# Patient Record
Sex: MALE | Race: WHITE | NOT HISPANIC OR LATINO | Employment: OTHER | URBAN - NONMETROPOLITAN AREA
[De-identification: names, ages, dates, MRNs, and addresses within clinical notes are randomized per-mention and may not be internally consistent; named-entity substitution may affect disease eponyms.]

---

## 2019-09-03 ENCOUNTER — OFFICE VISIT CONVERTED (OUTPATIENT)
Dept: FAMILY MEDICINE CLINIC | Age: 69
End: 2019-09-03
Attending: FAMILY MEDICINE

## 2019-09-04 ENCOUNTER — HOSPITAL ENCOUNTER (OUTPATIENT)
Dept: OTHER | Facility: HOSPITAL | Age: 69
Discharge: HOME | End: 2019-09-04
Attending: FAMILY MEDICINE

## 2019-09-04 LAB
ALBUMIN SERPL-MCNC: 4 G/DL (ref 3.5–5)
ALBUMIN/GLOB SERPL: 1.2 {RATIO} (ref 1.4–2.6)
ALP SERPL-CCNC: 85 U/L (ref 56–155)
ALT SERPL-CCNC: 11 U/L (ref 10–40)
ANION GAP SERPL CALC-SCNC: 20 MMOL/L (ref 8–19)
AST SERPL-CCNC: 14 U/L (ref 15–50)
BASOPHILS # BLD MANUAL: 0.09 10*3/UL (ref 0–0.2)
BASOPHILS NFR BLD MANUAL: 1 % (ref 0–3)
BILIRUB SERPL-MCNC: 0.35 MG/DL (ref 0.2–1.3)
BUN SERPL-MCNC: 20 MG/DL (ref 5–25)
BUN/CREAT SERPL: 17 {RATIO} (ref 6–20)
CALCIUM SERPL-MCNC: 9.1 MG/DL (ref 8.7–10.4)
CHLORIDE SERPL-SCNC: 98 MMOL/L (ref 99–111)
CHOLEST SERPL-MCNC: 196 MG/DL (ref 107–200)
CHOLEST/HDLC SERPL: 5.8 {RATIO} (ref 3–6)
CONV CO2: 27 MMOL/L (ref 22–32)
CONV CREATININE URINE, RANDOM: 119.3 MG/DL (ref 10–300)
CONV MICROALBUM.,U,RANDOM: <12 MG/L (ref 0–20)
CONV TOTAL PROTEIN: 7.4 G/DL (ref 6.3–8.2)
CREAT UR-MCNC: 1.21 MG/DL (ref 0.7–1.2)
DEPRECATED RDW RBC AUTO: 43.2 FL
EOSINOPHIL # BLD MANUAL: 0.31 10*3/UL (ref 0–0.7)
EOSINOPHIL NFR BLD MANUAL: 3.3 % (ref 0–7)
ERYTHROCYTE [DISTWIDTH] IN BLOOD BY AUTOMATED COUNT: 13.1 % (ref 11.5–14.5)
EST. AVERAGE GLUCOSE BLD GHB EST-MCNC: 186 MG/DL
GFR SERPLBLD BASED ON 1.73 SQ M-ARVRAT: >60 ML/MIN/{1.73_M2}
GLOBULIN UR ELPH-MCNC: 3.4 G/DL (ref 2–3.5)
GLUCOSE SERPL-MCNC: 163 MG/DL (ref 70–99)
GRANS (ABSOLUTE): 4.39 10*3/UL (ref 2–8)
GRANS: 47.1 % (ref 30–85)
HBA1C MFR BLD: 15.1 G/DL (ref 14–18)
HBA1C MFR BLD: 8.1 % (ref 3.5–5.7)
HCT VFR BLD AUTO: 45.4 % (ref 42–52)
HDLC SERPL-MCNC: 34 MG/DL (ref 40–60)
IMM GRANULOCYTES # BLD: 0.03 10*3/UL (ref 0–0.54)
IMM GRANULOCYTES NFR BLD: 0.3 % (ref 0–0.43)
INR PPP: 0.99 (ref 2–3)
LDLC SERPL CALC-MCNC: 115 MG/DL (ref 70–100)
LYMPHOCYTES # BLD MANUAL: 3.8 10*3/UL (ref 1–5)
LYMPHOCYTES NFR BLD MANUAL: 7.6 % (ref 3–10)
MCH RBC QN AUTO: 30 PG (ref 27–31)
MCHC RBC AUTO-ENTMCNC: 33.3 G/DL (ref 33–37)
MCV RBC AUTO: 90.3 FL (ref 80–96)
MICROALBUMIN/CREAT UR: 10.1 MG/G{CRE} (ref 0–25)
MONOCYTES # BLD AUTO: 0.71 10*3/UL (ref 0.2–1.2)
OSMOLALITY SERPL CALC.SUM OF ELEC: 298 MOSM/KG (ref 273–304)
PLATELET # BLD AUTO: 253 10*3/UL (ref 130–400)
PMV BLD AUTO: 11.5 FL (ref 7.4–10.4)
POTASSIUM SERPL-SCNC: 3.6 MMOL/L (ref 3.5–5.3)
PROTHROMBIN TIME: 10.3 S (ref 9.4–12)
RBC # BLD AUTO: 5.03 10*6/UL (ref 4.7–6.1)
SODIUM SERPL-SCNC: 141 MMOL/L (ref 135–147)
TRIGL SERPL-MCNC: 234 MG/DL (ref 40–150)
TSH SERPL-ACNC: 3.55 M[IU]/L (ref 0.27–4.2)
VARIANT LYMPHS NFR BLD MANUAL: 40.7 % (ref 20–45)
VLDLC SERPL-MCNC: 47 MG/DL (ref 5–37)
WBC # BLD AUTO: 9.33 10*3/UL (ref 4.8–10.8)

## 2019-09-05 LAB
ASO AB SERPL-ACNC: 1427 [IU]/ML (ref 0–200)
CONV ANTI NUCLEAR ANTIBODY WITH REFLEX: POSITIVE
CONV RHEUMATOID FACTOR IGM: 10.5 [IU]/ML (ref 0–14)
CRP SERPL-MCNC: 9.6 MG/L (ref 0–5)
ERYTHROCYTE [SEDIMENTATION RATE] IN BLOOD: 26 MM/H (ref 0–20)
PHOSPHATE SERPL-MCNC: 3.3 MG/DL (ref 2.4–4.5)
URATE SERPL-MCNC: 5.5 MG/DL (ref 3.5–8.5)

## 2019-09-06 LAB
CENTROMERE B AB SER-ACNC: <0.2 AI (ref 0–0.9)
CHROMATIN AB: 0.2 AI (ref 0–0.9)
CONV ANTI DOUBLE STRAND DNA  (REFLEX): <1 IU/ML (ref 0–9)
CONV SS-A ANTIBODY (REFLEX): <0.2 AI (ref 0–0.9)
CONV SS-B ANTIBODY (REFLEX): <0.2 AI (ref 0–0.9)
ENA JO1 AB SER IA-ACNC: <0.2 AI (ref 0–0.9)
ENA RNP AB SER-ACNC: 1 AI (ref 0–0.9)
ENA SCL70 AB SER QL IA: <0.2 AI (ref 0–0.9)
ENA SM AB SER-ACNC: <0.2 AI (ref 0–0.9)
Lab: ABNORMAL

## 2019-09-17 ENCOUNTER — OFFICE VISIT CONVERTED (OUTPATIENT)
Dept: FAMILY MEDICINE CLINIC | Age: 69
End: 2019-09-17
Attending: FAMILY MEDICINE

## 2019-10-14 ENCOUNTER — HOSPITAL ENCOUNTER (OUTPATIENT)
Dept: WOUND CARE | Facility: HOSPITAL | Age: 69
Setting detail: RECURRING SERIES
Discharge: STILL ADMITTED OR ADMINISTRATIVE | End: 2019-10-31
Attending: INTERNAL MEDICINE

## 2019-10-15 ENCOUNTER — OFFICE VISIT CONVERTED (OUTPATIENT)
Dept: FAMILY MEDICINE CLINIC | Age: 69
End: 2019-10-15
Attending: FAMILY MEDICINE

## 2019-10-21 ENCOUNTER — HOSPITAL ENCOUNTER (OUTPATIENT)
Dept: OTHER | Facility: HOSPITAL | Age: 69
Discharge: HOME | End: 2019-10-21
Attending: SURGERY

## 2019-10-21 LAB
CREAT BLD-MCNC: 1.2 MG/DL (ref 0.6–1.4)
GFR SERPLBLD BASED ON 1.73 SQ M-ARVRAT: >60 ML/MIN/{1.73_M2}

## 2019-10-28 ENCOUNTER — HOSPITAL ENCOUNTER (OUTPATIENT)
Dept: CARDIOLOGY | Facility: HOSPITAL | Age: 69
Discharge: HOME | End: 2019-10-28
Attending: SURGERY

## 2019-11-15 ENCOUNTER — HOSPITAL ENCOUNTER (OUTPATIENT)
Dept: OTHER | Facility: HOSPITAL | Age: 69
Discharge: HOME | End: 2019-11-15
Attending: PODIATRIST

## 2019-11-15 LAB
ALBUMIN SERPL-MCNC: 3.5 G/DL (ref 3.5–5)
ALBUMIN/GLOB SERPL: 1 {RATIO} (ref 1.4–2.6)
ALP SERPL-CCNC: 81 U/L (ref 56–155)
ALT SERPL-CCNC: 10 U/L (ref 10–40)
ANION GAP SERPL CALC-SCNC: 16 MMOL/L (ref 8–19)
AST SERPL-CCNC: 14 U/L (ref 15–50)
BASOPHILS # BLD MANUAL: 0.1 10*3/UL (ref 0–0.2)
BASOPHILS NFR BLD MANUAL: 1 % (ref 0–3)
BILIRUB SERPL-MCNC: 0.38 MG/DL (ref 0.2–1.3)
BUN SERPL-MCNC: 20 MG/DL (ref 5–25)
BUN/CREAT SERPL: 19 {RATIO} (ref 6–20)
CALCIUM SERPL-MCNC: 10 MG/DL (ref 8.7–10.4)
CHLORIDE SERPL-SCNC: 101 MMOL/L (ref 99–111)
CONV CO2: 27 MMOL/L (ref 22–32)
CONV TOTAL PROTEIN: 7 G/DL (ref 6.3–8.2)
CREAT UR-MCNC: 1.03 MG/DL (ref 0.7–1.2)
DEPRECATED RDW RBC AUTO: 44 FL
EOSINOPHIL # BLD MANUAL: 0.3 10*3/UL (ref 0–0.7)
EOSINOPHIL NFR BLD MANUAL: 2.9 % (ref 0–7)
ERYTHROCYTE [DISTWIDTH] IN BLOOD BY AUTOMATED COUNT: 13.4 % (ref 11.5–14.5)
GFR SERPLBLD BASED ON 1.73 SQ M-ARVRAT: >60 ML/MIN/{1.73_M2}
GLOBULIN UR ELPH-MCNC: 3.5 G/DL (ref 2–3.5)
GLUCOSE SERPL-MCNC: 140 MG/DL (ref 70–99)
GRANS (ABSOLUTE): 5.23 10*3/UL (ref 2–8)
GRANS: 51.1 % (ref 30–85)
HBA1C MFR BLD: 15.4 G/DL (ref 14–18)
HCT VFR BLD AUTO: 46.1 % (ref 42–52)
IMM GRANULOCYTES # BLD: 0.03 10*3/UL (ref 0–0.54)
IMM GRANULOCYTES NFR BLD: 0.3 % (ref 0–0.43)
LYMPHOCYTES # BLD MANUAL: 3.93 10*3/UL (ref 1–5)
LYMPHOCYTES NFR BLD MANUAL: 6.4 % (ref 3–10)
MCH RBC QN AUTO: 29.7 PG (ref 27–31)
MCHC RBC AUTO-ENTMCNC: 33.4 G/DL (ref 33–37)
MCV RBC AUTO: 89 FL (ref 80–96)
MONOCYTES # BLD AUTO: 0.66 10*3/UL (ref 0.2–1.2)
OSMOLALITY SERPL CALC.SUM OF ELEC: 295 MOSM/KG (ref 273–304)
PLATELET # BLD AUTO: 271 10*3/UL (ref 130–400)
PMV BLD AUTO: 11.1 FL (ref 7.4–10.4)
POTASSIUM SERPL-SCNC: 3.8 MMOL/L (ref 3.5–5.3)
RBC # BLD AUTO: 5.18 10*6/UL (ref 4.7–6.1)
SODIUM SERPL-SCNC: 140 MMOL/L (ref 135–147)
VARIANT LYMPHS NFR BLD MANUAL: 38.3 % (ref 20–45)
WBC # BLD AUTO: 10.25 10*3/UL (ref 4.8–10.8)

## 2019-11-19 ENCOUNTER — HOSPITAL ENCOUNTER (OUTPATIENT)
Dept: PREADMISSION TESTING | Facility: HOSPITAL | Age: 69
Discharge: HOME | End: 2019-11-19
Attending: PODIATRIST

## 2019-11-21 ENCOUNTER — OFFICE VISIT CONVERTED (OUTPATIENT)
Dept: FAMILY MEDICINE CLINIC | Age: 69
End: 2019-11-21
Attending: FAMILY MEDICINE

## 2019-11-26 ENCOUNTER — HOSPITAL ENCOUNTER (OUTPATIENT)
Dept: PERIOP | Facility: HOSPITAL | Age: 69
Setting detail: HOSPITAL OUTPATIENT SURGERY
Discharge: HOME | End: 2019-11-26
Attending: PODIATRIST

## 2019-11-26 LAB
GLUCOSE BLD-MCNC: 106 MG/DL (ref 70–99)
GLUCOSE BLD-MCNC: 97 MG/DL (ref 70–99)

## 2019-11-28 LAB
BACTERIA SPEC AEROBE CULT: NORMAL
BACTERIA SPEC ANAEROBE CULT: NORMAL

## 2019-12-09 ENCOUNTER — HOSPITAL ENCOUNTER (OUTPATIENT)
Dept: OTHER | Facility: HOSPITAL | Age: 69
Discharge: HOME | End: 2019-12-09
Attending: INTERNAL MEDICINE

## 2019-12-09 LAB
BASOPHILS # BLD MANUAL: 0.08 10*3/UL (ref 0–0.2)
BASOPHILS NFR BLD MANUAL: 0.8 % (ref 0–3)
DEPRECATED RDW RBC AUTO: 46.8 FL
EOSINOPHIL # BLD MANUAL: 0.36 10*3/UL (ref 0–0.7)
EOSINOPHIL NFR BLD MANUAL: 3.8 % (ref 0–7)
ERYTHROCYTE [DISTWIDTH] IN BLOOD BY AUTOMATED COUNT: 14 % (ref 11.5–14.5)
ERYTHROCYTE [SEDIMENTATION RATE] IN BLOOD: 14 MM/H (ref 0–20)
GRANS (ABSOLUTE): 4.58 10*3/UL (ref 2–8)
GRANS: 47.9 % (ref 30–85)
HBA1C MFR BLD: 14.4 G/DL (ref 14–18)
HCT VFR BLD AUTO: 44.1 % (ref 42–52)
IMM GRANULOCYTES # BLD: 0.05 10*3/UL (ref 0–0.54)
IMM GRANULOCYTES NFR BLD: 0.5 % (ref 0–0.43)
LYMPHOCYTES # BLD MANUAL: 3.85 10*3/UL (ref 1–5)
LYMPHOCYTES NFR BLD MANUAL: 6.7 % (ref 3–10)
MCH RBC QN AUTO: 29.6 PG (ref 27–31)
MCHC RBC AUTO-ENTMCNC: 32.7 G/DL (ref 33–37)
MCV RBC AUTO: 90.7 FL (ref 80–96)
MONOCYTES # BLD AUTO: 0.64 10*3/UL (ref 0.2–1.2)
PLATELET # BLD AUTO: 253 10*3/UL (ref 130–400)
PMV BLD AUTO: 10.8 FL (ref 7.4–10.4)
RBC # BLD AUTO: 4.86 10*6/UL (ref 4.7–6.1)
TSH SERPL-ACNC: 3.06 M[IU]/L (ref 0.27–4.2)
URATE SERPL-MCNC: 4.9 MG/DL (ref 3.5–8.5)
VARIANT LYMPHS NFR BLD MANUAL: 40.3 % (ref 20–45)
WBC # BLD AUTO: 9.56 10*3/UL (ref 4.8–10.8)

## 2019-12-10 LAB — CRP SERPL-MCNC: 8.5 MG/L (ref 0–5)

## 2019-12-11 ENCOUNTER — OFFICE VISIT CONVERTED (OUTPATIENT)
Dept: FAMILY MEDICINE CLINIC | Age: 69
End: 2019-12-11
Attending: FAMILY MEDICINE

## 2019-12-11 LAB
ALBUMIN SERPL-MCNC: 3.6 G/DL (ref 3.5–5)
ALBUMIN/GLOB SERPL: 1.1 {RATIO} (ref 1.4–2.6)
ALP SERPL-CCNC: 82 U/L (ref 56–155)
ALT SERPL-CCNC: 11 U/L (ref 10–40)
ANION GAP SERPL CALC-SCNC: 17 MMOL/L (ref 8–19)
AST SERPL-CCNC: 14 U/L (ref 15–50)
BILIRUB SERPL-MCNC: 0.18 MG/DL (ref 0.2–1.3)
BUN SERPL-MCNC: 19 MG/DL (ref 5–25)
BUN/CREAT SERPL: 17 {RATIO} (ref 6–20)
CALCIUM SERPL-MCNC: 9.7 MG/DL (ref 8.7–10.4)
CHLORIDE SERPL-SCNC: 99 MMOL/L (ref 99–111)
CHOLEST SERPL-MCNC: 214 MG/DL (ref 107–200)
CHOLEST/HDLC SERPL: 5 {RATIO} (ref 3–6)
CONV CO2: 28 MMOL/L (ref 22–32)
CONV CREATININE URINE, RANDOM: 120.7 MG/DL (ref 10–300)
CONV MICROALBUM.,U,RANDOM: 15.2 MG/L (ref 0–20)
CONV TOTAL PROTEIN: 6.8 G/DL (ref 6.3–8.2)
CREAT UR-MCNC: 1.1 MG/DL (ref 0.7–1.2)
CREAT UR-MCNC: 1.2 MG/DL (ref 0.7–1.2)
EST. AVERAGE GLUCOSE BLD GHB EST-MCNC: 157 MG/DL
GFR SERPLBLD BASED ON 1.73 SQ M-ARVRAT: >60 ML/MIN/{1.73_M2}
GLOBULIN UR ELPH-MCNC: 3.2 G/DL (ref 2–3.5)
GLUCOSE SERPL-MCNC: 119 MG/DL (ref 70–99)
HBA1C MFR BLD: 7.1 % (ref 3.5–5.7)
HDLC SERPL-MCNC: 43 MG/DL (ref 40–60)
LDLC SERPL CALC-MCNC: 138 MG/DL (ref 70–100)
MICROALBUMIN/CREAT UR: 12.6 MG/G{CRE} (ref 0–25)
OSMOLALITY SERPL CALC.SUM OF ELEC: 293 MOSM/KG (ref 273–304)
POTASSIUM SERPL-SCNC: 3.7 MMOL/L (ref 3.5–5.3)
SODIUM SERPL-SCNC: 140 MMOL/L (ref 135–147)
TRIGL SERPL-MCNC: 167 MG/DL (ref 40–150)
VLDLC SERPL-MCNC: 33 MG/DL (ref 5–37)

## 2019-12-12 LAB
C3 SERPL-MCNC: 126 MG/DL (ref 88–201)
C4 SERPL-MCNC: 32 MG/DL (ref 10–40)
CONV ANA IGG BY ELISA: NORMAL
RHEUMATOID FACT SERPL-ACNC: 10 IU/ML (ref 0–14)

## 2020-01-24 ENCOUNTER — OFFICE VISIT CONVERTED (OUTPATIENT)
Dept: FAMILY MEDICINE CLINIC | Age: 70
End: 2020-01-24
Attending: FAMILY MEDICINE

## 2020-01-27 ENCOUNTER — HOSPITAL ENCOUNTER (OUTPATIENT)
Dept: OTHER | Facility: HOSPITAL | Age: 70
Discharge: HOME | End: 2020-01-27
Attending: FAMILY MEDICINE

## 2020-02-10 ENCOUNTER — HOSPITAL ENCOUNTER (OUTPATIENT)
Dept: OTHER | Facility: HOSPITAL | Age: 70
Discharge: HOME | End: 2020-02-10
Attending: FAMILY MEDICINE

## 2020-02-10 LAB
ALBUMIN SERPL-MCNC: 3.7 G/DL (ref 3.5–5)
ALBUMIN/GLOB SERPL: 1.2 {RATIO} (ref 1.4–2.6)
ALP SERPL-CCNC: 74 U/L (ref 56–155)
ALT SERPL-CCNC: 14 U/L (ref 10–40)
ANION GAP SERPL CALC-SCNC: 21 MMOL/L (ref 8–19)
AST SERPL-CCNC: 16 U/L (ref 15–50)
BASOPHILS # BLD MANUAL: 0.07 10*3/UL (ref 0–0.2)
BASOPHILS NFR BLD MANUAL: 0.8 % (ref 0–3)
BILIRUB SERPL-MCNC: 0.24 MG/DL (ref 0.2–1.3)
BUN SERPL-MCNC: 16 MG/DL (ref 5–25)
BUN/CREAT SERPL: 14 {RATIO} (ref 6–20)
CALCIUM SERPL-MCNC: 9.4 MG/DL (ref 8.7–10.4)
CHLORIDE SERPL-SCNC: 98 MMOL/L (ref 99–111)
CHOLEST SERPL-MCNC: 199 MG/DL (ref 107–200)
CHOLEST/HDLC SERPL: 5.9 {RATIO} (ref 3–6)
CONV CO2: 27 MMOL/L (ref 22–32)
CONV CREATININE URINE, RANDOM: 148.6 MG/DL (ref 10–300)
CONV MICROALBUM.,U,RANDOM: 21.2 MG/L (ref 0–20)
CONV TOTAL PROTEIN: 6.8 G/DL (ref 6.3–8.2)
CREAT UR-MCNC: 1.17 MG/DL (ref 0.7–1.2)
DEPRECATED RDW RBC AUTO: 44.3 FL
EOSINOPHIL # BLD MANUAL: 0.32 10*3/UL (ref 0–0.7)
EOSINOPHIL NFR BLD MANUAL: 3.8 % (ref 0–7)
ERYTHROCYTE [DISTWIDTH] IN BLOOD BY AUTOMATED COUNT: 13.1 % (ref 11.5–14.5)
EST. AVERAGE GLUCOSE BLD GHB EST-MCNC: 166 MG/DL
GFR SERPLBLD BASED ON 1.73 SQ M-ARVRAT: >60 ML/MIN/{1.73_M2}
GLOBULIN UR ELPH-MCNC: 3.1 G/DL (ref 2–3.5)
GLUCOSE SERPL-MCNC: 152 MG/DL (ref 70–99)
GRANS (ABSOLUTE): 4.03 10*3/UL (ref 2–8)
GRANS: 47.7 % (ref 30–85)
HBA1C MFR BLD: 15 G/DL (ref 14–18)
HBA1C MFR BLD: 7.4 % (ref 3.5–5.7)
HCT VFR BLD AUTO: 45 % (ref 42–52)
HDLC SERPL-MCNC: 34 MG/DL (ref 40–60)
IMM GRANULOCYTES # BLD: 0.01 10*3/UL (ref 0–0.54)
IMM GRANULOCYTES NFR BLD: 0.1 % (ref 0–0.43)
LDLC SERPL CALC-MCNC: 118 MG/DL (ref 70–100)
LYMPHOCYTES # BLD MANUAL: 3.5 10*3/UL (ref 1–5)
LYMPHOCYTES NFR BLD MANUAL: 6.2 % (ref 3–10)
MCH RBC QN AUTO: 29.9 PG (ref 27–31)
MCHC RBC AUTO-ENTMCNC: 33.3 G/DL (ref 33–37)
MCV RBC AUTO: 89.6 FL (ref 80–96)
MICROALBUMIN/CREAT UR: 14.3 MG/G{CRE} (ref 0–25)
MONOCYTES # BLD AUTO: 0.52 10*3/UL (ref 0.2–1.2)
OSMOLALITY SERPL CALC.SUM OF ELEC: 298 MOSM/KG (ref 273–304)
PLATELET # BLD AUTO: 221 10*3/UL (ref 130–400)
PMV BLD AUTO: 11.1 FL (ref 7.4–10.4)
POTASSIUM SERPL-SCNC: 3.8 MMOL/L (ref 3.5–5.3)
RBC # BLD AUTO: 5.02 10*6/UL (ref 4.7–6.1)
SODIUM SERPL-SCNC: 142 MMOL/L (ref 135–147)
TRIGL SERPL-MCNC: 233 MG/DL (ref 40–150)
TSH SERPL-ACNC: 2.77 M[IU]/L (ref 0.27–4.2)
VARIANT LYMPHS NFR BLD MANUAL: 41.4 % (ref 20–45)
VLDLC SERPL-MCNC: 47 MG/DL (ref 5–37)
WBC # BLD AUTO: 8.45 10*3/UL (ref 4.8–10.8)

## 2020-02-13 ENCOUNTER — HOSPITAL ENCOUNTER (OUTPATIENT)
Dept: DIABETES SERVICES | Facility: HOSPITAL | Age: 70
Setting detail: RECURRING SERIES
Discharge: HOME | End: 2020-05-13
Attending: FAMILY MEDICINE

## 2020-02-17 ENCOUNTER — OFFICE VISIT CONVERTED (OUTPATIENT)
Dept: FAMILY MEDICINE CLINIC | Age: 70
End: 2020-02-17
Attending: FAMILY MEDICINE

## 2020-05-18 ENCOUNTER — OFFICE VISIT CONVERTED (OUTPATIENT)
Dept: FAMILY MEDICINE CLINIC | Age: 70
End: 2020-05-18
Attending: FAMILY MEDICINE

## 2020-06-02 ENCOUNTER — HOSPITAL ENCOUNTER (OUTPATIENT)
Dept: OTHER | Facility: HOSPITAL | Age: 70
Discharge: HOME | End: 2020-06-02
Attending: FAMILY MEDICINE

## 2020-06-02 LAB
BASOPHILS # BLD MANUAL: 0.08 10*3/UL (ref 0–0.2)
BASOPHILS NFR BLD MANUAL: 0.9 % (ref 0–3)
DEPRECATED RDW RBC AUTO: 44.8 FL
EOSINOPHIL # BLD MANUAL: 0.61 10*3/UL (ref 0–0.7)
EOSINOPHIL NFR BLD MANUAL: 6.6 % (ref 0–7)
ERYTHROCYTE [DISTWIDTH] IN BLOOD BY AUTOMATED COUNT: 13.4 % (ref 11.5–14.5)
GRANS (ABSOLUTE): 4.72 10*3/UL (ref 2–8)
GRANS: 50.6 % (ref 30–85)
HBA1C MFR BLD: 14.2 G/DL (ref 14–18)
HCT VFR BLD AUTO: 43.3 % (ref 42–52)
IMM GRANULOCYTES # BLD: 0.03 10*3/UL (ref 0–0.54)
IMM GRANULOCYTES NFR BLD: 0.3 % (ref 0–0.43)
LYMPHOCYTES # BLD MANUAL: 3.2 10*3/UL (ref 1–5)
LYMPHOCYTES NFR BLD MANUAL: 7.2 % (ref 3–10)
MCH RBC QN AUTO: 29.7 PG (ref 27–31)
MCHC RBC AUTO-ENTMCNC: 32.8 G/DL (ref 33–37)
MCV RBC AUTO: 90.6 FL (ref 80–96)
MONOCYTES # BLD AUTO: 0.67 10*3/UL (ref 0.2–1.2)
PLATELET # BLD AUTO: 235 10*3/UL (ref 130–400)
PMV BLD AUTO: 10.9 FL (ref 7.4–10.4)
PSA SERPL-MCNC: 3.93 NG/ML (ref 0–4)
RBC # BLD AUTO: 4.78 10*6/UL (ref 4.7–6.1)
TSH SERPL-ACNC: 1.84 M[IU]/L (ref 0.27–4.2)
VARIANT LYMPHS NFR BLD MANUAL: 34.4 % (ref 20–45)
WBC # BLD AUTO: 9.31 10*3/UL (ref 4.8–10.8)

## 2020-06-03 LAB
ALBUMIN SERPL-MCNC: 3.6 G/DL (ref 3.5–5)
ALBUMIN/GLOB SERPL: 1.3 {RATIO} (ref 1.4–2.6)
ALP SERPL-CCNC: 82 U/L (ref 56–155)
ALT SERPL-CCNC: 13 U/L (ref 10–40)
ANION GAP SERPL CALC-SCNC: 21 MMOL/L (ref 8–19)
AST SERPL-CCNC: 15 U/L (ref 15–50)
BILIRUB SERPL-MCNC: 0.33 MG/DL (ref 0.2–1.3)
BUN SERPL-MCNC: 15 MG/DL (ref 5–25)
BUN/CREAT SERPL: 15 {RATIO} (ref 6–20)
CALCIUM SERPL-MCNC: 9.4 MG/DL (ref 8.7–10.4)
CHLORIDE SERPL-SCNC: 100 MMOL/L (ref 99–111)
CHOLEST SERPL-MCNC: 115 MG/DL (ref 107–200)
CHOLEST/HDLC SERPL: 2.9 {RATIO} (ref 3–6)
CONV CO2: 23 MMOL/L (ref 22–32)
CONV CREATININE URINE, RANDOM: 114.6 MG/DL (ref 10–300)
CONV MICROALBUM.,U,RANDOM: 21 MG/L (ref 0–20)
CONV TOTAL PROTEIN: 6.4 G/DL (ref 6.3–8.2)
CREAT UR-MCNC: 1.02 MG/DL (ref 0.7–1.2)
EST. AVERAGE GLUCOSE BLD GHB EST-MCNC: 169 MG/DL
GFR SERPLBLD BASED ON 1.73 SQ M-ARVRAT: >60 ML/MIN/{1.73_M2}
GLOBULIN UR ELPH-MCNC: 2.8 G/DL (ref 2–3.5)
GLUCOSE SERPL-MCNC: 128 MG/DL (ref 70–99)
HBA1C MFR BLD: 7.5 % (ref 3.5–5.7)
HDLC SERPL-MCNC: 39 MG/DL (ref 40–60)
LDLC SERPL CALC-MCNC: 55 MG/DL (ref 70–100)
MICROALBUMIN/CREAT UR: 18.3 MG/G{CRE} (ref 0–25)
OSMOLALITY SERPL CALC.SUM OF ELEC: 292 MOSM/KG (ref 273–304)
POTASSIUM SERPL-SCNC: 3.6 MMOL/L (ref 3.5–5.3)
SODIUM SERPL-SCNC: 140 MMOL/L (ref 135–147)
TRIGL SERPL-MCNC: 103 MG/DL (ref 40–150)
VLDLC SERPL-MCNC: 21 MG/DL (ref 5–37)

## 2020-07-31 ENCOUNTER — HOSPITAL ENCOUNTER (OUTPATIENT)
Dept: PHYSICAL THERAPY | Facility: CLINIC | Age: 70
Setting detail: RECURRING SERIES
Discharge: HOME | End: 2020-09-03

## 2020-09-02 ENCOUNTER — OFFICE VISIT CONVERTED (OUTPATIENT)
Dept: FAMILY MEDICINE CLINIC | Age: 70
End: 2020-09-02
Attending: FAMILY MEDICINE

## 2020-09-02 ENCOUNTER — HOSPITAL ENCOUNTER (OUTPATIENT)
Dept: OTHER | Facility: HOSPITAL | Age: 70
Discharge: HOME | End: 2020-09-02
Attending: FAMILY MEDICINE

## 2020-09-02 LAB
BASOPHILS # BLD MANUAL: 0.12 10*3/UL (ref 0–0.2)
BASOPHILS NFR BLD MANUAL: 1 % (ref 0–3)
DEPRECATED RDW RBC AUTO: 43.3 FL
EOSINOPHIL # BLD MANUAL: 0.62 10*3/UL (ref 0–0.7)
EOSINOPHIL NFR BLD MANUAL: 5.4 % (ref 0–7)
ERYTHROCYTE [DISTWIDTH] IN BLOOD BY AUTOMATED COUNT: 13.3 % (ref 11.5–14.5)
GRANS (ABSOLUTE): 5.9 10*3/UL (ref 2–8)
GRANS: 51.4 % (ref 30–85)
HBA1C MFR BLD: 13.1 G/DL (ref 14–18)
HCT VFR BLD AUTO: 41.5 % (ref 42–52)
IMM GRANULOCYTES # BLD: 0.03 10*3/UL (ref 0–0.54)
IMM GRANULOCYTES NFR BLD: 0.3 % (ref 0–0.43)
LYMPHOCYTES # BLD MANUAL: 3.98 10*3/UL (ref 1–5)
LYMPHOCYTES NFR BLD MANUAL: 7.3 % (ref 3–10)
MCH RBC QN AUTO: 27.6 PG (ref 27–31)
MCHC RBC AUTO-ENTMCNC: 31.6 G/DL (ref 33–37)
MCV RBC AUTO: 87.6 FL (ref 80–96)
MONOCYTES # BLD AUTO: 0.84 10*3/UL (ref 0.2–1.2)
PLATELET # BLD AUTO: 315 10*3/UL (ref 130–400)
PMV BLD AUTO: 10.8 FL (ref 7.4–10.4)
RBC # BLD AUTO: 4.74 10*6/UL (ref 4.7–6.1)
VARIANT LYMPHS NFR BLD MANUAL: 34.6 % (ref 20–45)
WBC # BLD AUTO: 11.49 10*3/UL (ref 4.8–10.8)

## 2020-09-03 LAB
ALBUMIN SERPL-MCNC: 3.7 G/DL (ref 3.5–5)
ALBUMIN/GLOB SERPL: 1.2 {RATIO} (ref 1.4–2.6)
ALP SERPL-CCNC: 115 U/L (ref 56–155)
ALT SERPL-CCNC: 15 U/L (ref 10–40)
ANION GAP SERPL CALC-SCNC: 18 MMOL/L (ref 8–19)
AST SERPL-CCNC: 17 U/L (ref 15–50)
BILIRUB SERPL-MCNC: 0.24 MG/DL (ref 0.2–1.3)
BUN SERPL-MCNC: 16 MG/DL (ref 5–25)
BUN/CREAT SERPL: 13 {RATIO} (ref 6–20)
CALCIUM SERPL-MCNC: 9.7 MG/DL (ref 8.7–10.4)
CHLORIDE SERPL-SCNC: 101 MMOL/L (ref 99–111)
CONV CO2: 27 MMOL/L (ref 22–32)
CONV TOTAL PROTEIN: 6.9 G/DL (ref 6.3–8.2)
CREAT UR-MCNC: 1.19 MG/DL (ref 0.7–1.2)
EST. AVERAGE GLUCOSE BLD GHB EST-MCNC: 151 MG/DL
GFR SERPLBLD BASED ON 1.73 SQ M-ARVRAT: >60 ML/MIN/{1.73_M2}
GLOBULIN UR ELPH-MCNC: 3.2 G/DL (ref 2–3.5)
GLUCOSE SERPL-MCNC: 136 MG/DL (ref 70–99)
HBA1C MFR BLD: 6.9 % (ref 3.5–5.7)
OSMOLALITY SERPL CALC.SUM OF ELEC: 297 MOSM/KG (ref 273–304)
POTASSIUM SERPL-SCNC: 4 MMOL/L (ref 3.5–5.3)
SODIUM SERPL-SCNC: 142 MMOL/L (ref 135–147)
TSH SERPL-ACNC: 1.92 M[IU]/L (ref 0.27–4.2)

## 2020-09-30 ENCOUNTER — HOSPITAL ENCOUNTER (OUTPATIENT)
Dept: OTHER | Facility: HOSPITAL | Age: 70
Discharge: HOME | End: 2020-09-30
Attending: FAMILY MEDICINE

## 2020-09-30 ENCOUNTER — CONVERSION ENCOUNTER (OUTPATIENT)
Dept: CARDIOLOGY | Facility: CLINIC | Age: 70
End: 2020-09-30
Attending: INTERNAL MEDICINE

## 2020-10-01 ENCOUNTER — OFFICE VISIT CONVERTED (OUTPATIENT)
Dept: PODIATRY | Facility: CLINIC | Age: 70
End: 2020-10-01
Attending: PODIATRIST

## 2020-10-14 ENCOUNTER — OFFICE VISIT CONVERTED (OUTPATIENT)
Dept: FAMILY MEDICINE CLINIC | Age: 70
End: 2020-10-14
Attending: FAMILY MEDICINE

## 2020-10-16 ENCOUNTER — HOSPITAL ENCOUNTER (OUTPATIENT)
Dept: SLEEP MEDICINE | Facility: HOSPITAL | Age: 70
Discharge: HOME | End: 2020-10-16
Attending: INTERNAL MEDICINE

## 2020-11-05 ENCOUNTER — OFFICE VISIT CONVERTED (OUTPATIENT)
Dept: PODIATRY | Facility: CLINIC | Age: 70
End: 2020-11-05
Attending: PODIATRIST

## 2020-11-05 ENCOUNTER — CONVERSION ENCOUNTER (OUTPATIENT)
Dept: PODIATRY | Facility: CLINIC | Age: 70
End: 2020-11-05

## 2020-12-23 ENCOUNTER — OFFICE VISIT CONVERTED (OUTPATIENT)
Dept: FAMILY MEDICINE CLINIC | Age: 70
End: 2020-12-23
Attending: FAMILY MEDICINE

## 2020-12-23 ENCOUNTER — HOSPITAL ENCOUNTER (OUTPATIENT)
Dept: OTHER | Facility: HOSPITAL | Age: 70
Discharge: HOME | End: 2020-12-23
Attending: FAMILY MEDICINE

## 2020-12-23 LAB
EST. AVERAGE GLUCOSE BLD GHB EST-MCNC: 169 MG/DL
HBA1C MFR BLD: 7.5 % (ref 3.5–5.7)

## 2020-12-25 LAB
ALP SERPL-CCNC: 110 U/L (ref 56–155)
ASO AB SERPL-ACNC: 235 [IU]/ML (ref 0–200)
CALCIUM SERPL-MCNC: 9 MG/DL (ref 8.7–10.4)
CONV RHEUMATOID FACTOR IGM: <10 [IU]/ML (ref 0–14)
CRP SERPL-MCNC: 5.8 MG/L (ref 0–5)
DSDNA AB SER-ACNC: NEGATIVE [IU]/ML
ENA AB SER IA-ACNC: NEGATIVE {RATIO}
ERYTHROCYTE [SEDIMENTATION RATE] IN BLOOD: 13 MM/H (ref 0–20)
PHOSPHATE SERPL-MCNC: 3.4 MG/DL (ref 2.4–4.5)
URATE SERPL-MCNC: 4.4 MG/DL (ref 3.5–8.5)

## 2021-01-07 ENCOUNTER — PROCEDURE VISIT CONVERTED (OUTPATIENT)
Dept: PODIATRY | Facility: CLINIC | Age: 71
End: 2021-01-07
Attending: PODIATRIST

## 2021-02-02 ENCOUNTER — OFFICE VISIT CONVERTED (OUTPATIENT)
Dept: FAMILY MEDICINE CLINIC | Age: 71
End: 2021-02-02
Attending: FAMILY MEDICINE

## 2021-02-24 ENCOUNTER — OFFICE VISIT CONVERTED (OUTPATIENT)
Dept: FAMILY MEDICINE CLINIC | Age: 71
End: 2021-02-24
Attending: FAMILY MEDICINE

## 2021-02-24 ENCOUNTER — HOSPITAL ENCOUNTER (OUTPATIENT)
Dept: OTHER | Facility: HOSPITAL | Age: 71
Discharge: HOME | End: 2021-02-24
Attending: FAMILY MEDICINE

## 2021-02-24 LAB
ALBUMIN SERPL-MCNC: 3.5 G/DL (ref 3.5–5)
ALBUMIN/GLOB SERPL: 1.2 {RATIO} (ref 1.4–2.6)
ALP SERPL-CCNC: 105 U/L (ref 56–155)
ALT SERPL-CCNC: 15 U/L (ref 10–40)
ANION GAP SERPL CALC-SCNC: 14 MMOL/L (ref 8–19)
AST SERPL-CCNC: 16 U/L (ref 15–50)
BASOPHILS # BLD AUTO: 0.1 10*3/UL (ref 0–0.2)
BASOPHILS NFR BLD AUTO: 0.9 % (ref 0–3)
BILIRUB SERPL-MCNC: 0.19 MG/DL (ref 0.2–1.3)
BUN SERPL-MCNC: 20 MG/DL (ref 5–25)
BUN/CREAT SERPL: 18 {RATIO} (ref 6–20)
CALCIUM SERPL-MCNC: 9 MG/DL (ref 8.7–10.4)
CHLORIDE SERPL-SCNC: 103 MMOL/L (ref 99–111)
CONV ABS IMM GRAN: 0.04 10*3/UL (ref 0–0.2)
CONV CO2: 27 MMOL/L (ref 22–32)
CONV IMMATURE GRAN: 0.3 % (ref 0–1.8)
CONV TOTAL PROTEIN: 6.4 G/DL (ref 6.3–8.2)
CREAT UR-MCNC: 1.12 MG/DL (ref 0.7–1.2)
DEPRECATED RDW RBC AUTO: 46.7 FL (ref 35.1–43.9)
EOSINOPHIL # BLD AUTO: 0.45 10*3/UL (ref 0–0.7)
EOSINOPHIL # BLD AUTO: 3.9 % (ref 0–7)
ERYTHROCYTE [DISTWIDTH] IN BLOOD BY AUTOMATED COUNT: 15.1 % (ref 11.6–14.4)
GFR SERPLBLD BASED ON 1.73 SQ M-ARVRAT: >60 ML/MIN/{1.73_M2}
GLOBULIN UR ELPH-MCNC: 2.9 G/DL (ref 2–3.5)
GLUCOSE SERPL-MCNC: 191 MG/DL (ref 70–99)
HCT VFR BLD AUTO: 42.6 % (ref 42–52)
HGB BLD-MCNC: 13.7 G/DL (ref 14–18)
LYMPHOCYTES # BLD AUTO: 3.71 10*3/UL (ref 1–5)
LYMPHOCYTES NFR BLD AUTO: 32.2 % (ref 20–45)
MCH RBC QN AUTO: 27.5 PG (ref 27–31)
MCHC RBC AUTO-ENTMCNC: 32.2 G/DL (ref 33–37)
MCV RBC AUTO: 85.5 FL (ref 80–96)
MONOCYTES # BLD AUTO: 0.89 10*3/UL (ref 0.2–1.2)
MONOCYTES NFR BLD AUTO: 7.7 % (ref 3–10)
NEUTROPHILS # BLD AUTO: 6.33 10*3/UL (ref 2–8)
NEUTROPHILS NFR BLD AUTO: 55 % (ref 30–85)
NRBC CBCN: 0 % (ref 0–0.7)
OSMOLALITY SERPL CALC.SUM OF ELEC: 298 MOSM/KG (ref 273–304)
PLATELET # BLD AUTO: 265 10*3/UL (ref 130–400)
PMV BLD AUTO: 11.6 FL (ref 9.4–12.4)
POTASSIUM SERPL-SCNC: 4 MMOL/L (ref 3.5–5.3)
RBC # BLD AUTO: 4.98 10*6/UL (ref 4.7–6.1)
SODIUM SERPL-SCNC: 140 MMOL/L (ref 135–147)
TSH SERPL-ACNC: 1.66 M[IU]/L (ref 0.27–4.2)
WBC # BLD AUTO: 11.52 10*3/UL (ref 4.8–10.8)

## 2021-04-01 ENCOUNTER — CONVERSION ENCOUNTER (OUTPATIENT)
Dept: PODIATRY | Facility: CLINIC | Age: 71
End: 2021-04-01

## 2021-04-01 ENCOUNTER — PROCEDURE VISIT CONVERTED (OUTPATIENT)
Dept: PODIATRY | Facility: CLINIC | Age: 71
End: 2021-04-01
Attending: PODIATRIST

## 2021-04-30 ENCOUNTER — OFFICE VISIT CONVERTED (OUTPATIENT)
Dept: FAMILY MEDICINE CLINIC | Age: 71
End: 2021-04-30
Attending: FAMILY MEDICINE

## 2021-05-10 NOTE — H&P
"   History and Physical      Patient Name: Jonas Mercado   Patient ID: [id number]   Sex: Male   YOB: 1950    Primary Care Provider: Yassine Corley MD   Referring Provider: Yassine Corley MD    Visit Date: October 1, 2020    Provider: Toby Gonzalez DPM   Location: [facility]   Location Address: [address] [place] [address]  [address] [place] [address]  [id number]   Location Phone: [phone]          Chief Complaint  · Bilateral Foot Pain      History Of Present Illness  Jonas Mercado is a 69 year old /White male who presents to the [facility] today new patient referred from Yassine Corley MD.      > R  Duration:  6 months  Onset:  gradual  Nature:  achy, sharp, shooting  Stable, worsening, improving:  worsening  Aggravating factors:    Patient describes morning bilateral heel pain as stabbing, burning, or aching. This pain usually subsides throughout the day, however it returns afterperiods of rest andsitting, when standing back up on their feet,and again the next morning.    Previous Treatment:  cortisone injections, DM shoes with custom-made orthotics (not wearing them today), night splint.    Patient denies any fevers, chills, nausea, vomiting, shortness of breathe, nor any other constitutional signs nor symptoms.      Upon giving the patient the option of a cortisone shot or topical anti-inflammatories for his left foot he states he would like to try the topical medication.  He states his right foot does not need any treatment at this time.    Patient states that Achilles tendon repair in distant past on the right side when he lived in [place].\">New, Established, New Problem:  new  Location:  Bilateral heel; Left >> R  Duration:  6 months  Onset:  gradual  Nature:  achy, sharp, shooting  Stable, worsening, improving:  worsening  Aggravating factors:    Patient describes morning bilateral heel pain as stabbing, burning, or aching. This pain usually " subsides throughout the day, however it returns afterperiods of rest andsitting, when standing back up on their feet,and again the next morning.    Previous Treatment:  cortisone injections, DM shoes with custom-made orthotics (not wearing them today), night splint.    Patient denies any fevers, chills, nausea, vomiting, shortness of breathe, nor any other constitutional signs nor symptoms.      Upon giving the patient the option of a cortisone shot or topical anti-inflammatories for his left foot he states he would like to try the topical medication.  He states his right foot does not need any treatment at this time.    Patient states that Achilles tendon repair in distant past on the right side when he lived in [place].      New, Established, New Problem: 2nd problem   Location: bilateral feet  Duration:   Onset: gradual  Nature: NIDDM  Stable, worsening, improving: stable  Aggravating factors:  Previous Treatment: oral medication    Pt states their most recent blood glucose reading was 138.       Past Medical History  Achilles tendon rupture; Ankle pain, left; Ankle pain, right; Arthritis; Corns and callus; Diabetes type 2, controlled; Foot pain, left; Hammertoe; Heel pain; Hyperlipemia; Hypertension; Numbness in feet; Pulmonary embolism         Past Surgical History  Back surgery; Carpal tunnel release; Foot surgery; Knee arthroscopy, diagnostic         Medication List  allopurinol 300 mg oral tablet; amlodipine 5 mg oral tablet; lisinopril-hydrochlorothiazide 20-12.5 mg oral tablet; metformin 1,000 mg oral tablet; metoprolol succinate 200 mg oral tablet extended release 24 hr; pregabalin 75 mg oral capsule; Tylenol-Codeine #3 300-30 mg oral tablet         Allergy List  NO KNOWN DRUG ALLERGIES       Allergies Reconciled  Family Medical History  Family history of stroke; Family history of heart disease         Social History  Alcohol (Never); Tobacco (Former)         Review of  Systems  · Constitutional  o Denies  o : fatigue, night sweats  · Eyes  o Denies  o : double vision, blurred vision  · HENT  o Denies  o : vertigo, recent head injury  · Cardiovascular  o Denies  o : chest pain, irregular heart beats  · Respiratory  o Denies  o : shortness of breath, productive cough  · Gastrointestinal  o Denies  o : nausea, vomiting  · Genitourinary  o Denies  o : dysuria, urinary retention  · Integument  o Denies  o : hair growth change, new skin lesions  · Neurologic  o Admits  o : tingling or numbness  o Denies  o : altered mental status, seizures  · Musculoskeletal  o * See HPI  · Endocrine  o Denies  o : cold intolerance, heat intolerance  · Heme-Lymph  o Denies  o : petechiae, lymph node enlargement or tenderness  · Allergic-Immunologic  o Denies  o : frequent illnesses      Vitals  Date Time BP Position Site L\R Cuff Size HR RR TEMP (F) WT  HT  BMI kg/m2 BSA m2 O2 Sat FR L/min FiO2 HC       10/01/2020 12:46 /75 Sitting    71 - R  96.7 300lbs 0oz 6'   40.69 2.63 97 %      10/01/2020 12:46 /65 Sitting                       Physical Examination  · Constitutional  o Appearance  o : overweight, well developed  · Cardiovascular  o Peripheral Vascular System  o :   § Pedal Pulses  § : Pedal Pulses are +2 and symmetrical   § Extremities  § : 2+ edema present, no cyanosis, no distal hair loss, normal capillary refill  · Skin and Subcutaneous Tissue  o General Inspection  o : Skin is noted to have normal texture and turgor, with no excresences noted.  o Digits and Nails  o : The tonails are noted to be without disease.  · Neurologic  o Sensation  o : Sharp/dull sensation is diminished bilaterally. Monofilament sensation examination of the left foot is diminished. Monofilament sensation examination of the right foot is diminished.  · Left Ankle/Foot  o Inspection  o : Positive tenderness to palpaiton to the medial tubercle of the calcaneus. No signs of edema, erythema, lymphangitis, nor  signs of infection.     Right foot shows no tenderness to palpation along the plantar fascial band.  Posterior aspect of the right foot shows a well-healed scar consistent with the patient's reported history of a Achilles tendon rupture repair.      IMAGING:  3 view, AP, MO, Lateral, 3 views of bilateral foot and ankle.  No periosteal reactions nor osteolytic changes seen.  No occult fractures seen.  Degenerative changes reported in all views.           Assessment  · Foot pain, bilateral       Pain in right foot     729.5/M79.671  Pain in left foot     729.5/M79.672  · Plantar fascial fibromatosis of left foot     728.71/M72.2  · Primary osteoarthritis of both ankles       Primary osteoarthritis, right ankle and foot     715.17/M19.071  Primary osteoarthritis, left ankle and foot     715.17/M19.072  · Diabetes     250.00/E11.9  · Neuropathy     355.9/G62.9      Plan  · Orders  o Diabetic Foot (Motor and Sensory) Exam Completed [facility] (, , 2028F) - - 10/01/2020  o BMI above 25 and plan documented () - - 10/01/2020  · Medications  o Voltaren 1 % topical gel   SIG: apply to affected area(s) by topical route 4 times a day for 30 days   DISP: (5) Tube with 3 refills  Prescribed on 10/01/2020     o Medications have been Reconciled  o Transition of Care or Provider Policy  · Instructions  o Handouts provided regarding Planter Fascitis.  o Overview: This patient has a plantar fasciitis.  o Discuss Findings: I have discussed the findings of this evaluation with the patient. The discussion included a complete verbal explanation of any changes in the examination results, diagnosis, and the current treatment plan. A schedule for future care needs was explained. If any questions should arise after returning home, I have encouraged the patient to feel free to contact Dr. Gonzalez. The patient states understanding and agreement with this plan.  o Monitor For Problems: Pt to monitor for problems and to contact   Carlos for follow-up should such signs occur. Patient states understanding and agreement with this plan.  o Treatement Alternatives: Nonsurgical treatments almost always improve the pain. Treatment can last from several months to 2 years before symptoms get better. Most patients feel better in 9 months. Rarely Some people need surgery to relieve the pain.  o Conservative Treatment Recommendations: Anti-inflammatory medication to reduce pain and inflammation, Heel stretching exercises,   o Discuss Findings: I have discussed the findings of this evaluation with the patient. The discussion included a complete verbal explanation of any changes in the examination results, diagnosis, and the current treatment plan. A schedule for future care needs was explained. If any questions should arise after returning home, I have encouraged the patient to feel free to contact Dr. Gonzalez. The patient states understanding and agreement with this plan.  o RTC one month  o Diabetic foot exam performed and documented this date, compliant with CQM required standards. Detail of findings as noted in physical exam.Lower extremity Neuro exam for diabetic patient performed and documented this date, compliant with PQRS required standards. Detail of findings as noted in physical exam.Advised patient importance of good routine lower extremity hygiene. Advised patient importance of evaluating for intact skin and pain free nail borders. Advised patient to use mirror to evaluate plantar/ soles of feet for better visualization. Advised patient monitor and phone office to be seen if any cracking to skin, open lesions, painful nail borders or if nails become elongated prior to next visit. Advised patient importance of daily cleansing of lower extremities, followed by good skin cream to maintain normal hydration of skin. Also advised patient importance of close daily monitoring of blood sugar. Advised to regulate diet and medications to maintain  control of blood sugar in optimal range. Contact primary care provider if difficulties maintaining blood sugar levels.Advised Patient of presence of Diabetes Mellitus condition. Advised Patient risk of progression and worsening or improvement, then return of condition. Will monitor condition for any change in future. Treat with most appropriate treatment pending status of condition.Counseled and advised patient extensively on nature and ramifications of diabetes. Standard instructions given to patient for good diabetic foot care and maintenance. Advised importance of careful monitoring to avoid break down and complications secondary to diabetes. Advised patient importance of strict maintenance of blood sugar control. Advised patient of possible ominous results from neglect of condition,i.e.: amputation/ loss of digits, feet and legs, or even death.Patient states understands counseling, will monitor closely, continue good hygiene and routine diabetic foot care. Patient will contact office is questions or problems. Diabetic foot exam performed and documented this date, compliant with CQM required standards. Detail of findings as noted in physical exam.Lower extremity Neuro exam for diabetic patient performed and documented this date, compliant with PQRS required standards. Detail of findings as noted in physical exam.Advised patient importance of good routine lower extremity hygiene. Advised patient importance of evaluating for intact skin and pain free nail borders. Advised patient to use mirror to evaluate plantar/ soles of feet for better visualization. Advised patient monitor and phone office to be seen if any cracking to skin, open lesions, painful nail borders or if nails become elongated prior to next visit. Advised patient importance of daily cleansing of lower extremities, followed by good skin cream to maintain normal hydration of skin. Also advised patient importance of close daily monitoring of blood  sugar. Advised to regulate diet and medications to maintain control of blood sugar in optimal range. Contact primary care provider if difficulties maintaining blood sugar levels.Advised Patient of presence of Diabetes Mellitus condition. Advised Patient risk of progression and worsening or improvement, then return of condition. Will monitor condition for any change in future. Treat with most appropriate treatment pending status of condition.Counseled and advised patient extensively on nature and ramifications of diabetes. Standard instructions given to patient for good diabetic foot care and maintenance. Advised importance of careful monitoring to avoid break down and complications secondary to diabetes. Advised patient importance of strict maintenance of blood sugar control. Advised patient of possible ominous results from neglect of condition,i.e.: amputation/ loss of digits, feet and legs, or even death.Patient states understands counseling, will monitor closely, continue good hygiene and routine diabetic foot care. Patient will contact office is questions or problems. Patient is to return in one year for their Podiatric Diabetic Evaluation.   o Electronically Identified Patient Education Materials Provided Electronically  · Disposition  o Call or Return if symptoms worsen or persist.            Electronically Signed by: Toby Gonzalez DPM -Author on October 1, 2020 01:12:32 PM

## 2021-05-10 NOTE — PROCEDURES
"   Procedure Note      Patient Name: Jonas Mercado   Patient ID: [id number]   Sex: Male   YOB: 1950    Primary Care Provider: Yassine Corley MD   Referring Provider: Yassine Corley MD    Visit Date: September 30, 2020    Provider: Jean Ortega MD   Location: [facility] [place]   Location Address: [address] [place] [address] [facility]  [address]  [place], [place]  [id number]   Location Phone: [phone]          FINAL REPORT   TRANSTHORACIC ECHOCARDIOGRAM REPORT    Diagnosis: Shortness of breath   Height: 5'10\" Weight: 288 B/P: BLOOD PRESSURE BSA: 2.44   Tech: JLW   MEASUREMENTS:  RVID (Diastole) : RVID. (NORMAL: 0.7 to 2.4 cm max)   LVID (Systole): 3.5 cm (Diastole): 5.2 cm . (NORMAL: 3.7 - 5.4 cm)   Posterior Wall Thickness (Diastole): 1.1 cm. (NORMAL: 0.8 - 1.1 cm)   Septal Thickness (Diastole): 0.9 cm. (NORMAL: 0.7 - 1.2 cm)   LAID (Systole): 3.3 cm. (NORMAL: 1.9 - 3.8 cm)   Aortic Root Diameter (Diastole): 3.6 cm. (NORMAL: 2.0 - 3.7 cm)   COMMENTS:  The patient underwent 2-D, M-Mode, and Doppler examination, including pulse-wave, continuous-wave, and color-flow analysis; the study is technically adequate.   FINDINGS:  MITRAL VALVE: The mitral leaflets appeared mildly thickened, but open well. No evidence of mitral valve prolapse mitral stenosis. Trace mitral regurgitation.   AORTIC VALVE: Trileaflet aortic valve, which opens well. No evidence of aortic stenosis or aortic regurgitation.   TRICUSPID VALVE: Normal valve morphology and leaflet excursion. Trace tricuspid regurgitation. Estimated right ventricular systolic pressure was 25 mmHg.   PULMONIC VALVE: Grossly normal. Mild pulmonic regurgitation per Doppler imaging.   AORTIC ROOT: Normal in size.   LEFT ATRIUM: The left atrium is at the upper limits of normal size. LA volume index is 28 mL/m2. No intracavitary thrombus or mass visualized.   LEFT VENTRICLE: Normal left ventricular size. Normal left ventricular wall thickness. No left ventricular " thrombus or mass visualized. Normal left ventricular systolic function with an estimated ejection fraction of 60%. No regional wall motion abnormalities were visualized. Impaired relaxation filling pattern (grade 1 diastolic dysfunction). Tissue Doppler findings were consistent with indeterminate left ventricular filling pressure.   RIGHT VENTRICLE: Normal right ventricular size and systolic function.   RIGHT ATRIUM: Normal right atrial size.   PERICARDIUM: No pericardial effusion.   INFERIOR VENA CAVA: Normal IVC size and respirophasic changes.   DOPPLER: E/A ratio is 0.6. DT= 260 msec. IVRT is 112 msec. E/e' is 11.   Faxed: 10/05/2020      CONCLUSIONS:  Normal left ventricular systolic function with an estimated ejection fraction of 60%.  No regional wall motion abnormalities were visualized. Grade 1 diastolic dysfunction with indeterminate left ventricular filling pressure. No hemodynamically significant valvular disease.        MD ANDRES Naidu/ivette      This note was transcribed by Jordyn Cadet.  ivette/andres  The above service was transcribed by Jordyn Cadet, and I attest to the accuracy of the note.  BAP                 Electronically Signed by: Didi Cadet-, Other -Author on October 5, 2020 07:59:06 AM  Electronically Co-signed by: Jean Ortega MD -Reviewer on October 23, 2020 10:07:28 AM

## 2021-05-13 NOTE — PROGRESS NOTES
"   Progress Note      Patient Name: Jonas Mercado   Patient ID: [id number]   Sex: Male   YOB: 1950    Primary Care Provider: Yassine Corley MD   Referring Provider: Yassine Corley MD    Visit Date: November 5, 2020    Provider: Toby Gonzalez DPM   [place] [facility]   Location Address: [address] [place] [address]  [address] [place] [address]  [id number]   Location Phone: [phone]          Chief Complaint  · Bilateral Foot Pain      History Of Present Illness  Jonas Mercado is a 69 year old /White male who presents to the [facility] today new patient referred from Yassine Corley MD.      > R  Duration:  6 months  Onset:  gradual  Nature:  achy, sharp, shooting  Stable, worsening, improving:  modest improvement  Aggravating factors:    Patient describes morning bilateral heel pain as stabbing, burning, or aching. This pain usually subsides throughout the day, however it returns afterperiods of rest andsitting, when standing back up on their feet,and again the next morning.    Previous Treatment:  cortisone injections, DM shoes with custom-made orthotics (not wearing them today), night splint.    Patient denies any fevers, chills, nausea, vomiting, shortness of breathe, nor any other constitutional signs nor symptoms.      Pt states their most recent HgB A1C was 6.8.\">New, Established, New Problem:  est  Location:  Bilateral heel; Left >> R  Duration:  6 months  Onset:  gradual  Nature:  achy, sharp, shooting  Stable, worsening, improving:  modest improvement  Aggravating factors:    Patient describes morning bilateral heel pain as stabbing, burning, or aching. This pain usually subsides throughout the day, however it returns afterperiods of rest andsitting, when standing back up on their feet,and again the next morning.    Previous Treatment:  cortisone injections, DM shoes with custom-made orthotics (not wearing them today), night splint.    Patient " denies any fevers, chills, nausea, vomiting, shortness of breathe, nor any other constitutional signs nor symptoms.      Pt states their most recent HgB A1C was 6.8.       Past Medical History  Achilles tendon rupture; Ankle pain, left; Ankle pain, right; Arthritis; Corns and callus; Diabetes type 2, controlled; Foot pain, left; Hammertoe; Heel pain; Hyperlipemia; Hypertension; Numbness in feet; Pulmonary embolism         Past Surgical History  Back surgery; Carpal tunnel release; Foot surgery; Knee arthroscopy, diagnostic         Medication List  allopurinol 300 mg oral tablet; amlodipine 5 mg oral tablet; lisinopril-hydrochlorothiazide 20-12.5 mg oral tablet; metformin 1,000 mg oral tablet; metoprolol succinate 200 mg oral tablet extended release 24 hr; pregabalin 75 mg oral capsule; Tylenol-Codeine #3 300-30 mg oral tablet; Voltaren 1 % topical gel         Allergy List  aspirin; NO KNOWN DRUG ALLERGIES       Allergies Reconciled  Family Medical History  Family history of stroke; Family history of heart disease         Social History  Alcohol (Never); Tobacco (Former)         Review of Systems  · Constitutional  o Denies  o : fatigue, night sweats  · Eyes  o Denies  o : double vision, blurred vision  · HENT  o Denies  o : vertigo, recent head injury  · Cardiovascular  o Denies  o : chest pain, irregular heart beats  · Respiratory  o Denies  o : shortness of breath, productive cough  · Gastrointestinal  o Denies  o : nausea, vomiting  · Genitourinary  o Denies  o : dysuria, urinary retention  · Integument  o Denies  o : hair growth change, new skin lesions  · Neurologic  o Admits  o : tingling or numbness  o Denies  o : altered mental status, seizures  · Musculoskeletal  o * See HPI  · Endocrine  o Denies  o : cold intolerance, heat intolerance  · Heme-Lymph  o Denies  o : petechiae, lymph node enlargement or tenderness  · Allergic-Immunologic  o Denies  o : frequent illnesses      Vitals  Date Time BP Position  Site L\R Cuff Size HR RR TEMP (F) WT  HT  BMI kg/m2 BSA m2 O2 Sat FR L/min FiO2 HC       11/05/2020 01:03 /70 Sitting    64 - R  96.3 298lbs 0oz 6'   40.42 2.62 97 %      11/05/2020 01:03 /57 Sitting                       Physical Examination  · Constitutional  o Appearance  o : overweight, well developed  · Cardiovascular  o Peripheral Vascular System  o :   § Pedal Pulses  § : Pedal Pulses are +2 and symmetrical   § Extremities  § : 2+ edema present, no cyanosis, no distal hair loss, normal capillary refill  · Skin and Subcutaneous Tissue  o General Inspection  o : Skin is noted to have normal texture and turgor, with no excresences noted.  o Digits and Nails  o : The tonails are noted to be without disease.  · Neurologic  o Sensation  o : Sharp/dull sensation is diminished bilaterally. Monofilament sensation examination of the left foot is diminished. Monofilament sensation examination of the right foot is diminished.  · Left Ankle/Foot  o Inspection  o : Positive tenderness to palpaiton to the medial tubercle of the calcaneus. No signs of edema, erythema, lymphangitis, nor signs of infection.  · Injection Note/Aspiration Note  o Site  o : Left heel  o Procedure  o : This patient presents for a trigger point injection. I have discussed the nature, risks, benefits, alternatives and limitations of this procedure with this patient and obtained informed consent. The area was sterilely prepped with isopropyl alcohol. A 27 gauge, 1.25 inch needle was inserted iinto the affected area. The area was injected with 1 mL of the medication solution. A sterile dressing was applied to the area.  o Medication  o : 0.5ml of 1% lidocaine plain, 0.5ml of 40mg/ml Kenalog, and 0.5ml of 4mg/ml Dexamethasone   o Disposition  o : The patient tolerated the procedure well          Assessment  · Foot pain, left     729.5/M79.672  · Plantar fascial fibromatosis of left  foot     728.71/M72.2  · Diabetes     250.00/E11.9  · Neuropathy     355.9/G62.9      Plan  · Orders  o Decadron 4 mg/1cc Injection () - 728.71/M72.2, 729.5/M79.672 - 11/05/2020   Injection - Dexamethasone 4mg/mL; Dose: 2 mg; Site: Not Entered; Route: subcutaneous; Date: 11/05/2020 16:26:36; Exp: 12/31/2020; Lot: [id number]; Mfg: [employer]; TradeName: dexamethasone sodium phosphate; Location: [facility]; Administered By: Toby Gonzalez DPM; Comment: ndc [id number] inj site left foot  o 2.00 - Kenalog Injection 20mg (-3) - 728.71/M72.2, 729.5/M79.672 - 11/05/2020   Injection - Kenalog 20 mg; Dose: 20mg; Site: Not Entered; Route: subcutaneous; Date: 11/05/2020 16:27:29; Exp: 04/30/2022; Lot: [id number]; Mfg: [employer].; TradeName: triamcinolone acetonide; Location: [facility]; Administered By: Toby Gonzalez DPM; Comment: ndc [id number] inj site left foot  o Inj Tendon Sheath Or Ligament (41591) - 728.71/M72.2, 729.5/M79.672 - 11/05/2020  o Diabetic Foot (Motor and Sensory) Exam Completed [facility] (, , 2028F) - - 11/05/2020  o BMI above 25 and plan documented (, ) - - 11/05/2020  · Medications  o Medications have been Reconciled  o Transition of Care or Provider Policy  · Instructions  o Overview: This patient has a plantar fasciitis.  o Discuss Findings: I have discussed the findings of this evaluation with the patient. The discussion included a complete verbal explanation of any changes in the examination results, diagnosis, and the current treatment plan. A schedule for future care needs was explained. If any questions should arise after returning home, I have encouraged the patient to feel free to contact Dr. Gonzalez. The patient states understanding and agreement with this plan.  o Monitor For Problems: Pt to monitor for problems and to contact Dr. Gonzalez for follow-up should such signs occur. Patient states understanding and agreement with this plan.  o Treatement  Alternatives: Nonsurgical treatments almost always improve the pain. Treatment can last from several months to 2 years before symptoms get better. Most patients feel better in 9 months. Rarely Some people need surgery to relieve the pain.  o Conservative Treatment Recommendations: Anti-inflammatory medication to reduce pain and inflammation, Heel stretching exercises,   o Follow Up in 1 month. He asked for his toenails to be trimmed at that time.  o Discuss Findings: I have discussed the findings of this evaluation with the patient. The discussion included a complete verbal explanation of any changes in the examination results, diagnosis, and the current treatment plan. A schedule for future care needs was explained. If any questions should arise after returning home, I have encouraged the patient to feel free to contact Dr. Gonzalez. The patient states understanding and agreement with this plan.  o RTC one month  o Patient may begin to weight bear as tolerated in supportive shoes. No impact activities for two weeks. After that time, the patient may increase activities as tolerated. Patient states understanding and agreement with this plan.  · Disposition  o Call or Return if symptoms worsen or persist.            Electronically Signed by: Toby Gonzalez DPM -Author on November 6, 2020 06:57:07 AM

## 2021-05-14 ENCOUNTER — HOSPITAL ENCOUNTER (OUTPATIENT)
Dept: OTHER | Facility: HOSPITAL | Age: 71
Discharge: HOME | End: 2021-05-14
Attending: FAMILY MEDICINE

## 2021-05-14 ENCOUNTER — OFFICE VISIT CONVERTED (OUTPATIENT)
Dept: FAMILY MEDICINE CLINIC | Age: 71
End: 2021-05-14
Attending: FAMILY MEDICINE

## 2021-05-14 VITALS
HEIGHT: 72 IN | SYSTOLIC BLOOD PRESSURE: 132 MMHG | TEMPERATURE: 96.1 F | WEIGHT: 300 LBS | OXYGEN SATURATION: 96 % | DIASTOLIC BLOOD PRESSURE: 57 MMHG | BODY MASS INDEX: 40.63 KG/M2 | HEART RATE: 63 BPM

## 2021-05-14 VITALS
BODY MASS INDEX: 42.26 KG/M2 | TEMPERATURE: 94.2 F | OXYGEN SATURATION: 97 % | HEIGHT: 72 IN | WEIGHT: 312 LBS | DIASTOLIC BLOOD PRESSURE: 64 MMHG | HEART RATE: 57 BPM | SYSTOLIC BLOOD PRESSURE: 151 MMHG

## 2021-05-14 VITALS
HEART RATE: 64 BPM | TEMPERATURE: 96.3 F | SYSTOLIC BLOOD PRESSURE: 151 MMHG | WEIGHT: 298 LBS | OXYGEN SATURATION: 97 % | BODY MASS INDEX: 40.36 KG/M2 | DIASTOLIC BLOOD PRESSURE: 70 MMHG | HEIGHT: 72 IN

## 2021-05-14 VITALS
SYSTOLIC BLOOD PRESSURE: 155 MMHG | OXYGEN SATURATION: 97 % | TEMPERATURE: 96.7 F | BODY MASS INDEX: 40.63 KG/M2 | WEIGHT: 300 LBS | HEIGHT: 72 IN | DIASTOLIC BLOOD PRESSURE: 75 MMHG | HEART RATE: 71 BPM

## 2021-05-14 NOTE — PROGRESS NOTES
Progress Note      Patient Name: Jonas Mercado   Patient ID: [id number]   Sex: Male   YOB: 1950    Primary Care Provider: Yassine Corley MD   Referring Provider: Yassine Corley MD    Visit Date: January 7, 2021    Provider: Toby Gonzalez DPM   Location: [facility]   Location Address: [address] [place] [address]  [address] [place] [address]  [id number]   Location Phone: [phone]          Chief Complaint  · Routine Foot Care Visit      History Of Present Illness  Jonas Mercado complains of painful, elongated toenails which are thickened, yellowed, chalky, and cause pain with shoe gear and ambulation.      New, Established, New Problem:  New problem  Location:  Toenails  Duration:  Greater than five years  Onset:  Gradual  Nature:  sore with palpation.  Stable, worsening, improving:   Worsening  Aggravating factors:  Pain with shoe gear and ambulation.  Previous Treatment:  unable to trim his own toenails    Patient denies any fevers, chills, nausea, vomiting, shortness of breathe, nor any other constitutional signs nor symptoms.    Patient reports the following medical changes since their last visit:    - pt states he is trying to walk more for exerice.       Past Medical History  Achilles tendon rupture; Ankle pain, left; Ankle pain, right; Arthritis; Corns and callus; Diabetes type 2, controlled; Foot pain, left; Hammertoe; Heel pain; Hyperlipemia; Hypertension; Numbness in feet; Pulmonary embolism         Past Surgical History  Back surgery; Carpal tunnel release; Foot surgery; Knee arthroscopy, diagnostic         Medication List  allopurinol 300 mg oral tablet; amlodipine 5 mg oral tablet; atorvastatin 20 mg oral tablet; glipizide 5 mg oral tablet; lisinopril-hydrochlorothiazide 20-12.5 mg oral tablet; metformin 1,000 mg oral tablet; metoprolol succinate 200 mg oral tablet extended release 24 hr; pregabalin 75 mg oral capsule; Tylenol-Codeine #3 300-30 mg oral tablet; Voltaren  1 % topical gel         Allergy List  aspirin; NO KNOWN DRUG ALLERGIES       Allergies Reconciled  Family Medical History  Family history of stroke; Family history of heart disease         Social History  Alcohol (Never); Tobacco (Former)         Review of Systems  · Constitutional  o Denies  o : fever, chills  · Eyes  o Denies  o : double vision  · HENT  o Denies  o : vertigo, recent head injury, hearing loss or ringing  · Cardiovascular  o Denies  o : chest pain, irregular heart beats  · Gastrointestinal  o Denies  o : nausea, vomiting  · Integument  o * See HPI  · Neurologic  o * See HPI  · Musculoskeletal  o Denies  o : joint pain, joint swelling, limitation of motion      Vitals  Date Time BP Position Site L\R Cuff Size HR RR TEMP (F) WT  HT  BMI kg/m2 BSA m2 O2 Sat FR L/min FiO2 HC       01/07/2021 02:27 /57 Sitting    63 - R  96.1 300lbs 0oz 6'   40.69 2.63 96 %            Physical Examination  · Constitutional  o Appearance  o : overweight, well developed  · Respiratory  o Respiratory Effort  o : No labored breathing. Good respiratory effort.   · Cardiovascular  o Peripheral Vascular System  o :   § Pedal Pulses  § : Pedal Pulses are 2+ and symmetrical  § Extremities  § : There is no edema of the lower extremities  · Musculoskeletal  o Extremeties/Joint  o : Lower extremity muscle strength and range of motion is equal and symmetrical bilaterally. The knees are noted to be in normal alignment. Ankle alignment and range of motion is normal and foot structure is normal.  · Neurologic  o Sensation  o : Sharp/dull sensation is diminished bilaterally. Monofilament sensation examination of the left foot is diminished. Monofilament sensation examination of the right foot is diminished.  · Toes  o Toes: Right Foot  o :   § Toenails  § : Toenails are hypertrophic, mycotic, dystrophic, brittle toenail(s) at nail 1, 2, 3, 4, 5 with onycholysis of the right foot. The 1st, 2nd, 3rd, 4th, 5th toenail(s) on the right  have 2 mm in thickness with subungual detritus. There is an incurvated toenail at the distal border of the 1st, 2nd, 3rd, 4th, 5th toe  o Toes: Left Foot  o :   § Toenails  § : There are hypertrophic, mycotic, dystrophic, brittle toenail(s) at the 1, 2, 3, 4, 5 with onycholysis of the left foot. The 1st, 2nd, 3rd, 4th, 5th toenail(s) on the left have 2 mm in thickness with subungual detritus. There is an incurvated toenail at the distal border of the 1st, 2nd, 3rd, 4th, 5th toe  · Procedures  o Nail Debridement  o : Nail debridement is indicated for the following toenails:, left hallux, left 2nd toe, left 3rd toe, left 4th toe, left 5th toe, right hallux, right 2nd toe, right 3rd toe, right 4th toe, right 5th toe. The nail was debrided of excessive thickness to appropriate levels of comfort and contour using, nail nippers. The procedure was without complications          Assessment  · Diabetes mellitus type 2, noninsulin dependent       Type 2 diabetes mellitus without complications     250.00/E11.9  · Diabetic neuropathy       Type 2 diabetes mellitus with diabetic neuropathy, unspecified     250.60/E11.40  · Foot pain, bilateral       Pain in right foot     729.5/M79.671  Pain in left foot     729.5/M79.672  · Ingrowing nail     703.0/L60.0  · Tinea unguium     110.1/B35.1      Plan  · Orders  o Debridement of six or more nails (66230) - - 01/07/2021  o Diabetic Foot (Motor and Sensory) Exam Completed [facility] (, , 2028F) - - 01/07/2021  · Medications  o Medications have been Reconciled  o Transition of Care or Provider Policy  · Instructions  o Follow Up in 9 weeks  o I have discussed the findings of this evaluation with the patient. The discussion included a complete verbal explanation of any changes in the examination results, diagnosis, and the current treatment plan. A schedule for future care needs was explained. If any questions should arise after returning home, I have encouraged the patient to feel  free to contact Dr. Gonzalez. The patient states understanding and agreement with this plan.   o Pt to monitor for problems and to contact Dr. Gonzalez for follow-up should such signs occur. Patient states understanding and agreement with this plan.   o Onychomycosis is present in 2-3% of the population with the most common source of contamination coming from the patient's own skin. Fifteen to 20 percent of people between the ages of 40 and 60 have onychomycosis, 32 percent of 60- to 70-[id number]-urls have nail fungus and approximately 50 percent of those over 70 are afflicted. Seventy-five percent of the people who have this infection exhibit psychosocial concerns. These people face the dilemma of not being able to go to the swimming pool, public shower areas or even wear open-toed sandals. More important is the fact that 48 percent of the people with onychomycosis have pain. The pain is intense enough to have these patients miss 1.8 days of work on average over a six-month period. Traditional topical therapies used alone are generally ineffective for clearing the primary infection, and even oral therapy is associated with a high rate of initial treatment failure or recurrence. In many patients combination oral and topical therapy is the treatment of choice.   o I have recommended debridement of the devitalized or contaminated tissue. Debridement will relieve the pressure of the necrotic presence of on the nail and provides for a better cosmetic appearance. Debulking the nail does help in combination with other treatments in that it can decrease the fungal load of the nail itself.   o Electronically Identified Patient Education Materials Provided Electronically  · Disposition  o Call or Return if symptoms worsen or persist.            Electronically Signed by: Toby Gonzalez DPM -Author on January 7, 2021 02:47:15 PM

## 2021-05-14 NOTE — PROGRESS NOTES
Progress Note      Patient Name: Jonas Mercado   Patient ID: [id number]   Sex: Male   YOB: 1950    Primary Care Provider: Yassine Corley MD   Referring Provider: Yassine Corley MD    Visit Date: April 1, 2021    Provider: Toby Gonzalez DPM   Location: [facility]   Location Address: [address] [place] [address]  [address] [place] [address]  [id number]   Location Phone: [phone]          Chief Complaint  · Routine Foot Care Visit      History Of Present Illness  Jonas Mercado complains of painful, elongated toenails which are thickened, yellowed, chalky, and cause pain with shoe gear and ambulation.      New, Established, New Problem:  est  Location:  Toenails  Duration:  Greater than five years  Onset:  Gradual  Nature:  sore with palpation.  Stable, worsening, improving:   improving  Aggravating factors:  Pain with shoe gear and ambulation.  Previous Treatment:  debridement    Patient denies any fevers, chills, nausea, vomiting, shortness of breathe, nor any other constitutional signs nor symptoms.           Past Medical History  Achilles tendon rupture; Ankle pain, left; Ankle pain, right; Arthritis; Corns and callus; Diabetes type 2, controlled; Foot pain, left; Hammertoe; Heel pain; Hyperlipemia; Hypertension; Numbness in feet; Pulmonary embolism         Past Surgical History  Back surgery; Carpal tunnel release; Foot surgery; Knee arthroscopy, diagnostic         Medication List  allopurinol 300 mg oral tablet; amlodipine 5 mg oral tablet; atorvastatin 20 mg oral tablet; glipizide 5 mg oral tablet; lisinopril-hydrochlorothiazide 20-12.5 mg oral tablet; metformin 1,000 mg oral tablet; metoprolol succinate 200 mg oral tablet extended release 24 hr; pregabalin 75 mg oral capsule; Tylenol-Codeine #3 300-30 mg oral tablet; Voltaren 1 % topical gel         Allergy List  aspirin; NO KNOWN DRUG ALLERGIES       Allergies Reconciled  Family Medical History  Family history of stroke;  Family history of heart disease         Social History  Alcohol (Never); Tobacco (Former)         Review of Systems  · Constitutional  o Denies  o : fever, chills  · Eyes  o Denies  o : double vision  · HENT  o Denies  o : vertigo, recent head injury, hearing loss or ringing  · Cardiovascular  o Denies  o : chest pain, irregular heart beats  · Gastrointestinal  o Denies  o : nausea, vomiting  · Integument  o * See HPI  · Neurologic  o * See HPI  · Musculoskeletal  o Denies  o : joint pain, joint swelling, limitation of motion      Physical Examination  · Constitutional  o Appearance  o : overweight, well developed  · Respiratory  o Respiratory Effort  o : No labored breathing. Good respiratory effort.   · Cardiovascular  o Peripheral Vascular System  o :   § Pedal Pulses  § : Pedal Pulses are 2+ and symmetrical  § Extremities  § : There is no edema of the lower extremities  · Musculoskeletal  o Extremeties/Joint  o : Lower extremity muscle strength and range of motion is equal and symmetrical bilaterally. The knees are noted to be in normal alignment. Ankle alignment and range of motion is normal and foot structure is normal.  · Neurologic  o Sensation  o : Sharp/dull sensation is diminished bilaterally. Monofilament sensation examination of the left foot is diminished. Monofilament sensation examination of the right foot is diminished.  · Toes  o Toes: Right Foot  o :   § Toenails  § : Toenails are hypertrophic, mycotic, dystrophic, brittle toenail(s) at nail 1, 2, 3, 4, 5 with onycholysis of the right foot. The 1st, 2nd, 3rd, 4th, 5th toenail(s) on the right have 2 mm in thickness with subungual detritus. There is an incurvated toenail at the distal border of the 1st, 2nd, 3rd, 4th, 5th toe  o Toes: Left Foot  o :   § Toenails  § : There are hypertrophic, mycotic, dystrophic, brittle toenail(s) at the 1, 2, 3, 4, 5 with onycholysis of the left foot. The 1st, 2nd, 3rd, 4th, 5th toenail(s) on the left have 2 mm in  thickness with subungual detritus. There is an incurvated toenail at the distal border of the 1st, 2nd, 3rd, 4th, 5th toe  · Procedures  o Nail Debridement  o : Nail debridement is indicated for the following toenails:, left hallux, left 2nd toe, left 3rd toe, left 4th toe, left 5th toe, right hallux, right 2nd toe, right 3rd toe, right 4th toe, right 5th toe. The nail was debrided of excessive thickness to appropriate levels of comfort and contour using, nail nippers. The procedure was without complications          Assessment  · Diabetes mellitus type 2, noninsulin dependent       Type 2 diabetes mellitus without complications     250.00/E11.9  · Diabetic neuropathy       Type 2 diabetes mellitus with diabetic neuropathy, unspecified     250.60/E11.40  · Foot pain, bilateral       Pain in right foot     729.5/M79.671  Pain in left foot     729.5/M79.672  · Ingrowing nail     703.0/L60.0  · Tinea unguium     110.1/B35.1      Plan  · Orders  o Debridement of six or more nails (39944) - - 04/01/2021  o Diabetic Foot (Motor and Sensory) Exam Completed [facility] (, , 2028F) - - 04/01/2021  · Medications  o Medications have been Reconciled  o Transition of Care or Provider Policy  · Instructions  o Follow Up in 9 weeks  o I have discussed the findings of this evaluation with the patient. The discussion included a complete verbal explanation of any changes in the examination results, diagnosis, and the current treatment plan. A schedule for future care needs was explained. If any questions should arise after returning home, I have encouraged the patient to feel free to contact Dr. Gonzalez. The patient states understanding and agreement with this plan.   o Pt to monitor for problems and to contact Dr. Gonzalez for follow-up should such signs occur. Patient states understanding and agreement with this plan.   o Onychomycosis is present in 2-3% of the population with the most common source of contamination coming from  the patient's own skin. Fifteen to 20 percent of people between the ages of 40 and 60 have onychomycosis, 32 percent of 60- to [id number] have nail fungus and approximately 50 percent of those over 70 are afflicted. Seventy-five percent of the people who have this infection exhibit psychosocial concerns. These people face the dilemma of not being able to go to the swimming pool, public shower areas or even wear open-toed sandals. More important is the fact that 48 percent of the people with onychomycosis have pain. The pain is intense enough to have these patients miss 1.8 days of work on average over a six-month period. Traditional topical therapies used alone are generally ineffective for clearing the primary infection, and even oral therapy is associated with a high rate of initial treatment failure or recurrence. In many patients combination oral and topical therapy is the treatment of choice.   o I have recommended debridement of the devitalized or contaminated tissue. Debridement will relieve the pressure of the necrotic presence of on the nail and provides for a better cosmetic appearance. Debulking the nail does help in combination with other treatments in that it can decrease the fungal load of the nail itself.   o Electronically Identified Patient Education Materials Provided Electronically  · Disposition  o Call or Return if symptoms worsen or persist.            Electronically Signed by: Toby Gonzalez DPM -Author on April 1, 2021 01:03:25 PM

## 2021-05-15 LAB
ALBUMIN SERPL-MCNC: 4 G/DL (ref 3.5–5)
ALBUMIN/GLOB SERPL: 1.2 {RATIO} (ref 1.4–2.6)
ALP SERPL-CCNC: 109 U/L (ref 56–155)
ALT SERPL-CCNC: 12 U/L (ref 10–40)
ANION GAP SERPL CALC-SCNC: 15 MMOL/L (ref 8–19)
AST SERPL-CCNC: 13 U/L (ref 15–50)
BILIRUB SERPL-MCNC: 0.25 MG/DL (ref 0.2–1.3)
BUN SERPL-MCNC: 23 MG/DL (ref 5–25)
BUN/CREAT SERPL: 20 {RATIO} (ref 6–20)
CALCIUM SERPL-MCNC: 9.3 MG/DL (ref 8.7–10.4)
CHLORIDE SERPL-SCNC: 99 MMOL/L (ref 99–111)
CHOLEST SERPL-MCNC: 131 MG/DL (ref 107–200)
CHOLEST/HDLC SERPL: 3 {RATIO} (ref 3–6)
CONV CO2: 29 MMOL/L (ref 22–32)
CONV TOTAL PROTEIN: 7.4 G/DL (ref 6.3–8.2)
CREAT UR-MCNC: 1.14 MG/DL (ref 0.7–1.2)
EST. AVERAGE GLUCOSE BLD GHB EST-MCNC: 186 MG/DL
GFR SERPLBLD BASED ON 1.73 SQ M-ARVRAT: >60 ML/MIN/{1.73_M2}
GLOBULIN UR ELPH-MCNC: 3.4 G/DL (ref 2–3.5)
GLUCOSE SERPL-MCNC: 169 MG/DL (ref 70–99)
HBA1C MFR BLD: 8.1 % (ref 3.5–5.7)
HDLC SERPL-MCNC: 44 MG/DL (ref 40–60)
LDLC SERPL CALC-MCNC: 54 MG/DL (ref 70–100)
OSMOLALITY SERPL CALC.SUM OF ELEC: 296 MOSM/KG (ref 273–304)
POTASSIUM SERPL-SCNC: 3.8 MMOL/L (ref 3.5–5.3)
SODIUM SERPL-SCNC: 139 MMOL/L (ref 135–147)
TRIGL SERPL-MCNC: 166 MG/DL (ref 40–150)
VLDLC SERPL-MCNC: 33 MG/DL (ref 5–37)

## 2021-05-18 NOTE — PROGRESS NOTES
Jonas Mercado Gene  1950     Office/Outpatient Visit    Visit Date: Wed, Dec 23, 2020 02:02 pm    Provider: Chava Desouza MD (Assistant: Mimi Brooks MA)    Location: [facility]        Electronically signed by Chava Desouza MD on  12/23/2020 06:12:27 PM                             Subjective:        CC: Jonas is a 70 year old White male.  This is a follow-up visit.  establish care with new pcp, 6 month follow up         HPI:           Jonas presents with type 2 diabetes mellitus with unspecified complications.  Specifically, this is type 2, non-insulin requiring diabetes without complications.  Compliance with treatment has been good; he takes his medication as directed and follows up as directed.  He denies experiencing any diabetes related symptoms.  Current meds include an oral hypoglycemic ( Glucophage ( 1000mg bid ) and Glucotrol ( 5 mg BID ) ).  Most recent lab results include Hemoglobin A1c:  6.9 (%) (09/02/2020).  Concurrent health problems include hypertension.            Concerning essential (primary) hypertension, his current cardiac medication regimen includes a diuretic ( HCTZ 25 mg QD ), a beta-blocker ( TOprol  mg daily ), an ACE inhibitor ( Lisinopril 40 mg BID ), and a calcium channel blocker ( amlodipine 5 mg daily ).  Compliance with treatment has been good; he takes his medication as directed and follows up as directed.  He is tolerating the medication well without side effects.  He has not kept a blood pressure diary, but states that pressures have been too high.        Pertaining to gout, Jonas reports that he can't recall when his last flare was. all His current regimen includes allopurinol 300 mg daily.        Jonas has a longstanding history of low back pain.  In June she underwent L4-S5 fusion and decompression.  He reports that his pain is slowly improving postoperatively.  His current regimen includes Tylenol 3 and Lyrica 75 mg twice daily he also  reports that he has been experiencing widespread joint pain and arthritis for several years.  He wonders if it is possible he could have psoriatic or rheumatoid arthritis.    ROS:     CONSTITUTIONAL:  Negative for fatigue and fever.      EYES:  Negative for blurred vision.      E/N/T:  Negative for diminished hearing and nasal congestion.      CARDIOVASCULAR:  Negative for chest pain, palpitations and pedal edema.      RESPIRATORY:  Negative for recent cough and dyspnea.      GASTROINTESTINAL:  Negative for abdominal pain, constipation, diarrhea, nausea and vomiting.      MUSCULOSKELETAL:  Positive for arthralgias, (plantar fascitis of left foot) back pain and limb pain ( right leg pain ).   Negative for myalgias.      INTEGUMENTARY:  Negative for atypical mole(s) and rash.      NEUROLOGICAL:  Positive for paresthesia ( left lower extremity ).   Negative for weakness.      PSYCHIATRIC:  Negative for anxiety, depression and sleep disturbance.          Past Medical History / Family History / Social History:         Last Reviewed on 2020 06:12 PM by Chava Desouza    Past Medical History:             PAST MEDICAL HISTORY         Type 2 Diabetes    Hypertension     Pulmonary Embolism: in ;     Gout     Peripheral Sensory Neuropathy     Hospitalizations: [facility], admitted once on          PREVENTIVE HEALTH MAINTENANCE             COLORECTAL CANCER SCREENING:; colonoscopy with normal results;          Surgical History:     1972     plantar fascia release 10/9/2018;         Family History:     Father:  at age 69; Cause of death was stroke;  Coronary Artery Disease     Mother:  at age 93; Cause of death was Old age;  Alzheimer's Disease         Social History:     Occupation: Retired (Prior occupation: [employer])     Marital Status:      Children: 2 children     Hobbies/Recreation: he enjoys fishing and hunting;     Exercise: Primary form of exercise is walking.           Tobacco/Alcohol/Supplements:     Last Reviewed on 12/23/2020 06:12 PM by Chava Desouza    Tobacco: He has a past history of cigarette smoking; quit date:  2-1998.          Alcohol: Frequency:    2 or 3x/year; When he drinks, the average quantity of alcohol is 1 drinks.   He typically consumes beer.      Caffeine:  He admits to consuming caffeine via coffee ( 3 servings per day ) and soda ( 2-3 servings per day ).          Substance Abuse History:     Last Reviewed on 12/23/2020 06:12 PM by Chava Desouza    None         Mental Health History:     Last Reviewed on 12/23/2020 06:12 PM by Chava Desouza    NEGATIVE         Communicable Diseases (eg STDs):     Last Reviewed on 12/23/2020 06:12 PM by Chava Desouza        Current Problems:     Last Reviewed on 12/23/2020 06:12 PM by Chava Desouza    Type 2 diabetes mellitus with unspecified complications    Essential (primary) hypertension    Gout, unspecified    Radiculopathy, lumbosacral region    Long term (current) use of opiate analgesic    Personal history of pulmonary embolism    Other specified disorders involving the immune mechanism, not elsewhere classified    Non-pressure chronic ulcer of other part of right foot limited to breakdown of skin    Other long term (current) drug therapy    Personal history of diabetic foot ulcer    Encounter for other preprocedural examination    Encounter for screening for depression    Encounter for screening for cardiovascular disorders    Encounter for screening for malignant neoplasm of prostate    Encounter for general adult medical examination without abnormal findings    Shortness of breath    Obstructive sleep apnea (adult) (pediatric)    Pain in unspecified ankle and joints of unspecified foot    Encounter for immunization    Plantar fascial fibromatosis    Pain in unspecified joint        Immunizations:     influenza, high-dose, quadrivalent (FLUZONE HIGH-DOSE QUAD 2020-21) 9/30/2020    Fluzone Quadrivalent (3+ years)  10/15/2019        Allergies:     Last Reviewed on 12/23/2020 06:12 PM by Chava Desouza    Aspirin (ASA):          Current Medications:     Last Reviewed on 12/23/2020 06:12 PM by Chava Desouza    metFORMIN 1,000 mg oral tablet [take 1 tablet (1,000 mg) by oral route 2 times per day with morning and evening meals]    lisinopriL 40 mg oral tablet [take 1 tablet (40 mg) daily]    allopurinoL 300 mg oral tablet [TAKE 1 TABLET DAILY]    Toprol  mg oral Tablet, Extended Release 24 hr [Take 1 tablet(s) by mouth daily]    amLODIPine 5 mg oral tablet [1 tab daily]    glipiZIDE 5 mg oral tablet [TAKE 1 TABLET BY MOUTH BEFORE BREAKFAST AND EVENING MEAL]    Lyrica 75 mg oral capsule [take 1 capsule (75 mg) by oral route 2 times per day]    atorvastatin 20 mg oral tablet [TAKE 1 TABLET DAILY]    lancets 30 gauge and blood glucose strips combo pack  [Use as directed BID to check blood sugar]    DICLOFENAC SODIUM 1% GEL     hydroCHLOROthiazide 25 mg oral tablet [take 1 tablet (25 mg) by oral route once a day ]    acetaminophen-codeine 300-30 mg oral tablet [1 p.o. TID PRN ]        Objective:        Vitals:         Current: 12/23/2020 2:11:13 PM    Ht:  5 ft, 10.5 in;  Wt: 302 lbs;  BMI: 42.7T: 97.5 F (temporal);  BP: 156/68 mm Hg (right arm, sitting);  P: 63 bpm (right arm (BP Cuff), sitting);  sCr: 1.19 mg/dL;  GFR: 70.43        Exams:     PHYSICAL EXAM:     GENERAL: Vitals recorded well developed, well nourished,  morbidly obese;  well groomed;  no apparent distress;     EYES: extraocular movements intact; conjunctiva and cornea are normal; PERRLA;     NECK: range of motion is normal; trachea is midline;     RESPIRATORY: normal respiratory rate and pattern with no distress; normal breath sounds with no rales, rhonchi, wheezes or rubs;     CARDIOVASCULAR: normal rate; rhythm is regular;  no systolic murmur; 1+ pedal edema;     NEUROLOGIC: mental status: alert and oriented x 3; GROSSLY INTACT     PSYCHIATRIC:  appropriate  affect and demeanor; normal speech pattern; grossly normal memory;         Assessment:         E11.8   Type 2 diabetes mellitus with unspecified complications       I10   Essential (primary) hypertension       M10.9   Gout, unspecified       M54.17   Radiculopathy, lumbosacral region       M25.50   Pain in unspecified joint           ORDERS:         Meds Prescribed:       [Refilled] amLODIPine 10 mg oral tablet [take 1 tablet (10 mg) by oral route once daily], #90 (ninety) tablets, Refills: 1 (one)         Lab Orders:       [id number]  A1CEG - [facility] Hemoglobin A1C  (Send-Out)            [id number]  RAPII - [facility] Arthritis Profile  (Send-Out)            26148  URIC - [facility] Uric Acid, Serum  (Send-Out)                      Plan:         Type 2 diabetes mellitus with unspecified complications- Controlled.  Continue metformin 1000 twice daily and glipizide 5 mg twice daily.  A1c ordered today.    LABORATORY:  Labs ordered to be performed today include HgbA1C.            Orders:       [id number]  A1CEG - [facility] Hemoglobin A1C  (Send-Out)              Essential (primary) hypertensionNot controlled.  Increase amlodipine to 10 mg daily.  However, we will have to decrease lisinopril as he is taking 40 mg twice daily and the max dose is 40 mg daily. Continue Toprol- mg daily and HCTZ 25 mg daily. RTC in 1 mo          Prescriptions:       [Refilled] amLODIPine 10 mg oral tablet [take 1 tablet (10 mg) by oral route once daily], #90 (ninety) tablets, Refills: 1 (one)         Gout, unspecifiedStable.  Continue allopurinol 300 mg daily.  Uric acid level ordered today.          Orders:       [id number]  URIC - [facility] Uric Acid, Serum  (Send-Out)              Radiculopathy, lumbosacral regionChronic/stable.  Continue Lyrica 75 mg twice daily and Tylenol threes as needed.        Pain in unspecified jointUnclear etiology.  Inflammatory arthritis panel ordered.    LABORATORY:  Labs ordered to be performed today include Arthritis Profile.            Orders:        [id number]  [facility] Arthritis Profile  (Send-Out)                  Patient Recommendations:        For  Pain in unspecified joint:    I also recommend ^.              Charge Capture:         Primary Diagnosis:     E11.8  Type 2 diabetes mellitus with unspecified complications           Orders:      78010  Office/outpatient visit; established patient, level 4  (In-House)              I10  Essential (primary) hypertension     M10.9  Gout, unspecified     M54.17  Radiculopathy, lumbosacral region     M25.50  Pain in unspecified joint

## 2021-05-18 NOTE — PROGRESS NOTES
Jonas Mercado Gene  1950     Office/Outpatient Visit    Visit Date: Wed, Sep 2, 2020 01:09 pm    Provider: Yassine Corley MD (Assistant: Rachell Mcintosh MA)    Location: [facility]        Electronically signed by Yassine Corley MD on  09/02/2020 08:04:36 PM                             Subjective:        CC: Jonas is a 69 year old White male.  This is a follow-up visit.          HPI:           PHQ-9 Depression Screening: Completed form scanned and in chart; Total Score 0       BP today is 132/61 with a HR of 60. He is on lisinopril/HCTZ 20/12.5 mg 2 tab qd, metoprolol 200 mg qd, and amlodipine 5 mg.      A1c was 7.2 on 6/2/20. He is on metfromin ER 1,000 mg BID and glipizide 5 mg BID. He checks glucose once a day and its typically about 110 - 150. Diet is OK. Exercise is limited by pain. pt saw diabetic educator on 3/5/20.      Pt had anterior/posterior fusion and decompression from L4-S1 on June 16th. He says this went great, he has no radiculopathy, just soreness. He saw Dr. Holley on 8/28 and he indicated all was well. He has completed PT. He is on lyrica 75 mg BID (more for foot pain) and tylenol #3 (more for ankles).      Pt gets short of breath doing yard work, he has significant swelling of lower extremities for about the last year. He has a h/o PE but no other cardiac issues, no h/o MI or CHF.       Pt went to [facility] back in December for non-healing diabetic foot ulcer, he had surgery in January and this went well. He had a steroid shot in left foot for plantar fascitis in June. But since then he has been unable to get ahold of them and the clinic seems to be closed. He continues to have pain and swelling in both ankles and plantar fascitis in left foot. This is better with tylenol #3 and lyrica 75 mg qd.       Pt has KARLA and he needs a referral for a sleep study to get a new CPAP.    ROS:     CONSTITUTIONAL:  Negative for fatigue and fever.      EYES:   Negative for blurred vision.      E/N/T:  Negative for diminished hearing and nasal congestion.      CARDIOVASCULAR:  Positive for pedal edema ( moderate; severe ).   Negative for chest pain or palpitations.      RESPIRATORY:  Positive for dyspnea ( with moderate exertion ).   Negative for recent cough.      GASTROINTESTINAL:  Negative for abdominal pain, constipation, diarrhea, nausea and vomiting.      MUSCULOSKELETAL:  Positive for arthralgias, (plantar fascitis of left foot) back pain and limb pain ( right leg pain ).   Negative for myalgias.      INTEGUMENTARY:  Negative for atypical mole(s) and rash.      NEUROLOGICAL:  Positive for paresthesia ( left lower extremity ).   Negative for weakness.      PSYCHIATRIC:  Negative for anxiety, depression and sleep disturbance.          Past Medical History / Family History / Social History:         Last Reviewed on 2020 07:54 PM by Yassine Corley    Past Medical History:             PAST MEDICAL HISTORY         Type 2 Diabetes    Hypertension     Pulmonary Embolism: in ;     Gout     Peripheral Sensory Neuropathy     Hospitalizations: [facility], admitted once on          PREVENTIVE HEALTH MAINTENANCE             COLORECTAL CANCER SCREENING:; colonoscopy with normal results;          Surgical History:     1972     plantar fascia release 10/9/2018;         Family History:     Father:  at age 69; Cause of death was stroke;  Coronary Artery Disease     Mother:  at age 93; Cause of death was Old age;  Alzheimer's Disease         Social History:     Occupation: Retired (Prior occupation: [employer])     Marital Status:      Children: 2 children     Hobbies/Recreation: he enjoys fishing and hunting;     Exercise: Primary form of exercise is walking.          Tobacco/Alcohol/Supplements:     Last Reviewed on 2020 07:54 PM by Yassine Corley    Tobacco: He has a past history of cigarette smoking; quit date:  .          Alcohol:  Frequency:    2 or 3x/year; When he drinks, the average quantity of alcohol is 1 drinks.   He typically consumes beer.      Caffeine:  He admits to consuming caffeine via coffee ( 3 servings per day ) and soda ( 2-3 servings per day ).          Substance Abuse History:     Last Reviewed on 9/02/2020 07:54 PM by Yassine Corley    None         Mental Health History:     Last Reviewed on 9/02/2020 07:54 PM by Yassine Corley    NEGATIVE         Communicable Diseases (eg STDs):     Last Reviewed on 9/02/2020 07:54 PM by Yassine Corley        Current Problems:     Last Reviewed on 9/02/2020 07:54 PM by Yassine Corley    Type 2 diabetes mellitus with unspecified complications    Essential (primary) hypertension    Gout, unspecified    Radiculopathy, lumbosacral region    Long term (current) use of opiate analgesic    Personal history of pulmonary embolism    Other specified disorders involving the immune mechanism, not elsewhere classified    Non-pressure chronic ulcer of other part of right foot limited to breakdown of skin    Other long term (current) drug therapy    Personal history of diabetic foot ulcer    Encounter for other preprocedural examination    Encounter for screening for depression    Encounter for screening for cardiovascular disorders    Encounter for screening for malignant neoplasm of prostate    Encounter for general adult medical examination without abnormal findings    Obstructive sleep apnea (adult) (pediatric)    Shortness of breath    Pain in unspecified ankle and joints of unspecified foot        Immunizations:     Fluzone Quadrivalent (3+ years) 10/15/2019        Allergies:     Last Reviewed on 9/02/2020 07:54 PM by Yassine Corley    Aspirin (ASA):          Current Medications:     Last Reviewed on 9/02/2020 07:54 PM by Yassine Corley    amLODIPine 5 mg oral tablet [1 tab daily]    Toprol  mg oral Tablet, Extended Release 24 hr [Take 1 tablet(s) by mouth daily]    metFORMIN  1,000 mg oral tablet [take 1 tablet (1,000 mg) by oral route 2 times per day with morning and evening meals]    lisinopril-hydrochlorothiazide 20-12.5 mg oral tablet [Take 2 tablet(s) by mouth daily]    allopurinoL 300 mg oral tablet [TAKE 1 TABLET DAILY]    acetaminophen-codeine 300-30 mg oral tablet [1 p.o. BID prn pain]    glipiZIDE 5 mg oral tablet [TAKE 1 TABLET BY MOUTH BEFORE BREAKFAST AND EVENING MEAL]    Lyrica 75 mg oral capsule [take 1 capsule (75 mg) by oral route 2 times per day]    atorvastatin 20 mg oral tablet [TAKE 1 TABLET DAILY]    lancets 30 gauge and blood glucose strips combo pack  [Use as directed BID to check blood sugar]        Objective:        Vitals:         Current: 9/2/2020 1:16:53 PM    Ht:  5 ft, 10.5 in;  Wt: 288.2 lbs;  BMI: 40.8T: 97.6 F (oral);  BP: 132/61 mm Hg (right arm, sitting);  P: 60 bpm (right arm (BP Cuff), sitting);  sCr: 1.02 mg/dL;  GFR: 81.66        Exams:     PHYSICAL EXAM:     GENERAL: Vitals recorded well developed, well nourished,  morbidly obese;  well groomed;  no apparent distress;     EYES: extraocular movements intact; conjunctiva and cornea are normal; PERRLA;     E/N/T: OROPHARYNX:  normal mucosa, dentition, gingiva, and posterior pharynx;     NECK: range of motion is normal; trachea is midline;     RESPIRATORY: normal respiratory rate and pattern with no distress; normal breath sounds with no rales, rhonchi, wheezes or rubs;     CARDIOVASCULAR: normal rate; rhythm is regular;  no systolic murmur; 2+ pedal and to upper shins bilaterally edema;     GASTROINTESTINAL: nontender; normal bowel sounds;     MUSCULOSKELETAL: gait: affected by a left leg limp and slowed;  normal overall tone spine: flattened lumbar curve;     NEUROLOGIC: mental status: alert and oriented x 3; GROSSLY INTACT     PSYCHIATRIC:  appropriate affect and demeanor; normal speech pattern; grossly normal memory;         Lab/Test Results:         Amphetamines Screen, Urin: Negative  (09/02/2020),     BAR-Barbiturates Screen, Urin: Negative (09/02/2020),     Buprenorphine: Negative (09/02/2020),     BZO-Benzodiazepines Screen,Ur: Negative (09/02/2020),     Cocaine(Metab.)Screen, Ur: Negative (09/02/2020),     MDMA-Ecstasy: Negative (09/02/2020),     Met-Methamphetamine: Negative (09/02/2020),     MTD-Methadone Screen, Urine: Negative (09/02/2020),     Opiate Screen, Urine: Positive (09/02/2020),     OXY-Oxycodone: Negative (09/02/2020),     PCP-Phencyclidine Screen, Uri: Negative (09/02/2020),     THC Cannabinoids Screen, Urin: Negative (09/02/2020),     Urine temperature: confirmed (09/02/2020),     Date and time of last pill: tylenol #3 9-1-20 at 2100, lyrica 9-2-20 at 0900/kh (09/02/2020),     Performed by: tls (09/02/2020),     Collection Time: 1325 (09/02/2020),             Assessment:         Z13.31   Encounter for screening for depression       Z79.891   Long term (current) use of opiate analgesic       I10   Essential (primary) hypertension       E11.8   Type 2 diabetes mellitus with unspecified complications       M54.17   Radiculopathy, lumbosacral region       R06.02   Shortness of breath       M25.579   Pain in unspecified ankle and joints of unspecified foot       G47.33   Obstructive sleep apnea (adult) (pediatric)           ORDERS:         Meds Prescribed:       [Refilled] acetaminophen-codeine 300-30 mg oral tablet [1 p.o. qd PRN ], #30 (thirty) tablets, Refills: 0 (zero)         Radiology/Test Orders:       [id number]  Echocardiography, transthoracic, real-time w image (2D), w M-mode, w spectral & color flow Doppler  (Send-Out)            [id number]  Radiologic examination, right ankle complete minimum 3 views  (Send-Out)            [id number]  Radiologic examination, left ankle; complete minimum 3 views  (Send-Out)            56296  Radiologic exam, Bilateral Foot; comp, 3 views  (Send-Out)              Lab Orders:       [id number]  Drug test prsmv read direct optical obs pr date  (In-House)             47105  [facility] CBC with 3 part diff  (Send-Out)            74661  [facility] Comp. Metabolic Panel  (Send-Out)            65366  A1CEG - [facility] Hemoglobin A1C  (Send-Out)            28023  TSH [facility] TSH  (Send-Out)              Procedures Ordered:       REFER  Referral to Specialist or Other Facility  (Send-Out)            REFER  Referral to Specialist or Other Facility  (Send-Out)              Other Orders:         Depression screen negative  (In-House)                      Plan:         Encounter for screening for depression    MIPS PHQ-9 Depression Screening: Completed form scanned and in chart; Total Score 0; Negative Depression Screen           Orders:         Depression screen negative  (In-House)              Long term (current) use of opiate analgesic          Orders:       [id number]  Drug test prsmv read direct optical obs pr date  (In-House)              Essential (primary) hypertensionBP is relatively well controlled, cont lisinopril/HCTZ 20/12.5 mg 2 tab qd, metoprolol 200 mg qd, and amlodipine 5 mg.         Type 2 diabetes mellitus with unspecified complicationsDM 2 is moderately well controlled with A1c of 7.2. Cont metfromin ER 1,000 mg BID and glipizide 5 mg BID. Hopefully he wuill be able to exercise more when foot and ankle pain has improved.     LABORATORY:  Labs ordered to be performed today include CBC, Comprehensive metabolic panel, HgbA1C, and TSH.            Orders:       [id number]  [facility] CBC with 3 part diff  (Send-Out)            93463  [facility] Comp. Metabolic Panel  (Send-Out)            08681  A1CEG - [facility] Hemoglobin A1C  (Send-Out)            67578  TSH [facility] TSH  (Send-Out)              Radiculopathy, lumbosacral regionBack pain and radiculopathy have improved s/p anterior/posterior fusion and decompression from L4-S1 on June 16th.         Shortness of breathShortness of breath and edema is concerning for CHF, ECHO is ordered to assess.         TESTS/PROCEDURES:   Will proceed with ECHO to be performed/scheduled now.            Orders:       [id number]  Echocardiography, transthoracic, real-time w image (2D), w M-mode, w spectral & color flow Doppler  (Send-Out)              Pain in unspecified ankle and joints of unspecified footBilateral pain in ankles and feet is limiting patient's mobility. He is referred to podiatry and x-rays are ordered to assess. Will refill lyrica 75 mg BID and tylenol #3 when needed.         RADIOLOGY:  I have ordered a right left ankle xray and a bilateral foot x-ray to be done today.      REFERRALS:  Referral initiated to a podiatrist ( Toby Gonzalez [facility] ).            Prescriptions:       [Refilled] acetaminophen-codeine 300-30 mg oral tablet [1 p.o. qd PRN ], #30 (thirty) tablets, Refills: 0 (zero)           Orders:       REFER  Referral to Specialist or Other Facility  (Send-Out)            [id number]  Radiologic examination, right ankle complete minimum 3 views  (Send-Out)            [id number]  Radiologic examination, left ankle; complete minimum 3 views  (Send-Out)            37781  Radiologic exam, Bilateral Foot; comp, 3 views  (Send-Out)              Obstructive sleep apnea (adult) (pediatric)        REFERRALS:  Referral initiated to [facility].            Orders:       REFER  Referral to Specialist or Other Facility  (Send-Out)                  Patient Recommendations:        For  Pain in unspecified ankle and joints of unspecified foot:    right ankle x-ray left ankle x-ray             Charge Capture:         Primary Diagnosis:     Z13.31  Encounter for screening for depression           Orders:      57069  Office/outpatient visit; established patient, level 4  (In-House)              Depression screen negative  (In-House)              Z79.891  Long term (current) use of opiate analgesic           Orders:      81019  Drug test prsmv read direct optical obs pr date  (In-House)              I10  Essential  (primary) hypertension     E11.8  Type 2 diabetes mellitus with unspecified complications     M54.17  Radiculopathy, lumbosacral region     R06.02  Shortness of breath     M25.579  Pain in unspecified ankle and joints of unspecified foot     G47.33  Obstructive sleep apnea (adult) (pediatric)

## 2021-05-18 NOTE — PROGRESS NOTES
Jonas Mercado 1950     Office/Outpatient Visit    Visit Date: Tue, Sep 3, 2019 02:29 pm    Provider: Yassine Corley MD (Assistant: Rachell Mcintosh MA)    Location: [facility]        Electronically signed by Yassine Corley MD on  09/08/2019 04:10:51 PM                             SUBJECTIVE:        CC:     Jonas is a 68 year old White male.  This is his first visit to the clinic.  establish care         HPI: Pt moved here from [place] June 23rd. His son lives in [place].         PHQ-9 Depression Screening: Completed form scanned and in chart; Total Score 0 Alcohol Consumption Screening: Completed form scanned and in chart; Total Score 1     BP today is 146/64 with a HR of 58. He is on lisinopril/HCTZ 20/12.5 mg, metoprolol 200 mg qd, amlodipine 5 mg qd, and clonidine 0.1 mg qHS.     Pt is on allopurinol 300 mg qd for gout. He had an episode of severe pain in left big toe several years ago, although he wasn't sure if pain was gout or plantar fascitis. He had plantar fascia release surgery 10/9/18 and this helped his left foot pain.     Pt has DM 2 and is on metfromin 500 mg BID. Last A1c was 6.9 about 9 months ago. He checks rarely and its often 110-180. He drinks soda rarely, coffee with cream (no sugar). He eats corn flakes or Cheerios.     Pt has numbness in feet bilaterally, this started several years ago and he takes gabapentin 600 qAM and at lunch. 900 mg qHS.      Pt has pain in left buttock that radiates to his left knee and left foot. Pain is electrical. Not so much lower back pain.     Pt had a saddle PE March 2013. He was admitted to [facility] in [place], [place]. He was on warfarin for 3-4 months. No source was identified.     ROS:     CONSTITUTIONAL:  Negative for fatigue and fever.      EYES:  Negative for blurred vision.      E/N/T:  Negative for diminished hearing and nasal congestion.      CARDIOVASCULAR:  Positive for pedal edema ( severe ).   Negative for chest pain  or palpitations.      RESPIRATORY:  Negative for recent cough and dyspnea.      GASTROINTESTINAL:  Negative for abdominal pain, constipation, diarrhea, nausea and vomiting.      GENITOURINARY:  Negative for dysuria.      MUSCULOSKELETAL:  Positive for arthralgias (mainly legs and feet).   Negative for myalgias.      INTEGUMENTARY:  Negative for atypical mole(s) and rash.      NEUROLOGICAL:  Positive for paresthesia ( left lower extremity ).   Negative for weakness.      PSYCHIATRIC:  Negative for anxiety, depression and sleep disturbance.          PMH/FMH/SH:     Last Reviewed on 2019 03:45 PM by Yassine Corley    Past Medical History:             PAST MEDICAL HISTORY         Type 2 Diabetes    Hypertension     Pulmonary Embolism: in ;     Gout     Peripheral Sensory Neuropathy         Surgical History:     1972      plantar fascia release 10/9/2018;         Family History:     Father:  at age 69; Cause of death was stroke;  Coronary Artery Disease     Mother:  at age 93; Cause of death was Old age;  Alzheimer's Disease         Social History:     Occupation: Retired (Prior occupation: [employer])     Marital Status:      Children: 2 children     Hobbies/Recreation: he enjoys fishing and hunting;     Exercise: Primary form of exercise is walking.          Tobacco/Alcohol/Supplements:     Last Reviewed on 2019 03:45 PM by Yassine Corley    Tobacco: He has a past history of cigarette smoking; quit date:  .          Alcohol: Frequency:    2 or 3x/year; When he drinks, the average quantity of alcohol is 1 drinks.   He typically consumes beer.      Caffeine:  He admits to consuming caffeine via coffee ( 3 servings per day ) and soda ( 2-3 servings per day ).          Substance Abuse History:     Last Reviewed on 2019 03:45 PM by Yassine Corley    None         Mental Health History:     Last Reviewed on 2019 03:45 PM by Yassine Corley    NEGATIVE          Communicable Diseases (eg STDs):     Last Reviewed on 9/03/2019 03:45 PM by Yassine Corley            Current Problems:     Last Reviewed on 9/03/2019 03:45 PM by Yassine Corley    History of pulmonary embolism     Degenerative disc disease     Peripheral neuropathy     Type 2 DM     Gout, unspecified     Benign HTN     Screening for depression         Immunizations:     None        Allergies:     Last Reviewed on 9/03/2019 03:45 PM by Yassine Corley    Aspirin (ASA):        Current Medications:     Last Reviewed on 9/03/2019 03:45 PM by Yassine Corley    Allopurinol 300mg Tablet 1 tab daily     Amlodipine  5mg Tablet 1 tab daily     Clonidine HCl 0.1mg Tablet take 1 tab po q hs     Gabapentin 300mg Capsules 2 in am, 2 at noon, 3 hs     Lisinopril/Hydrochlorothiazide 20mg/12.5mg Tablet 1 bid     Metformin HCl 500mg Tablets, Extended Release 2 po BID     Toprol XL 200mg Tablets, Extended Release Take 1 tablet(s) by mouth daily     Acetaminophen/Codeine 300mg/30mg Tablet         OBJECTIVE:        Vitals:         Current: 9/3/2019 2:40:12 PM    Ht:  5 ft, 10.5 in;  Wt: 288.6 lbs;  BMI: 40.8    T: 97.8 F (oral);  BP: 146/64 mm Hg (left arm, sitting);  P: 58 bpm (left arm (BP Cuff), sitting)        Exams:     PHYSICAL EXAM:     GENERAL: Vitals recorded well developed, well nourished,  morbidly obese;  well groomed;  no apparent distress;     EYES: extraocular movements intact; conjunctiva and cornea are normal; PERRLA;     E/N/T: OROPHARYNX:  normal mucosa, dentition, gingiva, and posterior pharynx;     NECK: range of motion is normal; thyroid exam reveals not enlarged;     RESPIRATORY: normal respiratory rate and pattern with no distress; normal breath sounds with no rales, rhonchi, wheezes or rubs;     CARDIOVASCULAR: normal rate; rhythm is regular;  no systolic murmur; no edema;     GASTROINTESTINAL: nontender; normal bowel sounds;     LYMPHATIC: no enlargement of cervical or facial nodes;     SKIN: right 2nd  toe ulcer;     MUSCULOSKELETAL: normal gait; normal overall tone     NEUROLOGIC: mental status: alert and oriented x 3; reflexes: knee jerks: 2+;     PSYCHIATRIC:  appropriate affect and demeanor; normal speech pattern; grossly normal memory;         ASSESSMENT           V79.0   Z13.31  Screening for depression              DDx:     401.1   I10  Benign HTN              DDx:     274.9   M10.9  Gout, unspecified              DDx:     250.00   E11.8  Type 2 DM              DDx:     356.9   Z79.891  Peripheral neuropathy              DDx:     722.6   M51.37  Degenerative disc disease              DDx:     V12.51   Z86.711  History of pulmonary embolism              DDx:         ORDERS:         Meds Prescribed:       Refill of: Metformin HCl 500mg Tablets, Extended Release 2 po BID  #180 (One [place] [address]) tablet(s) Refills: 2       Refill of: Gabapentin 600mg Tablet 1 po tid  #90 (Ninety) tablet(s) Refills: 0         Radiology/Test Orders:       79082  Radiologic examination, spine, lumbosacral;  minimum of four views  (Send-Out)         90775  Radiologic examination, sacrum and coccyx; minimum of two views  (Send-Out)           Lab Orders:       FUTURE  Future order to be done at patients convenience  (Send-Out)         67633  BDCBC [facility] CBC with 3 part diff  (Send-Out)         44541  DIAB2 - [facility] CMP A1C LIPID AND MICRO ALBUM CR RATIO: [id number]  (Send-Out)         41395  TSH - [facility] TSH  (Send-Out)         FUTURE  Future order to be done at patients convenience  (Send-Out)         20957  RAPII [facility] Arthritis Profile  (Send-Out)         FUTURE  Future order to be done at patients convenience  (Send-Out)         52177  PRO-T - [facility] Prothrombin Time PT/INR  (Send-Out)         APPTO  Appointment need  (In-House)           Other Orders:         Depression screen negative  (In-House)                   PLAN: Will ask if short of breath at next visit. Pt may benefit from ECHO especially given h/o  PE and edema.          Screening for depression     MIPS PHQ-9 Depression Screening: Completed form scanned and in chart; Total Score 0; Negative Depression Screen           Orders:         Depression screen negative  (In-House)            Benign HTN BP slightly elevated today despite several medications. For now, cont lisinopril/HCTZ 20/12.5 mg, metoprolol 200 mg qd, amlodipine 5 mg qd, and clonidine 0.1 mg qHS. Consider increasing lisinopril and stopping clonidine.         FOLLOW-UP: Schedule a follow-up visit in 1 month..      FOLLOW-UP TESTING #1: FOLLOW-UP LABORATORY:  Labs to be scheduled in the future include CBC, Diabetes Panel 2;CMP, A1C, Lipid, Microalbumin:Creatinine Ratio, and TSH.            Orders:       FUTURE  Future order to be done at patients convenience  (Send-Out)         64303  [facility] CBC with 3 part diff  (Send-Out)         48541  DIAB[address] [place] [address] CMP A1C LIPID AND MICRO ALBUM CR RATIO: [id number]  (Send-Out)         17617  [facility] TSH  (Send-Out)         APPTO  Appointment need  (In-House)            Gout, unspecified Cont allopurinol, will check uric acid level.         FOLLOW-UP TESTING #1: FOLLOW-UP LABORATORY:  Labs to be scheduled in the future include Arthritis Panel.   Patient to schedule for tomorrow.            Orders:       FUTURE  Future order to be done at patients convenience  (Send-Out)         40439  [facility] Ar[facility]  (Send-Out)            Type 2 DM DM 2 is well controlled per history. Cont metformin 500 mg BID and pt advised to eat a more low carb diet, including something other than cereal for breakfast, yogurt, oatmeal, or eggs would be a better alternative. A1c ordered with HTN Labs.           Prescriptions:       Refill of: Metformin HCl 500mg Tablets, Extended Release 2 po BID  #180 (One [place] [address]) tablet(s) Refills: 2          Peripheral neuropathy Gabapentin changed to 600 mg TID to simplify regimen for diabetic peripheral  neuropathy.           Prescriptions:       Refill of: Gabapentin 600mg Tablet 1 po tid  #90 (Ninety) tablet(s) Refills: 0          Degenerative disc disease         RADIOLOGY:  I have ordered Lumbar/Sacral Spine X-ray to be done today.            Orders:       30438  Radiologic examination, spine, lumbosacral;  minimum of four views  (Send-Out)         12107  Radiologic examination, sacrum and coccyx; minimum of two views  (Send-Out)            History of pulmonary embolism PT/INBR added to basic labs. Consider additional labs or referral to heme/onc if needed.         FOLLOW-UP TESTING #1: FOLLOW-UP LABORATORY:  Labs to be scheduled in the future include PTINR.            Orders:       FUTURE  Future order to be done at patients convenience  (Send-Out)         28066  PRO-T - [facility] Prothrombin Time PT/INR  (Send-Out)               Patient Recommendations:        For  Benign HTN:     Schedule a follow-up visit in 1 month.                APPOINTMENT INFORMATION:        Monday Tuesday Wednesday Thursday Friday Saturday Sunday            Time:___________________AM  PM   Date:_____________________         The following laboratory testing has been ordered: CBC TSH         For  Gout, unspecified:             The following laboratory testing has been ordered:                 Schedule the above testing tomorrow.          For  History of pulmonary embolism:             The following laboratory testing has been ordered: PTINR             CHARGE CAPTURE           **Please note: ICD descriptions below are intended for billing purposes only and may not represent clinical diagnoses**        Primary Diagnosis:         V79.0 Screening for depression            Z13.31    Encounter for screening for depression              Orders:          26330   Office visit - new pt, level 3  (In-House)                Depression screen negative  (In-House)           401.1 Benign HTN            I10    Essential (primary) hypertension               Orders:          APPTO   Appointment need  (In-House)           274.9 Gout, unspecified            M10.9    Gout, unspecified    250.00 Type 2 DM            E11.8    Type 2 diabetes mellitus with unspecified complications    356.9 Peripheral neuropathy            Z79.891    Long term (current) use of opiate analgesic    722.6 Degenerative disc disease            M51.37    Other intervertebral disc degeneration, lumbosacral region    V12.51 History of pulmonary embolism            Z86.711    Personal history of pulmonary embolism

## 2021-05-18 NOTE — PROGRESS NOTES
Jonas Mercado Gene  1950     Office/Outpatient Visit    Visit Date: Tue, Feb 2, 2021 04:29 pm    Provider: Chava Desouza MD (Assistant: Mimi Brooks MA)    Location: [facility]        Electronically signed by hCava Desouza MD on  02/02/2021 07:18:45 PM                             Subjective:        CC: Jonas is a 70 year old White male.  This is a follow-up visit.  1 month         HPI:           Patient presents with type 2 diabetes mellitus with unspecified complications.  Specifically, this is type 2, non-insulin requiring diabetes without complications.  Compliance with treatment has been good; he takes his medication as directed and follows up as directed.  He denies experiencing any diabetes related symptoms.  Current meds include an oral hypoglycemic ( Glucophage ( 1000mg bid ) and Glucotrol ( 10 mg AM/5 mg PM ) ).  Most recent lab results include Hemoglobin A1c:  7.5 (%) (12/23/2020).  Concurrent health problems include hypertension.            Essential (primary) hypertension details; his current cardiac medication regimen includes a diuretic ( HCTZ 25 mg QD ), a beta-blocker ( TOprol  mg daily ), an ACE inhibitor ( Lisinopril 40 mg QD ), and a calcium channel blocker ( amlodipine 10 mg daily ).  Compliance with treatment has been good; he takes his medication as directed and follows up as directed.  He is tolerating the medication well without side effects.  He has not kept a blood pressure diary, but states that pressures have been too high.        Pertaining to gout, Jonas reports that he can't recall when his last flare was. all His current regimen includes allopurinol 300 mg daily.      Pertaining to chronic pain, Jonas has a longstanding history of low back pain and bilateral  (L>R) foot pain.  In June, he underwent an L4 S5 fusion and decompression.  His back pain seems to be improving postoperatively.  However, his foot pain, especially his left foot, has  continued to give him issues.  He endorses significant pain over his Achilles tendon and on the lateral side of his foot.  He is seen both podiatry and foot and ankle specialist for this pain and no one has been able to give him an exact cause.  At last visit, none an inflammatory arthritis panel was unremarkable.  Also at last visit, his Lyrica was increased to 100 mg twice daily.  He says he is found this somewhat helpful but his pain still persists.  He also takes Tylenol 3 up to 3 times per day for his pain.  He says that he is tired of being in pain and would like to have 1 day where he feels like he does not need to take medication.    ROS:     CONSTITUTIONAL:  Negative for fatigue and fever.      EYES:  Negative for blurred vision.      E/N/T:  Negative for diminished hearing and nasal congestion.      CARDIOVASCULAR:  Negative for chest pain, palpitations and pedal edema.      RESPIRATORY:  Negative for recent cough and dyspnea.      GASTROINTESTINAL:  Negative for abdominal pain, constipation, diarrhea, nausea and vomiting.      MUSCULOSKELETAL:  Positive for arthralgias, (plantar fascitis of left foot) back pain and limb pain ( right leg pain ).   Negative for myalgias.      INTEGUMENTARY:  Negative for atypical mole(s) and rash.      NEUROLOGICAL:  Positive for paresthesia ( left lower extremity ).   Negative for weakness.      PSYCHIATRIC:  Negative for anxiety, depression and sleep disturbance.          Past Medical History / Family History / Social History:         Last Reviewed on 2/02/2021 07:18 PM by Chava Desouza    Past Medical History:             PAST MEDICAL HISTORY         Type 2 Diabetes    Hypertension     Pulmonary Embolism: in 2013;     Gout     Peripheral Sensory Neuropathy     Hospitalizations: [facility], admitted once on 2013         PREVENTIVE HEALTH MAINTENANCE             COLORECTAL CANCER SCREENING:; colonoscopy with normal results; 2013         Surgical History:     1972      plantar fascia release 10/9/2018;         Family History:     Father:  at age 69; Cause of death was stroke;  Coronary Artery Disease     Mother:  at age 93; Cause of death was Old age;  Alzheimer's Disease         Social History:     Occupation: Retired (Prior occupation: [employer])     Marital Status:      Children: 2 children     Hobbies/Recreation: he enjoys fishing and hunting;     Exercise: Primary form of exercise is walking.          Tobacco/Alcohol/Supplements:     Last Reviewed on 2021 07:18 PM by Chava Desouza    Tobacco: He has a past history of cigarette smoking; quit date:  .          Alcohol: Frequency:    2 or 3x/year; When he drinks, the average quantity of alcohol is 1 drinks.   He typically consumes beer.      Caffeine:  He admits to consuming caffeine via coffee ( 3 servings per day ) and soda ( 2-3 servings per day ).          Substance Abuse History:     Last Reviewed on 2021 07:18 PM by Chava Desouza    None         Mental Health History:     Last Reviewed on 2021 07:18 PM by Chava Desouza    NEGATIVE         Communicable Diseases (eg STDs):     Last Reviewed on 2021 07:18 PM by Chava Desouza        Current Problems:     Last Reviewed on 2021 07:18 PM by Chava Desouza    Type 2 diabetes mellitus with unspecified complications    Essential (primary) hypertension    Gout, unspecified    Radiculopathy, lumbosacral region    Long term (current) use of opiate analgesic    Personal history of pulmonary embolism    Other specified disorders involving the immune mechanism, not elsewhere classified    Non-pressure chronic ulcer of other part of right foot limited to breakdown of skin    Other long term (current) drug therapy    Personal history of diabetic foot ulcer    Encounter for other preprocedural examination    Encounter for screening for depression    Encounter for screening for cardiovascular disorders    Encounter for screening for malignant  neoplasm of prostate    Encounter for general adult medical examination without abnormal findings    Shortness of breath    Obstructive sleep apnea (adult) (pediatric)    Pain in unspecified ankle and joints of unspecified foot    Encounter for immunization    Plantar fascial fibromatosis    Pain in unspecified joint        Immunizations:     influenza, high-dose, quadrivalent (FLUZONE HIGH-DOSE QUAD 2020-21) 9/30/2020    Fluzone Quadrivalent (3+ years) 10/15/2019        Allergies:     Last Reviewed on 2/02/2021 07:18 PM by Chava Desouza    Aspirin (ASA):          Current Medications:     Last Reviewed on 2/02/2021 07:18 PM by Chava Desouza    Toprol  mg oral Tablet, Extended Release 24 hr [Take 1 tablet(s) by mouth daily]    allopurinoL 300 mg oral tablet [TAKE 1 TABLET DAILY]    acetaminophen-codeine 300-30 mg oral tablet [1 p.o. TID PRN ]    amLODIPine 10 mg oral tablet [take 1 tablet (10 mg) by oral route once daily]    metFORMIN 1,000 mg oral tablet [take 1 tablet (1,000 mg) by oral route 2 times per day with morning and evening meals]    lisinopriL 40 mg oral tablet [take 1 tablet (40 mg) daily]    glipiZIDE 5 mg oral tablet [TAKE 2 TABLETS BY MOUTH BEFORE BREAKFAST AND 1 TABLET BY MOUTH WITH EVENING MEAL]    pregabalin 100 mg oral capsule [take 1 capsule (100 mg) by oral route 2 times per day]    atorvastatin 20 mg oral tablet [TAKE 1 TABLET DAILY]    lancets 30 gauge and blood glucose strips combo pack  [Use as directed BID to check blood sugar]    DICLOFENAC SODIUM 1% GEL     hydroCHLOROthiazide 25 mg oral tablet [take 1 tablet (25 mg) by oral route once a day ]        Objective:        Vitals:         Current: 2/2/2021 4:38:20 PM    Ht:  5 ft, 10.5 in;  Wt: 307 lbs;  BMI: 43.4T: 96.2 F (temporal);  BP: 164/63 mm Hg (right arm, sitting);  P: 60 bpm (right arm (BP Cuff), sitting);  sCr: 1.19 mg/dL;  GFR: 70.92        Repeat:     7:10:41 PM  BP:   144/60mm Hg    Exams:     PHYSICAL EXAM:     GENERAL:  Vitals recorded well developed, well nourished,  morbidly obese;  well groomed;  no apparent distress;     EYES: extraocular movements intact; conjunctiva and cornea are normal; PERRLA;     NECK: range of motion is normal; trachea is midline;     RESPIRATORY: normal respiratory rate and pattern with no distress; normal breath sounds with no rales, rhonchi, wheezes or rubs;     CARDIOVASCULAR: normal rate; rhythm is regular;  no systolic murmur; 1+ pedal edema;     NEUROLOGIC: mental status: alert and oriented x 3; GROSSLY INTACT     PSYCHIATRIC:  appropriate affect and demeanor; normal speech pattern; grossly normal memory;         Assessment:         E11.8   Type 2 diabetes mellitus with unspecified complications       I10   Essential (primary) hypertension       M10.9   Gout, unspecified       M54.17   Radiculopathy, lumbosacral region       M25.579   Pain in unspecified ankle and joints of unspecified foot           Plan:         Type 2 diabetes mellitus with unspecified complications- Not quite at goal.  Continue metformin 1000 twice daily and glipizide 10 mg AM/5 mg PM.        Essential (primary) hypertension- Borderline/mildly elevated. Patient declines changes today.  Continue amlodipine 10 mg daily, isinopril  40 mg daily, Toprol- mg daily and HCTZ 25 mg daily.        Gout, unspecified- Stable.  Continue allopurinol 300 mg daily..        Radiculopathy, lumbosacral region- Chronic/stable.  Continue Lyrica 100 mg twice daily and Tylenol threes as needed.        Pain in unspecified ankle and joints of unspecified foot- see above            Charge Capture:         Primary Diagnosis:     E11.8  Type 2 diabetes mellitus with unspecified complications           Orders:      77373  Office/outpatient visit; established patient, level 4  (In-House)              I10  Essential (primary) hypertension     M10.9  Gout, unspecified     M54.17  Radiculopathy, lumbosacral region     M25.579  Pain in unspecified ankle  and joints of unspecified foot

## 2021-05-18 NOTE — PROGRESS NOTES
Jonas Mercado Gene  1950     Office/Outpatient Visit    Visit Date: Mon, May 18, 2020 01:11 pm    Provider: Yassine Corley MD (Assistant: Tiffany Oakes, )    Location: [facility]        Electronically signed by Yassine Corley MD on  05/18/2020 08:41:59 PM                             Subjective:        CC: Jonas is a 69 year old White male.  Medicare Wellness-- [facility] [phone]         HPI:           Jonas is here for a Medicare wellness visit.  The required HRA questions are integrated within this visit note. Family medical history and individual medical/surgical history were reviewed and updated.          Self-Assessment of Health: He rates his health as good. He rates his confidence of being able to control/manage most of his health problems as somewhat confident. His physical/emotional health has limited his social activites slightly.  A review of cognitive impairment was performed, including ability to drive a car, manage finances, and any memory changes, and was found to be negative.  A review of functional ability, including bathing, dressing, walking, and urine/bowel continence as well as level of safety was performed and was found to be negative.  Falls Risk: Has not had any falls or only one fall without injury in the past year.  He denies having trouble hearing the TV/radio when others do not, having to strain to hear or understand conversations and wearing hearing aid(s).  Concerning home safety, he reports that at home he DOES have adequate lighting, a skid resistant shower/tub, functioning smoke alarms and absence of throw rugs, but not grab bars in the bath.          Immunization Status: Up to date; Physical Activity: He exercises but less than 20 minutes 3 days per week; Type of diet patient normally eats is described as diabetic diet; poor--needs improvement.  Tobacco: He has a past history of cigarette smoking; quit date:  02/1998.  Preventative Health updated  today.            PHQ-9 Depression Screening: Completed form scanned and in chart; Total Score 0       Pt checks BP a few times a week and its typically 130s/60-70 with a HR of 60-72. He is on lisinopril/HCTZ 20/12.5 mg 2 tab qd, metoprolol 200 mg qd, and amlodipine 5 mg.      A1c increased from 7.1 to 7.4 on 2/10/20. He is on metfromin ER 1,000 mg BID and glipizide 5 mg BID. He checks glucose once a day and its typically about 110 - 150. Diet is OK. Exercise is limited by pain. pt saw diabetic educator on 3/5/20.      Pt saw Dr. Holley on 1/29/20. Pt will proceed with anterior/posterior fusion and decompression from L4-S1. He has L5 radiculopathy. MRI Lumbar spine severe foraminal stenosis and sponylolisthesis at L5-S1, L4-L5 synovial cyst and multilevel DDD. Surgery was tentatively scheduled for April 13th but this was delayed to June 18th due to COVID 19 pandemic. Pt is on lyrica 100 mg qd and norco 5 mg BID.    ROS:     CONSTITUTIONAL:  Negative for fatigue and fever.      EYES:  Negative for blurred vision.      E/N/T:  Negative for diminished hearing and nasal congestion.      CARDIOVASCULAR:  Positive for pedal edema ( mild ).   Negative for chest pain or palpitations.      RESPIRATORY:  Negative for recent cough and dyspnea.      GASTROINTESTINAL:  Negative for abdominal pain, constipation, diarrhea, nausea and vomiting.      MUSCULOSKELETAL:  Positive for arthralgias, (plantar fascitis of left foot) back pain and limb pain ( right leg pain ).   Negative for myalgias.      INTEGUMENTARY:  Negative for atypical mole(s) and rash.      NEUROLOGICAL:  Positive for paresthesia ( left lower extremity ).   Negative for weakness.      PSYCHIATRIC:  Negative for anxiety, depression and sleep disturbance.          Past Medical History / Family History / Social History:         Last Reviewed on 5/18/2020 08:18 PM by Yassine Corley    Past Medical History:             PAST MEDICAL HISTORY         Type 2  Diabetes    Hypertension     Pulmonary Embolism: in 2013;     Gout     Peripheral Sensory Neuropathy     Hospitalizations: [facility], admitted once on          PREVENTIVE HEALTH MAINTENANCE             COLORECTAL CANCER SCREENING:; colonoscopy with normal results;          Surgical History:     1972     plantar fascia release 10/9/2018;         Family History:     Father:  at age 69; Cause of death was stroke;  Coronary Artery Disease     Mother:  at age 93; Cause of death was Old age;  Alzheimer's Disease         Social History:     Occupation: Retired (Prior occupation: [employer])     Marital Status:      Children: 2 children     Hobbies/Recreation: he enjoys fishing and hunting;     Exercise: Primary form of exercise is walking.          Tobacco/Alcohol/Supplements:     Last Reviewed on 2020 08:18 PM by Yassine Corley    Tobacco: He has a past history of cigarette smoking; quit date:  .          Alcohol: Frequency:    2 or 3x/year; When he drinks, the average quantity of alcohol is 1 drinks.   He typically consumes beer.      Caffeine:  He admits to consuming caffeine via coffee ( 3 servings per day ) and soda ( 2-3 servings per day ).          Substance Abuse History:     Last Reviewed on 2020 08:18 PM by Yassine Corley    None         Mental Health History:     Last Reviewed on 2020 08:18 PM by Yassine Corley    NEGATIVE         Communicable Diseases (eg STDs):     Last Reviewed on 2020 08:18 PM by Yassine Corley        Current Problems:     Last Reviewed on 2020 08:18 PM by Yassine Corley    Type 2 diabetes mellitus with unspecified complications    Essential (primary) hypertension    Gout, unspecified    Radiculopathy, lumbosacral region    Long term (current) use of opiate analgesic    Personal history of pulmonary embolism    Other specified disorders involving the immune mechanism, not elsewhere classified    Non-pressure chronic ulcer of  other part of right foot limited to breakdown of skin    Other long term (current) drug therapy    Personal history of diabetic foot ulcer    Encounter for other preprocedural examination    Encounter for screening for depression    Encounter for general adult medical examination without abnormal findings        Immunizations:     Fluzone Quadrivalent (3+ years) 10/15/2019        Allergies:     Last Reviewed on 5/18/2020 08:18 PM by Yassine Corley    Aspirin (ASA):          Current Medications:     Last Reviewed on 5/18/2020 08:18 PM by Yassine Corley    lisinopril-hydrochlorothiazide 20-12.5 mg oral tablet [Take 2 tablet(s) by mouth daily]    metFORMIN 500 mg oral Tablet, Extended Release 24 hr [TAKE 2 TABLETS TWICE A DAY]    amLODIPine 5 mg oral tablet [1 tab daily]    Toprol  mg oral Tablet, Extended Release 24 hr [Take 1 tablet(s) by mouth daily]    acetaminophen-codeine 300-30 mg oral tablet [1 p.o. TID prn pain]    allopurinoL 300 mg oral tablet [TAKE 1 TABLET DAILY]    glipiZIDE 5 mg oral tablet [TAKE 1 TABLET BY MOUTH BEFORE BREAKFAST AND EVENING MEAL]    Lyrica 100 mg oral capsule [take 1 capsule (100 mg) by oral route once per day]    atorvastatin 20 mg oral tablet [take 1 tablet (20 mg) by oral route once daily]    Norco 5-325 mg oral tablet [take 1 tablet by oral route every 12 hours as needed for pain]        Objective:        Exams:     PHYSICAL EXAM:     GENERAL: Vitals recorded well developed, well nourished,  morbidly obese;  well groomed;  no apparent distress, seems to be in moderate pain;     EYES: extraocular movements intact;     NECK: range of motion is normal;     RESPIRATORY: normal respiratory rate and pattern with no distress;     CARDIOVASCULAR: no cyanosis;     NEUROLOGIC: mental status: alert and oriented x 3; GROSSLY INTACT     PSYCHIATRIC:  appropriate affect and demeanor; normal speech pattern; grossly normal memory;         Assessment:         Z00.00   Encounter for  general adult medical examination without abnormal findings       Z13.31   Encounter for screening for depression       I10   Essential (primary) hypertension       E11.8   Type 2 diabetes mellitus with unspecified complications       M54.17   Radiculopathy, lumbosacral region       Z12.5   Encounter for screening for malignant neoplasm of prostate       Z13.6   Encounter for screening for cardiovascular disorders           ORDERS:         Meds Prescribed:       [Refilled] metFORMIN 1,000 mg oral tablet [take 1 tablet (1,000 mg) by oral route 2 times per day with morning and evening meals], #180 (one hundred and eighty) tablets, Refills: 2 (two)       [Refilled] lisinopril-hydrochlorothiazide 20-12.5 mg oral tablet [Take 2 tablet(s) by mouth daily], #180 (one hundred and eighty) tablets, Refills: 3 (three)       [Queued Refill] glipiZIDE 5 mg oral tablet [TAKE 1 TABLET BY MOUTH BEFORE BREAKFAST AND EVENING MEAL], #180 (one hundred and eighty) tablets, Refills: 3 (three)       [New Rx] lancets 30 gauge and blood glucose strips combo pack [Use as directed BID to check blood sugar], #100 (one hundred) each, Refills: 0 (zero)         Radiology/Test Orders:       86103  US abdominal aorta real time screen study AAA  (Send-Out)              Lab Orders:       FUTURE  Future order to be done at patients convenience  (Send-Out)            59178  BDCBC - [facility] CBC with 3 part diff  (Send-Out)            16508  DIAB[address] [place] [address] CMP A1C LIPID AND MICRO ALBUM CR RATIO: [id number]  (Send-Out)            85995  TSH - [facility] TSH  (Send-Out)            FUTURE  Future order to be done at patients convenience  (Send-Out)            *  PRSAS Medicare screening PSA  (Send-Out)              Other Orders:       1101F  Pt screen for fall risk; document no falls in past year or only 1 fall w/o injury in past year (RIVAS)  (In-House)              Depression screen negative  (In-House)                      Plan:          Encounter for general adult medical examination without abnormal findingsWe discussed nutrition and pt advised to eat a low sugar, non-processed diet. We discussed physical activity and healthy weight and pt is advised to exercise 150 minutes per week in addition to his activity at work. Pt does not smoke, does not use tobacco products, and does not use illicit drugs. Pt is advised to limit alcohol to 2 drinks per day at most. He is advised to stay up to date with dental health and immunizations. He is UTD on screenings.     MIPS Has had no falls or only one fall without injury in the past year Vaccines Flu and Pneumonia updated in Shot record           Orders:       1101F  Pt screen for fall risk; document no falls in past year or only 1 fall w/o injury in past year (RIVAS)  (In-House)              Encounter for screening for depression    MIPS PHQ-9 Depression Screening: Completed form scanned and in chart; Total Score 0; Negative Depression Screen           Orders:         Depression screen negative  (In-House)              Essential (primary) hypertensionBP is well controlled, cont lisinopril/HCTZ 20/12.5 mg 2 tab qd, metoprolol 200 mg qd, and amlodipine 5 mg.          Prescriptions:       [Refilled] lisinopril-hydrochlorothiazide 20-12.5 mg oral tablet [Take 2 tablet(s) by mouth daily], #180 (one hundred and eighty) tablets, Refills: 3 (three)         Type 2 diabetes mellitus with unspecified complicationsWill recheck labs this week and adjust medications as needed.         FOLLOW-UP TESTING #1: FOLLOW-UP LABORATORY:  Labs to be scheduled in the future include CBC, Diabetes Panel 2;CMP, A1C, Lipid, Microalbumin:Creatinine Ratio, and TSH.   Patient to schedule to be performed in 2 weeks.            Prescriptions:       [Refilled] metFORMIN 1,000 mg oral tablet [take 1 tablet (1,000 mg) by oral route 2 times per day with morning and evening meals], #180 (one hundred and eighty) tablets, Refills: 2 (two)        [Queued Refill] glipiZIDE 5 mg oral tablet [TAKE 1 TABLET BY MOUTH BEFORE BREAKFAST AND EVENING MEAL], #180 (one hundred and eighty) tablets, Refills: 3 (three)       [New Rx] lancets 30 gauge and blood glucose strips combo pack [Use as directed BID to check blood sugar], #100 (one hundred) each, Refills: 0 (zero)           Orders:       FUTURE  Future order to be done at patients convenience  (Send-Out)            37870  BDCBC - [facility] CBC with 3 part diff  (Send-Out)            86782  DIAB2 - [facility] CMP A1C LIPID AND MICRO ALBUM CR RATIO: [id number]  (Send-Out)            85694  TSH - [facility] TSH  (Send-Out)              Radiculopathy, lumbosacral regionCont lyrica 100 mg qd, norco 5 mg BID, and tylenol #3 TID until pt has surgery on 6/18/20.     Controlled substance documentation: Luis Manuel reviewed; prior drug screen consistent; consent is reviewed and signed and on the chart.  He is aware of risk of addiction on this medication, understands that he will need to follow up for a review every 3 months and his medications will be adjusted or decreased as deemed appropriate at each visit.  No history of drug or alcohol abuse.  No concerns about diversion or abuse. He denies side effects related to the medication.  He is aware that he may be called in for pill counts.  The dosing of this medication will be reviewed on a regular basis and reduced if possible..  Ongoing use of a controlled substance is necessary for this patient to have a normal quality of life         Encounter for screening for malignant neoplasm of prostate        FOLLOW-UP TESTING #1: FOLLOW-UP LABORATORY:  Labs to be scheduled in the future include PSA Screening Medicare patients.            Orders:       FUTURE  Future order to be done at patients convenience  (Send-Out)            *  PRSAS Medicare screening PSA  (Send-Out)              Encounter for screening for cardiovascular disorders        FOLLOW-UP TESTING #1:    RADIOLOGY:  I  have ordered Aorta Screening Ultrasound Medicare to be done today. Patient to schedule in 2 months.            Orders:       79716  US abdominal aorta real time screen study AAA  (Send-Out)                  Patient Recommendations:        For  Type 2 diabetes mellitus with unspecified complications:            The following laboratory testing has been ordered: CBC TSH Schedule the above testing in 2 weeks.          For  Encounter for screening for malignant neoplasm of prostate:            The following laboratory testing has been ordered:         For  Encounter for screening for cardiovascular disorders:    Schedule the above testing in 2 months.              Charge Capture:         Primary Diagnosis:     Z00.00  Encounter for general adult medical examination without abnormal findings           Orders:      93028  Preventive medicine, established patient, age 65+ years  (In-House)            1101F  Pt screen for fall risk; document no falls in past year or only 1 fall w/o injury in past year (RIVAS)  (In-House)              Z13.31  Encounter for screening for depression           Orders:      32073-62  Office/outpatient visit; established patient, level 4  (In-House)              Depression screen negative  (In-House)              I10  Essential (primary) hypertension     E11.8  Type 2 diabetes mellitus with unspecified complications     M54.17  Radiculopathy, lumbosacral region     Z12.5  Encounter for screening for malignant neoplasm of prostate     Z13.6  Encounter for screening for cardiovascular disorders         ADDENDUMS:      ____________________________________    Addendum: 05/20/2020 01:00 PM - Yassine Corley        Addendum: Telehealth: Verbal consent obtained for visit to occur via televideo conferencing; Staff, other than provider, present during telephone visit include Tiffany Oakes        Addendum: 07/16/2020 09:14 AM - Six, Team         Visit Note Faxed to:        User Entered Recipient;  Number [phone]

## 2021-05-18 NOTE — PROGRESS NOTES
Jonas Mercado Gene  1950     Office/Outpatient Visit    Visit Date: Wed, Oct 14, 2020 12:57 pm    Provider: Yassine Corley MD (Assistant: Tiffany Oakes, )    Location: [facility]        Electronically signed by Yassine Corley MD on  10/14/2020 07:25:10 PM                             Subjective:        CC: Jonas is a 69 year old White male.  This is a follow-up visit.          HPI:       BP today is 150/61 with a HR of 64; recheck is 147/64 with a HR of 54. He does not check BP at home often. He is on lisinopril/HCTZ 20/12.5 mg 2 tab qd, metoprolol 200 mg qd, and amlodipine 5 mg.      A1c improved from 7.5 to 6.9 on 9/2/20. He is on metfromin ER 1,000 mg BID and glipizide 5 mg BID. He checks glucose once a day and its typically about 110 - 140. Diet is OK. Exercise is limited by pain. pt saw diabetic educator on 3/5/20.      Pt continues to progress from anterior/posterior fusion and decompression from L4-S1 on June 16th. He is now walking a little over 1/2 mile slowly, mild pain and soreness. Radiculopathy has resolved. He saw Dr. Holley on 8/28 and he indicated all was well. He has completed PT. He is on lyrica 75 mg BID (more for foot pain) and tylenol #3 (more for ankles).       Pt saw Dr. Gonzalez for bilateral plantar fascitits, worse on left. Pt offered steroid injection but opts for voltaren gel instead. It helps a little. He says steroid shots only help for a couple weeks. Pt was previously going to [facility] back in December for non-healing diabetic foot ulcer, he had surgery in January and this went well. He had a steroid shot in left foot for plantar fascitis in June. But since then he has been unable to get ahold of them and the clinic seems to be closed. He continues to have pain and swelling in both ankles and plantar fascitis in left foot. This is better with tylenol #3 and lyrica 75 mg qd.    ROS:     CONSTITUTIONAL:  Negative for fatigue and fever.       EYES:  Negative for blurred vision.      E/N/T:  Negative for diminished hearing and nasal congestion.      CARDIOVASCULAR:  Positive for pedal edema ( moderate; severe ).   Negative for chest pain or palpitations.      RESPIRATORY:  Positive for dyspnea ( with moderate exertion ).   Negative for recent cough.      GASTROINTESTINAL:  Negative for abdominal pain, constipation, diarrhea, nausea and vomiting.      MUSCULOSKELETAL:  Positive for arthralgias, (plantar fascitis of left foot) back pain and limb pain ( right leg pain ).   Negative for myalgias.      INTEGUMENTARY:  Negative for atypical mole(s) and rash.      NEUROLOGICAL:  Positive for paresthesia ( left lower extremity ).   Negative for weakness.      PSYCHIATRIC:  Negative for anxiety, depression and sleep disturbance.          Past Medical History / Family History / Social History:         Last Reviewed on 10/14/2020 01:32 PM by Yassine Corley    Past Medical History:             PAST MEDICAL HISTORY         Type 2 Diabetes    Hypertension     Pulmonary Embolism: in ;     Gout     Peripheral Sensory Neuropathy     Hospitalizations: [facility], admitted once on          PREVENTIVE HEALTH MAINTENANCE             COLORECTAL CANCER SCREENING:; colonoscopy with normal results;          Surgical History:     1972     plantar fascia release 10/9/2018;         Family History:     Father:  at age 69; Cause of death was stroke;  Coronary Artery Disease     Mother:  at age 93; Cause of death was Old age;  Alzheimer's Disease         Social History:     Occupation: Retired (Prior occupation: [employer])     Marital Status:      Children: 2 children     Hobbies/Recreation: he enjoys fishing and hunting;     Exercise: Primary form of exercise is walking.          Tobacco/Alcohol/Supplements:     Last Reviewed on 10/14/2020 01:32 PM by Yassine Corley    Tobacco: He has a past history of cigarette smoking; quit date:  .           Alcohol: Frequency:    2 or 3x/year; When he drinks, the average quantity of alcohol is 1 drinks.   He typically consumes beer.      Caffeine:  He admits to consuming caffeine via coffee ( 3 servings per day ) and soda ( 2-3 servings per day ).          Substance Abuse History:     Last Reviewed on 10/14/2020 01:32 PM by Yassine Corley    None         Mental Health History:     Last Reviewed on 10/14/2020 01:32 PM by Yassine Corley    NEGATIVE         Communicable Diseases (eg STDs):     Last Reviewed on 10/14/2020 01:32 PM by Yassine Corley        Current Problems:     Last Reviewed on 10/14/2020 01:32 PM by Yassine Corley    Type 2 diabetes mellitus with unspecified complications    Essential (primary) hypertension    Gout, unspecified    Radiculopathy, lumbosacral region    Long term (current) use of opiate analgesic    Personal history of pulmonary embolism    Other specified disorders involving the immune mechanism, not elsewhere classified    Non-pressure chronic ulcer of other part of right foot limited to breakdown of skin    Other long term (current) drug therapy    Personal history of diabetic foot ulcer    Encounter for other preprocedural examination    Encounter for screening for depression    Encounter for screening for cardiovascular disorders    Encounter for screening for malignant neoplasm of prostate    Encounter for general adult medical examination without abnormal findings    Shortness of breath    Obstructive sleep apnea (adult) (pediatric)    Pain in unspecified ankle and joints of unspecified foot    Encounter for immunization    Plantar fascial fibromatosis        Immunizations:     influenza, high-dose, quadrivalent (FLUZONE HIGH-DOSE QUAD 2020-21) 9/30/2020    Fluzone Quadrivalent (3+ years) 10/15/2019        Allergies:     Last Reviewed on 10/14/2020 01:32 PM by Yassine Corley    Aspirin (ASA):          Current Medications:     Last Reviewed on 10/14/2020 01:32 PM by Jory  Yassine TRINIDAD    amLODIPine 5 mg oral tablet [1 tab daily]    Toprol  mg oral Tablet, Extended Release 24 hr [Take 1 tablet(s) by mouth daily]    metFORMIN 1,000 mg oral tablet [take 1 tablet (1,000 mg) by oral route 2 times per day with morning and evening meals]    lisinopril-hydrochlorothiazide 20-12.5 mg oral tablet [Take 2 tablet(s) by mouth daily]    allopurinoL 300 mg oral tablet [TAKE 1 TABLET DAILY]    acetaminophen-codeine 300-30 mg oral tablet [1 p.o. BID PRN ]    glipiZIDE 5 mg oral tablet [TAKE 1 TABLET BY MOUTH BEFORE BREAKFAST AND EVENING MEAL]    Lyrica 75 mg oral capsule [take 1 capsule (75 mg) by oral route 2 times per day]    atorvastatin 20 mg oral tablet [TAKE 1 TABLET DAILY]    lancets 30 gauge and blood glucose strips combo pack  [Use as directed BID to check blood sugar]    DICLOFENAC SODIUM 1% GEL        Objective:        Vitals:         Current: 10/14/2020 1:05:13 PM    Ht:  5 ft, 10.5 in;  Wt: 297.4 lbs;  BMI: 42.1T: 97.1 F (temporal);  BP: 150/61 mm Hg (left arm, sitting);  P: 64 bpm (left arm (BP Cuff), sitting);  sCr: 1.19 mg/dL;  GFR: 70.93        Repeat:     1:6:45 PM  BP:   147/64mm Hg (right arm, sitting, HR: 54)     Exams:     PHYSICAL EXAM:     GENERAL: Vitals recorded well developed, well nourished,  morbidly obese;  well groomed;  no apparent distress;     EYES: extraocular movements intact; conjunctiva and cornea are normal; PERRLA;     E/N/T: OROPHARYNX:  normal mucosa, dentition, gingiva, and posterior pharynx;     NECK: range of motion is normal; trachea is midline;     RESPIRATORY: normal respiratory rate and pattern with no distress; normal breath sounds with no rales, rhonchi, wheezes or rubs;     CARDIOVASCULAR: normal rate; rhythm is regular;  no systolic murmur; 1+ pedal edema;     GASTROINTESTINAL: nontender;     MUSCULOSKELETAL: gait: slowed;  normal overall tone spine: flattened lumbar curve;     NEUROLOGIC: mental status: alert and oriented x 3; GROSSLY INTACT      PSYCHIATRIC:  appropriate affect and demeanor; normal speech pattern; grossly normal memory;         Assessment:         I10   Essential (primary) hypertension       E11.8   Type 2 diabetes mellitus with unspecified complications       M54.17   Radiculopathy, lumbosacral region       M72.2   Plantar fascial fibromatosis           ORDERS:         Meds Prescribed:       [Refilled] acetaminophen-codeine 300-30 mg oral tablet [1 p.o. BID PRN ], #180 (one hundred and eighty) tablets, Refills: 0 (zero)       [Refilled] lisinopriL 40 mg oral tablet [take 1 tablet (40 mg) by oral route twice a day ], #180 (one hundred and eighty) tablets, Refills: 3 (three)       [New Rx] hydroCHLOROthiazide 25 mg oral tablet [take 1 tablet (25 mg) by oral route once a day ], #90 (ninety) tablets, Refills: 3 (three)         Lab Orders:       FUTURE  Future order to be done at patients convenience  (Send-Out)            [facility]  (Send-Out)            50757  DIAB1 [facility] LIPID,CMP, A1C: 57687, 32042, 08155  (Send-Out)            53546  TSH - [facility] TSH  (Send-Out)                      Plan:         Essential (primary) hypertensionBP is elevated so lisinopril 40 mg is increased from qd to BID. Cont HCTZ 25 mg qd, metoprolol 200 mg qd, and amlodipine 5 mg.          Prescriptions:       [Refilled] lisinopriL 40 mg oral tablet [take 1 tablet (40 mg) by oral route twice a day ], #180 (one hundred and eighty) tablets, Refills: 3 (three)       [New Rx] hydroCHLOROthiazide 25 mg oral tablet [take 1 tablet (25 mg) by oral route once a day ], #90 (ninety) tablets, Refills: 3 (three)         Type 2 diabetes mellitus with unspecified complicationsDM 2 is better controlled, hopefully A1c will cont to improve, especially as he's able to walk more now s/p back surgery. Cont         FOLLOW-UP TESTING #1: FOLLOW-UP LABORATORY:  Labs to be scheduled in the future include CBC, Diabetes Panel 1; CMP, Lipid, A1C, and TSH.   Patient to  schedule in 2 months.            Orders:       FUTURE  Future order to be done at patients convenience  (Send-Out)            31780  [facility] CBC with 3 part diff  (Send-Out)            52824  DIAB - [facility] LIPID,CMP, A1C: 68682, 14523, 35882  (Send-Out)            56914  [facility] TSH  (Send-Out)              Radiculopathy, lumbosacral regionPt continues to progress from anterior/posterior fusion and decompression from L4-S1 on June 16th. Cont lyrica 75 mg BID and tylenol #3.           Prescriptions:       [Refilled] acetaminophen-codeine 300-30 mg oral tablet [1 p.o. BID PRN ], #180 (one hundred and eighty) tablets, Refills: 0 (zero)         Plantar fascial fibromatosisPt will see Dr. Gonzalez in 1 month and perhaps steroid, diclofenacc gel, and ice will help his plantar fascitis.            Patient Recommendations:        For  Type 2 diabetes mellitus with unspecified complications:            The following laboratory testing has been ordered: CBC TSH Schedule the above testing in 2 months.              Charge Capture:         Primary Diagnosis:     I10  Essential (primary) hypertension           Orders:      50430  Office/outpatient visit; established patient, level 4  (In-House)              E11.8  Type 2 diabetes mellitus with unspecified complications     M54.17  Radiculopathy, lumbosacral region     M72.2  Plantar fascial fibromatosis

## 2021-05-18 NOTE — PROGRESS NOTES
Jonas Mercado 1950     Office/Outpatient Visit    Visit Date: Tue, Sep 17, 2019 10:40 am    Provider: Yassine Corley MD (Assistant: Mimi Brooks MA)    Location: [facility]        Electronically signed by Yassine Corley MD on  09/17/2019 06:48:22 PM                             SUBJECTIVE:        CC:     Jonas is a 68 year old White male.  This is a follow-up visit.  labs         HPI:     Pt had labs on initial visit 2 weeks ago and this showed positive MARELY, positive RNP antibodies, and elevated CRP (9.6) and ESR (26). These labs suggest a systemic autoimmune condition, possibly mixed connective tissue disorder vs SLE, vs, polymyositis. Pt is unsure if he has an autoimmune condition but was getting depomedrol 80 mg IM q3 months at previous PCP for joint aches and pains.     BP today is 137/66 with a HR of 58. He is on  lisinopril/HCTZ 20/12.5 mg, metoprolol 200 mg qd, amlodipine 5 mg qd, and clonidine 0.1 mg qHS.     A1c was 8.1 on 9/4/19 (average glucose of 186). He is on metfromin 500 mg BID. He reports A1c was 6.9 about 9 months ago. He checks rarely and its often 110-180. He drinks soda rarely, coffee with cream (no sugar). He eats corn flakes or Cheerios. Diet has been worse during move and pt has been eating out much more recently.     Pt has ulcer on distal tip of 2nd toe of right foot. This has been present for about a year. Pt was hospitalized for cellulitis of right lower leg in December 2018 and March or April of 2019 for cellulitis of right lower leg.     - Pt ruptured right Achilles tendon and had surgery on 3/9/18, followed by cast for 12 weeks. Followed by walking boot from June to September 2018. Pt then had cellulitis early December 2018 and was hospitalized for this followed by out OP IV antibiotics. Cellulitis was lower shin of right leg.     ROS:     CONSTITUTIONAL:  Negative for fatigue and fever.      EYES:  Negative for blurred vision.      E/N/T:  Negative for  diminished hearing and nasal congestion.      CARDIOVASCULAR:  Positive for pedal edema ( severe ).   Negative for chest pain or palpitations.      RESPIRATORY:  Negative for recent cough and dyspnea.      GASTROINTESTINAL:  Negative for abdominal pain, constipation, diarrhea, nausea and vomiting.      GENITOURINARY:  Negative for dysuria.      MUSCULOSKELETAL:  Positive for arthralgias (mainly legs and feet).   Negative for myalgias.      INTEGUMENTARY:  Positive for poorly healing ulcer of 2nd toe of right foot.   Negative for atypical mole(s) or rash.      NEUROLOGICAL:  Positive for paresthesia ( left lower extremity ).   Negative for weakness.      PSYCHIATRIC:  Negative for anxiety, depression and sleep disturbance.          PMH/FMH/SH:     Last Reviewed on 2019 06:48 PM by Yassine Corley    Past Medical History:             PAST MEDICAL HISTORY         Type 2 Diabetes    Hypertension     Pulmonary Embolism: in ;     Gout     Peripheral Sensory Neuropathy     Hospitalizations: [facility], admitted once on          PREVENTIVE HEALTH MAINTENANCE             COLORECTAL CANCER SCREENING:; colonoscopy with normal results;          Surgical History:     1972      plantar fascia release 10/9/2018;         Family History:     Father:  at age 69; Cause of death was stroke;  Coronary Artery Disease     Mother:  at age 93; Cause of death was Old age;  Alzheimer's Disease         Social History:     Occupation: Retired (Prior occupation: [employer])     Marital Status:      Children: 2 children     Hobbies/Recreation: he enjoys fishing and hunting;     Exercise: Primary form of exercise is walking.          Tobacco/Alcohol/Supplements:     Last Reviewed on 2019 06:48 PM by Yassine Corley    Tobacco: He has a past history of cigarette smoking; quit date:  .          Alcohol: Frequency:    2 or 3x/year; When he drinks, the average quantity of alcohol is 1 drinks.   He  typically consumes beer.      Caffeine:  He admits to consuming caffeine via coffee ( 3 servings per day ) and soda ( 2-3 servings per day ).          Substance Abuse History:     Last Reviewed on 9/17/2019 06:48 PM by Yassine Corley    None         Mental Health History:     Last Reviewed on 9/17/2019 06:48 PM by Yassine Corley    NEGATIVE         Communicable Diseases (eg STDs):     Last Reviewed on 9/17/2019 06:48 PM by Yassine Corley            Current Problems:     Last Reviewed on 9/17/2019 06:48 PM by Yassine Corley    Ulcer of toes     Autoimmune disease, not elsewhere classified     History of pulmonary embolism     Degenerative disc disease     Peripheral neuropathy     Type 2 DM     Gout, unspecified     Benign HTN     Screening for depression         Immunizations:     None        Allergies:     Last Reviewed on 9/17/2019 06:48 PM by Yassine Corley    Aspirin (ASA):        Current Medications:     Last Reviewed on 9/17/2019 06:48 PM by Yassine Corley    Metformin HCl 500mg Tablets, Extended Release 2 po BID     Allopurinol 300mg Tablet 1 tab daily     Amlodipine  5mg Tablet 1 tab daily     Clonidine HCl 0.1mg Tablet take 1 tab po q hs     Lisinopril/Hydrochlorothiazide 20mg/12.5mg Tablet 1 bid     Toprol XL 200mg Tablets, Extended Release Take 1 tablet(s) by mouth daily     Gabapentin 600mg Tablet 1 po tid     Acetaminophen/Codeine 300mg/30mg Tablet         OBJECTIVE:        Vitals:         Current: 9/17/2019 10:46:38 AM    Ht:  5 ft, 10.5 in;  Wt: 287.4 lbs;  BMI: 40.7    T: 98.5 F (oral);  BP: 137/66 mm Hg (left arm, sitting);  P: 58 bpm (left arm (BP Cuff), sitting);  sCr: 1.21 mg/dL;  GFR: 69.69        Exams:     PHYSICAL EXAM:     GENERAL: Vitals recorded well developed, well nourished,  morbidly obese;  well groomed;  no apparent distress;     EYES: extraocular movements intact; conjunctiva and cornea are normal; PERRLA;     E/N/T: OROPHARYNX:  normal mucosa, dentition, gingiva, and  posterior pharynx;     NECK: range of motion is normal; thyroid exam reveals not enlarged;     RESPIRATORY: normal respiratory rate and pattern with no distress; normal breath sounds with no rales, rhonchi, wheezes or rubs;     CARDIOVASCULAR: normal rate; rhythm is regular;  no systolic murmur; no edema;     GASTROINTESTINAL: nontender; normal bowel sounds;     SKIN: ulcer on distal tip of 2nd toe of right foot, non-tender to palpation with sharp object or to pressure. Not draining.;     MUSCULOSKELETAL: normal gait; normal overall tone     NEUROLOGIC: mental status: alert and oriented x 3;     PSYCHIATRIC:  appropriate affect and demeanor; normal speech pattern; grossly normal memory;         Procedures:     Autoimmune disease, not elsewhere classified     1. Kenalog 60 mg given IM in the right hip; administered by [facility];  lot number [id number]; expires 4/21             ASSESSMENT           279.49   D89.89  Autoimmune disease, not elsewhere classified              DDx:     401.1   I10  Benign HTN              DDx:     250.00   E11.8  Type 2 DM              DDx:     707.15   L97.511  Ulcer of toes              DDx:         ORDERS:         Meds Prescribed:       Glipizide 5mg Tablets Take 1 tablet(s) by mouth before breakfast and evening meal.  #60 (Sixty) tablet(s) Refills: 2       Glucose Reagent Blood Test Strips (Glucose Reagent Blood Test Strips) Reagent Strips Check blood sugar 1-2 times per day E11.9  #100 (One [place]) strip(s) Refills: 5       Lancet  Lancet 1-2 times daily w/ one touch verio Dx E11.9  #100 (One [place]) lancet Refills: 5         Lab Orders:       APPTO  Appointment need  (In-House)           Procedures Ordered:       REFER  Referral to Specialist or Other Facility  (Send-Out)         REFER  Referral to Specialist or Other Facility  (Send-Out)           Other Orders:       [id number]  Therapeutic injection  (In-House)           Kenalog, per 10 mg (x1)                 PLAN:          Autoimmune  disease, not elsewhere classified Pt referred to rheumatology as labs suggest autoimmune etiology to polyarthralgia, possibly mixed connective tissue, polymyositis, or less likely SLE. Pt referred to either Karen Schwartz or Dr. Jiménez. Kenalog IM given today.         REFERRALS:  Referral initiated to a rheumatologist ( Dr Karen Schwartz ).  Steroids Kenalog 60 mg 1.5 ml           Orders:       REFER  Referral to Specialist or Other Facility  (Send-Out)         03890  Therapeutic injection  (In-House)                     Kenalog, per 10 mg (x1)          Benign HTN Relatively well controlled on lisinopril/HCTZ 20/12.5 mg, metoprolol 200 mg qd, amlodipine 5 mg qd, and clonidine 0.1 mg qHS. Will continue for now.           Prescriptions:       Glucose Reagent Blood Test Strips (Glucose Reagent Blood Test Strips) Reagent Strips Check blood sugar 1-2 times per day E11.9  #100 (One [place]) strip(s) Refills: 5       Lancet  Lancet 1-2 times daily w/ one touch verio Dx E11.9  #100 (One [place]) lancet Refills: 5          Type 2 DM DM 2 has worsened during his move. Will start glipizide 5 mg BID in addition to metformin 500 mg BID. Pt and wife report they will improve eating habits. Will recheck basic labs in 1 month.         FOLLOW-UP: Schedule a follow-up visit in 1 month.:.            Prescriptions:       Glipizide 5mg Tablets Take 1 tablet(s) by mouth before breakfast and evening meal.  #60 (Sixty) tablet(s) Refills: 2           Orders:       APPTO  Appointment need  (In-House)            Ulcer of toes Pt referred to wound care for poorly healing wound diabetic foot ulcer.         REFERRALS:  Referral initiated to Wound care at [facility].            Orders:       REFER  Referral to Specialist or Other Facility  (Send-Out)               Patient Recommendations:        For  Type 2 DM:     Schedule a follow-up visit in 1 month.                APPOINTMENT INFORMATION:        Monday Tuesday Wednesday Thursday Friday   Saturday Sunday            Time:___________________AM  PM   Date:_____________________         For  Ulcer of toes:     I also recommend Wound care at [facility].              CHARGE CAPTURE           **Please note: ICD descriptions below are intended for billing purposes only and may not represent clinical diagnoses**        Primary Diagnosis:         279.49 Autoimmune disease, not elsewhere classified            D89.89    Other specified disorders involving the immune mechanism, not elsewhere classified              Orders:          35838   Office/outpatient visit; established patient, level 4  (In-House)             83398   Therapeutic injection  (In-House)                                           Kenalog, per 10 mg (x1)         401.1 Benign HTN            I10    Essential (primary) hypertension    250.00 Type 2 DM            E11.8    Type 2 diabetes mellitus with unspecified complications              Orders:          APPTO   Appointment need  (In-House)           707.15 Ulcer of toes            L97.511    Non-pressure chronic ulcer of other part of right foot limited to breakdown of skin

## 2021-05-18 NOTE — PROGRESS NOTES
Jonas Mercado Gene  1950     Office/Outpatient Visit    Visit Date: Fri, Apr 30, 2021 03:42 pm    Provider: Chava Desouza MD (Assistant: Tiffany Oakes, )    Location: [facility]        Electronically signed by Chava Desouza MD on  05/14/2021 02:47:23 PM                             Subjective:        CC: Jonas is a 70 year old White male.  This is a follow-up visit.  right leg swelling/rash         HPI:       Jonas presents clinic today for swelling and pain of his right lower extremity that has been persistent for quite some time now. He was last seen in our clinic on 2/24/21 with the same complaint. Subsequent venous duplex was unremarkable. He reports that he has had previous FLASH testing that was unremarkable as well. He was given lasix 20 mg QD-BID which he says does help some but it does not take his sx away. No cold or pale feet. He has baseline lower extremity edema but notes that bilaterally it is worse than normal but more so on the right.  He denies any inciting event or injury.  He does have a prior history of PE but no prior DVT. No shortness of breath or chest pain.  No fever or chills.  No open sores or drainage.    ROS:     CONSTITUTIONAL:  Negative for fatigue and fever.      EYES:  Negative for blurred vision.      E/N/T:  Negative for diminished hearing and nasal congestion.      CARDIOVASCULAR:  Positive for pedal edema ( moderate ).   Negative for chest pain or palpitations.      RESPIRATORY:  Negative for recent cough and dyspnea.      GASTROINTESTINAL:  Negative for abdominal pain, constipation, diarrhea, nausea and vomiting.      MUSCULOSKELETAL:  Positive for arthralgias, (plantar fascitis of left foot) back pain and limb pain ( right leg pain ).   Negative for myalgias.      INTEGUMENTARY:  Negative for atypical mole(s) and rash.      NEUROLOGICAL:  Positive for paresthesia ( left lower extremity ).   Negative for weakness.      PSYCHIATRIC:  Negative for  anxiety, depression and sleep disturbance.          Past Medical History / Family History / Social History:         Last Reviewed on 2021 02:46 PM by Chava Desouza    Past Medical History:             PAST MEDICAL HISTORY         Type 2 Diabetes    Hypertension     Pulmonary Embolism: in ;     Gout     Peripheral Sensory Neuropathy     Hospitalizations: [facility], admitted once on          PREVENTIVE HEALTH MAINTENANCE             COLORECTAL CANCER SCREENING:; colonoscopy with normal results;          Surgical History:     1972     plantar fascia release 10/9/2018;         Family History:     Father:  at age 69; Cause of death was stroke;  Coronary Artery Disease     Mother:  at age 93; Cause of death was Old age;  Alzheimer's Disease         Social History:     Occupation: Retired (Prior occupation: [employer])     Marital Status:      Children: 2 children     Hobbies/Recreation: he enjoys fishing and hunting;     Exercise: Primary form of exercise is walking.          Tobacco/Alcohol/Supplements:     Last Reviewed on 2021 02:46 PM by Chava Desouza    Tobacco: He has a past history of cigarette smoking; quit date:  .          Alcohol: Frequency:    2 or 3x/year; When he drinks, the average quantity of alcohol is 1 drinks.   He typically consumes beer.      Caffeine:  He admits to consuming caffeine via coffee ( 3 servings per day ) and soda ( 2-3 servings per day ).          Substance Abuse History:     Last Reviewed on 2021 02:46 PM by Chava Desouza    None         Mental Health History:     Last Reviewed on 2021 02:46 PM by Chava Desouza    NEGATIVE         Communicable Diseases (eg STDs):     Last Reviewed on 2021 02:46 PM by Chava Desouza        Current Problems:     Last Reviewed on 2021 02:46 PM by Chava Desouza    Type 2 diabetes mellitus with unspecified complications    Essential (primary) hypertension    Gout, unspecified    Radiculopathy,  lumbosacral region    Long term (current) use of opiate analgesic    Personal history of pulmonary embolism    Other specified disorders involving the immune mechanism, not elsewhere classified    Non-pressure chronic ulcer of other part of right foot limited to breakdown of skin    Other long term (current) drug therapy    Personal history of diabetic foot ulcer    Encounter for other preprocedural examination    Encounter for screening for depression    Encounter for screening for cardiovascular disorders    Encounter for screening for malignant neoplasm of prostate    Encounter for general adult medical examination without abnormal findings    Shortness of breath    Obstructive sleep apnea (adult) (pediatric)    Pain in unspecified ankle and joints of unspecified foot    Encounter for immunization    Plantar fascial fibromatosis    Pain in unspecified joint    Localized edema        Immunizations:     influenza, high-dose, quadrivalent (FLUZONE HIGH-DOSE QUAD 2020-21) 9/30/2020    Fluzone Quadrivalent (3+ years) 10/15/2019        Allergies:     Last Reviewed on 5/14/2021 02:46 PM by Chava Desouza    Aspirin (ASA):          Current Medications:     Last Reviewed on 5/14/2021 02:46 PM by Chava Desouza    amLODIPine 10 mg oral tablet [take 1 tablet (10 mg) by oral route once daily]    metFORMIN 1,000 mg oral tablet [take 1 tablet (1,000 mg) by oral route 2 times per day with morning and evening meals]    lisinopriL 40 mg oral tablet [take 1 tablet (40 mg) daily]    allopurinoL 300 mg oral tablet [TAKE 1 TABLET DAILY]    Toprol  mg oral Tablet, Extended Release 24 hr [Take 1 tablet(s) by mouth daily]    atorvastatin 20 mg oral tablet [TAKE 1 TABLET DAILY]    lancets 30 gauge and blood glucose strips combo pack  [Use as directed BID to check blood sugar]    DICLOFENAC SODIUM 1% GEL     hydroCHLOROthiazide 25 mg oral tablet [TAKE 1 TABLET DAILY]    furosemide 20 mg oral tablet [take 1 tablet (20 mg) by oral route  2 times per day]    acetaminophen-codeine 300-30 mg oral tablet [1 p.o. TID PRN]    glipiZIDE 5 mg oral tablet [TAKE 2 TABLETS BY MOUTH BEFORE BREAKFAST AND 1 TABLET BY MOUTH WITH EVENING MEAL]    pregabalin 100 mg oral capsule [TAKE 1 CAPSULE TWICE A DAY]        Objective:        Vitals:         Current: 4/30/2021 3:48:27 PM    Ht:  5 ft, 10.5 in;  Wt: 310.4 lbs;  BMI: 43.9T: 95.9 F (temporal);  BP: 114/41 mm Hg (right arm, sitting);  P: 60 bpm (right arm (BP Cuff), sitting);  sCr: 1.12 mg/dL;  GFR: 75.70        Exams:     PHYSICAL EXAM:     GENERAL: Vitals recorded well developed, well nourished,  morbidly obese;  well groomed;  no apparent distress;     EYES: extraocular movements intact; conjunctiva and cornea are normal; PERRLA;     NECK: range of motion is normal; trachea is midline;     RESPIRATORY: normal respiratory rate and pattern with no distress; normal breath sounds with no rales, rhonchi, wheezes or rubs;     CARDIOVASCULAR: normal rate; rhythm is regular;  no systolic murmur; 2+ pedal edema;     MUSCULOSKELETAL: Tenderness to palpation of right calf;     NEUROLOGIC: mental status: alert and oriented x 3; GROSSLY INTACT     PSYCHIATRIC:  appropriate affect and demeanor; normal speech pattern; grossly normal memory;         Assessment:         R60.0   Localized edema           ORDERS:         Meds Prescribed:       [New Rx] furosemide 40 mg oral tablet [take 1 tablet (40 mg) by oral route daily], #60 (sixty) tablets, Refills: 0 (zero)         Procedures Ordered:       RFPT  Physical/Occupational Therapy Referral  (Send-Out)                      Plan:         Localized edemaDDx includes but is not limited to venous stasis, lymphedema, hypoproteinemia and CHF.  Working diagnosis at this time is venous stasis/lymphedema. We will increase Lasix to 40 mg once to twice daily as needed. Additionally, we will send him to lymphedema therapy. ED/return precautions given. Given prior history of diabetic foot  ulcers, we will have a low threshold to start antibiotic therapy if any signs of cellulitis develop.            REFERRALS:  Referral initiated to physical therapy ( [facility] ) for evaluation and treatment.            Prescriptions:       [New Rx] furosemide 40 mg oral tablet [take 1 tablet (40 mg) by oral route daily], #60 (sixty) tablets, Refills: 0 (zero)           Orders:       RFPT  Physical/Occupational Therapy Referral  (Send-Out)                  Charge Capture:         Primary Diagnosis:     R60.0  Localized edema           Orders:      [id number]  Office/outpatient visit; established patient, level 3  (In-House)

## 2021-05-18 NOTE — PROGRESS NOTES
Jonas Mercado Gene  1950     Office/Outpatient Visit    Visit Date: Thu, Nov 21, 2019 10:42 am    Provider: Yassine Corley MD (Assistant: Rachell Mcintosh MA)    Location: [facility]        Electronically signed by Yassine Corley MD on  12/09/2019 01:10:42 PM                             Subjective:        CC: Jonas is a 68 year old White male.  surgery clearance         HPI:       MRI right foot on 10/21/19 showed: partially visualized soft tissue wound at the distal 2nd toe with mild edema and possibly cellulitis. No loculated fluid collection to suggest abscess. Mild bone marrow edema like signal and enhancement in the 2nd toe distal phalanx, which could be reactive or represent early osteomyelitis. Partially imaged moderate to advanced multifocal DJD in the midfoot. Large osteophyte or enthesophyte at the plantar base of the 2nd toe proximal phalanx.  Nonspecific small great toe MTP joint effusion. Severe diffuse muscular fatty atrophy, likely neuropathic, with mild muscular edema that could reflect myositis.      Pt is seeing wound care for ulcer on distal tip of 2nd toe of right foot that has been present for a little over a year. Most recent visit was 10/14/19. He is scheduled to have an operation 11/26/19, least invasive will be a tendon release surgery and most invasive would be an amputation of distal phalange of 2nd toe of right foot. He is here for surgical clearance. MRI right foot on 10/21/19 showed edema concerning for cellulitis, no definitive presence or absence of osteomyelitis. FLASH and dopplers on 10/28/19 were relatively normal. As part of w/u pt had CXR, CBC, and CMP on 11/15 and this was normal.    - Regarding pt's risk factors, he has DM 2 that is moderately well controlled with most recent A1c of 8.1 on 9/4/19. BP is moderately well controlled as well. He has a h/o PE and completed 6 months of warfarin.      BP today is 155/69 with a HR of 58. He is on  lisinopril/HCTZ 20/12.5 mg 2 tabs qd, metoprolol 200 mg qd, and amlodipine 5 mg. Clonidine 0.1 mg qHS was d/c'd 1 month ago. He checks BP a few times a week and its typically 140/70s.    ROS:     CONSTITUTIONAL:  Negative for fatigue and fever.      EYES:  Negative for blurred vision.      E/N/T:  Negative for diminished hearing and nasal congestion.      CARDIOVASCULAR:  Positive for pedal edema ( severe ).   Negative for chest pain or palpitations.      RESPIRATORY:  Negative for recent cough and dyspnea.      GASTROINTESTINAL:  Negative for abdominal pain, constipation, diarrhea, nausea and vomiting.      GENITOURINARY:  Negative for dysuria.      MUSCULOSKELETAL:  Positive for arthralgias (plantar fascitis of left foot).   Negative for myalgias.      INTEGUMENTARY:  Positive for poorly healing ulcer of 2nd toe of right foot.   Negative for atypical mole(s) or rash.      NEUROLOGICAL:  Positive for paresthesia ( left lower extremity ).   Negative for weakness.      PSYCHIATRIC:  Negative for anxiety, depression and sleep disturbance.          Past Medical History / Family History / Social History:         Last Reviewed on 10/15/2019 06:08 PM by Yassine Corley    Past Medical History:             PAST MEDICAL HISTORY         Type 2 Diabetes    Hypertension     Pulmonary Embolism: in ;     Gout     Peripheral Sensory Neuropathy     Hospitalizations: [facility], admitted once on          PREVENTIVE HEALTH MAINTENANCE             COLORECTAL CANCER SCREENING:; colonoscopy with normal results;          Surgical History:     1972     plantar fascia release 10/9/2018;         Family History:     Father:  at age 69; Cause of death was stroke;  Coronary Artery Disease     Mother:  at age 93; Cause of death was Old age;  Alzheimer's Disease         Social History:     Occupation: Retired (Prior occupation: [employer])     Marital Status:      Children: 2 children     Hobbies/Recreation: he  enjoys fishing and hunting;     Exercise: Primary form of exercise is walking.          Tobacco/Alcohol/Supplements:     Last Reviewed on 10/15/2019 06:08 PM by Yassine Corley    Tobacco: He has a past history of cigarette smoking; quit date:  2-1998.          Alcohol: Frequency:    2 or 3x/year; When he drinks, the average quantity of alcohol is 1 drinks.   He typically consumes beer.      Caffeine:  He admits to consuming caffeine via coffee ( 3 servings per day ) and soda ( 2-3 servings per day ).          Substance Abuse History:     Last Reviewed on 10/15/2019 06:08 PM by Yassine Corley    None         Mental Health History:     Last Reviewed on 10/15/2019 06:08 PM by Yassine Corley    NEGATIVE         Communicable Diseases (eg STDs):     Last Reviewed on 10/15/2019 06:08 PM by Yassine Corley        Current Problems:     Last Reviewed on 10/15/2019 06:08 PM by Yassine Corley    Gout, unspecified    Type 2 diabetes mellitus with unspecified complications    Essential (primary) hypertension    Gout, unspecified    Other intervertebral disc degeneration, lumbosacral region    Long term (current) use of opiate analgesic    Personal history of pulmonary embolism    History of pulmonary embolism    Benign HTN    Type 2 DM    Degenerative disc disease    Peripheral neuropathy    Ulcer of toes    Non-pressure chronic ulcer of other part of right foot limited to breakdown of skin    Autoimmune disease, not elsewhere classified    Other specified disorders involving the immune mechanism, not elsewhere classified    Other long term (current) drug therapy    Plantar fascial fibromatosis    Use of high risk medications    Plantar fasciitis        Immunizations:     Fluzone Quadrivalent (3+ years) 10/15/2019        Allergies:     Last Reviewed on 10/15/2019 06:08 PM by Yassine Corley    Aspirin (ASA):          Current Medications:     Last Reviewed on 10/15/2019 06:08 PM by Yassine Corley     Lisinopril/Hydrochlorothiazide 20mg/12.5mg Tablet [1 bid]    Allopurinol 300mg Tablet [1 tab daily]    Amlodipine  5mg Tablet [1 tab daily]    Toprol XL 200mg Tablets, Extended Release [Take 1 tablet(s) by mouth daily]    Metformin HCl 500mg Tablets, Extended Release [2 po BID]    Acetaminophen/Codeine 300mg/30mg Tablet [1 p.o. TID prn pain]    Lisinopril/Hydrochlorothiazide 20mg/12.5mg Tablet [Take 2 tablet(s) by mouth daily]    Gabapentin 600mg Tablet [1 po tid]    Glipizide 5mg Tablets [Take 1 tablet(s) by mouth before breakfast and evening meal.]    Glucose Reagent Blood Test Strips  Reagent Strips [Check blood sugar 1-2 times per day E11.9]    Lancet   Lancet [1-2 times daily w/ one touch verio Dx E11.9]        Objective:        Vitals:         Current: 11/21/2019 10:47:19 AM    Ht:  5 ft, 10.5 in;  Wt: 286 lbs;  BMI: 40.5T: 98.4 F (oral);  BP: 155/69 mm Hg (left arm, sitting);  P: 58 bpm (left arm (BP Cuff), sitting);  sCr: 1.21 mg/dL;  GFR: 69.54        Repeat:     11:45:25 AM  BP:   150/74mm Hg (left arm, sitting, P-63)     Exams:     PHYSICAL EXAM:     GENERAL: Vitals recorded well developed, well nourished,  morbidly obese;  well groomed;  no apparent distress;     EYES: extraocular movements intact; conjunctiva and cornea are normal; PERRLA;     E/N/T: OROPHARYNX:  normal mucosa, dentition, gingiva, and posterior pharynx;     NECK: range of motion is normal; trachea is midline;     RESPIRATORY: normal respiratory rate and pattern with no distress; normal breath sounds with no rales, rhonchi, wheezes or rubs;     CARDIOVASCULAR: normal rate; rhythm is regular;  no systolic murmur; no edema;     GASTROINTESTINAL: nontender; normal bowel sounds;     SKIN: ulcer on distal tip of 2nd toe of right foot, non-tender to palpation with sharp object or to pressure. Not draining.;     MUSCULOSKELETAL: normal gait; normal overall tone     NEUROLOGIC: mental status: alert and oriented x 3; GROSSLY INTACT      PSYCHIATRIC:  appropriate affect and demeanor; normal speech pattern; grossly normal memory;         Assessment:         Z86.31   Personal history of diabetic foot ulcer       Z01.818   Encounter for other preprocedural examination       I10   Essential (primary) hypertension           ORDERS:         Meds Prescribed:       [Refilled] acetaminophen-codeine 300-30 mg oral tablet [1 p.o. TID prn pain], #90 (ninety) tablets, Refills: 0 (zero)         Lab Orders:       APPTO  Appointment need  (In-House)            FUTURE  Future order to be done at patients convenience  (Send-Out)            01244  BDCBC - [facility] CBC with 3 part diff  (Send-Out)            72245  DIAB2 - [facility] CMP A1C LIPID AND MICRO ALBUM CR RATIO: [id number]  (Send-Out)            [id number]  TSH - [facility] TSH  (Send-Out)                      Plan:         Personal history of diabetic foot ulcerTylenol #3 ordered for his foot pain in setting of ulcer, diabetic neuropathy, and plantar fascitis.           Prescriptions:       [Refilled] acetaminophen-codeine 300-30 mg oral tablet [1 p.o. TID prn pain], #90 (ninety) tablets, Refills: 0 (zero)         Encounter for other preprocedural examinationPt is moderate surgical risk given DM 2 and HTN, neither of which are optimally controlled, but neither of which are severely out of range. To avoid adverse reaction, no changes to medications are made today. Additionally, h/o PE 6 years ago is a risk factor although this has resolved. Additional risks are morbid obesity and KARLA. Pt is granted surgical clearance as the benfits outweight the risks and surgery is anticipated to be uncomplicated. With resolution of foot ulcer, pt will be more ambulatory, exercise more, this will overall improve life and aforementioned comorbid conditions.        Essential (primary) hypertensionBP is elevated today at 155/69 with a HR of 58. I am reluctant change medications just before his surgery next week as increasing  amlodipine may induce BLE edema and imdur is likely to induce a headache. For now, cont lisinopril/HCTZ 20/12.5 mg 2 tabs qd, metoprolol 200 mg qd, and amlodipine 5 mg.         FOLLOW-UP: Schedule a follow-up appointment in 2 weeks.:.      FOLLOW-UP TESTING #1: FOLLOW-UP LABORATORY:  Labs to be scheduled in the future include CBC, Diabetes Panel 2;CMP, A1C, Lipid, Microalbumin:Creatinine Ratio, and TSH.   Patient to schedule to be performed in 2 weeks.            Orders:       APPTO  Appointment need  (In-House)            FUTURE  Future order to be done at patients convenience  (Send-Out)            43001  BDCB - [facility] CBC with 3 part diff  (Send-Out)            96113  DIAB - [facility] CMP A1C LIPID AND MICRO ALBUM CR RATIO: [id number]  (Send-Out)            [id number]  TSH - [facility] TSH  (Send-Out)                  Patient Recommendations:        For  Essential (primary) hypertension:    Schedule a follow-up visit in 2 weeks.                APPOINTMENT INFORMATION:        Monday Tuesday Wednesday Thursday Friday Saturday Sunday            Time:___________________AM  PM   Date:_____________________         The following laboratory testing has been ordered: CBC TSH Schedule the above testing in 2 weeks.              Charge Capture:         Primary Diagnosis:     Z86.31  Personal history of diabetic foot ulcer           Orders:      [id number]  Office/outpatient visit; established patient, level 4  (In-House)              Z01.818  Encounter for other preprocedural examination     I10  Essential (primary) hypertension           Orders:      APPTO  Appointment need  (In-House)

## 2021-05-18 NOTE — PROGRESS NOTES
Jonas Mercado Gene  1950     Office/Outpatient Visit    Visit Date: Wed, Feb 24, 2021 02:10 pm    Provider: Chava Desouza MD (Assistant: Philly Reynolds MA)    Location: [facility]        Electronically signed by Chava Desouza MD on  02/24/2021 07:26:33 PM                             Subjective:        CC: Jonas is a 70 year old White male.  Right leg pain intermittent, recent exacerbation x5 days. Red and swollen         HPI:       Jonas presents clinic today for swelling and pain of his right lower extremity.  He notes that his right calf is swollen more than normal.  He has baseline lower extremity edema but notes that bilaterally it is worse than normal but more so on the right.  He denies any inciting event or injury.  He does have a prior history of PE but no prior DVT. No shortness of breath or chest pain.  No fever or chills.  No open sores or drainage.    ROS:     CONSTITUTIONAL:  Negative for fatigue and fever.      EYES:  Negative for blurred vision.      E/N/T:  Negative for diminished hearing and nasal congestion.      CARDIOVASCULAR:  Positive for pedal edema ( moderate ).   Negative for chest pain or palpitations.      RESPIRATORY:  Negative for recent cough and dyspnea.      GASTROINTESTINAL:  Negative for abdominal pain, constipation, diarrhea, nausea and vomiting.      MUSCULOSKELETAL:  Positive for arthralgias, (plantar fascitis of left foot) back pain and limb pain ( right leg pain ).   Negative for myalgias.      INTEGUMENTARY:  Negative for atypical mole(s) and rash.      NEUROLOGICAL:  Positive for paresthesia ( left lower extremity ).   Negative for weakness.      PSYCHIATRIC:  Negative for anxiety, depression and sleep disturbance.          Past Medical History / Family History / Social History:         Last Reviewed on 2/24/2021 07:26 PM by Chava Desouza    Past Medical History:             PAST MEDICAL HISTORY         Type 2 Diabetes    Hypertension      Pulmonary Embolism: in 2013;     Gout     Peripheral Sensory Neuropathy     Hospitalizations: [facility], admitted once on          PREVENTIVE HEALTH MAINTENANCE             COLORECTAL CANCER SCREENING:; colonoscopy with normal results;          Surgical History:     1972     plantar fascia release 10/9/2018;         Family History:     Father:  at age 69; Cause of death was stroke;  Coronary Artery Disease     Mother:  at age 93; Cause of death was Old age;  Alzheimer's Disease         Social History:     Occupation: Retired (Prior occupation: [employer])     Marital Status:      Children: 2 children     Hobbies/Recreation: he enjoys fishing and hunting;     Exercise: Primary form of exercise is walking.          Tobacco/Alcohol/Supplements:     Last Reviewed on 2021 07:26 PM by Chava Desouza    Tobacco: He has a past history of cigarette smoking; quit date:  .          Alcohol: Frequency:    2 or 3x/year; When he drinks, the average quantity of alcohol is 1 drinks.   He typically consumes beer.      Caffeine:  He admits to consuming caffeine via coffee ( 3 servings per day ) and soda ( 2-3 servings per day ).          Substance Abuse History:     Last Reviewed on 2021 07:26 PM by Chava Desouza    None         Mental Health History:     Last Reviewed on 2021 07:26 PM by Chava Desouza    NEGATIVE         Communicable Diseases (eg STDs):     Last Reviewed on 2021 07:26 PM by Chava Desouza        Current Problems:     Last Reviewed on 2021 07:26 PM by Chava Desouza    Type 2 diabetes mellitus with unspecified complications    Essential (primary) hypertension    Gout, unspecified    Radiculopathy, lumbosacral region    Long term (current) use of opiate analgesic    Personal history of pulmonary embolism    Other specified disorders involving the immune mechanism, not elsewhere classified    Non-pressure chronic ulcer of other part of right foot limited to  breakdown of skin    Other long term (current) drug therapy    Personal history of diabetic foot ulcer    Encounter for other preprocedural examination    Encounter for screening for depression    Encounter for screening for cardiovascular disorders    Encounter for screening for malignant neoplasm of prostate    Encounter for general adult medical examination without abnormal findings    Shortness of breath    Obstructive sleep apnea (adult) (pediatric)    Pain in unspecified ankle and joints of unspecified foot    Encounter for immunization    Plantar fascial fibromatosis    Pain in unspecified joint    Localized edema        Immunizations:     influenza, high-dose, quadrivalent (FLUZONE HIGH-DOSE QUAD 2020-21) 9/30/2020    Fluzone Quadrivalent (3+ years) 10/15/2019        Allergies:     Last Reviewed on 2/24/2021 07:26 PM by Chava Desouza    Aspirin (ASA):          Current Medications:     Last Reviewed on 2/24/2021 07:26 PM by Chava Desouza    Toprol  mg oral Tablet, Extended Release 24 hr [Take 1 tablet(s) by mouth daily]    allopurinoL 300 mg oral tablet [TAKE 1 TABLET DAILY]    acetaminophen-codeine 300-30 mg oral tablet [1 p.o. TID PRN ]    amLODIPine 10 mg oral tablet [take 1 tablet (10 mg) by oral route once daily]    metFORMIN 1,000 mg oral tablet [take 1 tablet (1,000 mg) by oral route 2 times per day with morning and evening meals]    lisinopriL 40 mg oral tablet [take 1 tablet (40 mg) daily]    glipiZIDE 5 mg oral tablet [TAKE 2 TABLETS BY MOUTH BEFORE BREAKFAST AND 1 TABLET BY MOUTH WITH EVENING MEAL]    pregabalin 100 mg oral capsule [take 1 capsule (100 mg) by oral route 2 times per day]    atorvastatin 20 mg oral tablet [TAKE 1 TABLET DAILY]    lancets 30 gauge and blood glucose strips combo pack  [Use as directed BID to check blood sugar]    DICLOFENAC SODIUM 1% GEL     hydroCHLOROthiazide 25 mg oral tablet [take 1 tablet (25 mg) by oral route once a day ]        Objective:        Vitals:          Current: 2/24/2021 2:17:26 PM    Ht:  5 ft, 10.5 inT: 97.3 F (temporal);  BP: 138/55 mm Hg (right arm, sitting);  P: 60 bpm (right arm (BP Cuff), sitting);  sCr: 1.19 mg/dL;  GFR: 48.74        Exams:     PHYSICAL EXAM:     GENERAL: Vitals recorded well developed, well nourished,  morbidly obese;  well groomed;  no apparent distress;     EYES: extraocular movements intact; conjunctiva and cornea are normal; PERRLA;     NECK: range of motion is normal; trachea is midline;     RESPIRATORY: normal respiratory rate and pattern with no distress; normal breath sounds with no rales, rhonchi, wheezes or rubs;     CARDIOVASCULAR: normal rate; rhythm is regular;  no systolic murmur; 2+ pedal edema;     MUSCULOSKELETAL: Tenderness to palpation of right calf;     NEUROLOGIC: mental status: alert and oriented x 3; GROSSLY INTACT     PSYCHIATRIC:  appropriate affect and demeanor; normal speech pattern; grossly normal memory;         Assessment:         R60.0   Localized edema           ORDERS:         Meds Prescribed:       [New Rx] furosemide 20 mg oral tablet [take 1 tablet (20 mg) by oral route 2 times per day], #30 (thirty) tablets, Refills: 0 (zero)         Radiology/Test Orders:       [id number]  Venous doppler right extremity  (Send-Out)              Lab Orders:       [id number]  [facility] CBC with 3 part diff  (Send-Out)            [id number]  COMP - [facility] Comp. Metabolic Panel  (Send-Out)            [id number]  TSH - [facility] TSH  (Send-Out)                      Plan:         Localized edema- Given Jonas's baseline lack of mobility, DVT is always a concern.  Left lower extremity venous duplex ordered today to rule this out.  Other differentials include but are not limited to venous stasis, lymphedema, hypoproteinemia and CHF.  Working diagnosis at this time, if clot ruled out, is venous stasis/lymphedema.  If renal function electrolytes are good, we will do 1 week of Lasix 20 mg twice daily and then reevaluate. ED/return precautions  given. Given prior history of diabetic foot ulcers, we will have a low threshold to start antibiotic therapy if any signs of cellulitis develop.    LABORATORY:  Labs ordered to be performed today include CBC, Comprehensive metabolic panel, and TSH.      RADIOLOGY:  I have ordered Venous doppler right leg to be done today.            Prescriptions:       [New Rx] furosemide 20 mg oral tablet [take 1 tablet (20 mg) by oral route 2 times per day], #30 (thirty) tablets, Refills: 0 (zero)           Orders:       [id number]  Venous doppler right extremity  (Send-Out)            [id number]  [facility] - [facility] CBC with 3 part diff  (Send-Out)            [id number]  COMP - [facility] Comp. Metabolic Panel  (Send-Out)            [id number]  TSH - [facility] TSH  (Send-Out)                  Charge Capture:         Primary Diagnosis:     R60.0  Localized edema           Orders:      [id number]  Office/outpatient visit; established patient, level 3  (In-House)

## 2021-05-18 NOTE — PROGRESS NOTES
Jonas Mercado Gene  1950     Office/Outpatient Visit    Visit Date: Mon, Feb 17, 2020 09:26 am    Provider: Yassine Corley MD (Assistant: Gaby Cunningham MA)    Location: [facility]        Electronically signed by Yassine Corley MD on  02/17/2020 06:08:42 PM                             Subjective:        CC: (PT BELIEVES HE HAD BOTH PNEUMONIA VACCINE IN [place], [facility])Jonas is a 69 year old White male.  This is a follow-up visit.          HPI:           PHQ-9 Depression Screening: Completed form scanned and in chart; Total Score 1       BP today is 133/60 with a HR of 68. He is on lisinopril/HCTZ 20/12.5 mg 2 tab qd, metoprolol 200 mg qd, and amlodipine 5 mg.      A1c increased from 7.1 to 7.4 on 2/10/20. He is on metfromin ER 1,000 mg BID and glipizide 5 mg BID. He checks glucose once a day and its typically about 120. Diet has been better recently, not eating out as much. He drinks soda once every 2 weeks. Exercise is limited by pain.      Pt saw Dr. Holley on 1/29/20. Pt will proceed with anterior/posterior fusion and decompression from L4-S1. He has L5 radiculopathy. MRI Lumbar spine severe foraminal stenosis and sponylolisthesis at L5-S1, L4-L5 synovial cyst and multilevel DDD. Surgery is tentatively scheduled for April 13th. Pt is on lyrica 100 mg qd and tylenol #3 either qHS or QID depending on his pain level.    ROS:     CONSTITUTIONAL:  Negative for fatigue and fever.      EYES:  Negative for blurred vision.      E/N/T:  Negative for diminished hearing and nasal congestion.      CARDIOVASCULAR:  Positive for pedal edema ( mild ).   Negative for chest pain or palpitations.      RESPIRATORY:  Negative for recent cough and dyspnea.      GASTROINTESTINAL:  Negative for abdominal pain, constipation, diarrhea, nausea and vomiting.      MUSCULOSKELETAL:  Positive for arthralgias, (plantar fascitis of left foot) back pain and limb pain ( right leg pain ).    Negative for myalgias.      INTEGUMENTARY:  Negative for atypical mole(s) and rash.      NEUROLOGICAL:  Positive for paresthesia ( left lower extremity ).   Negative for weakness.      PSYCHIATRIC:  Negative for anxiety, depression and sleep disturbance.          Past Medical History / Family History / Social History:         Last Reviewed on 2020 06:08 PM by Yassine Corley    Past Medical History:             PAST MEDICAL HISTORY         Type 2 Diabetes    Hypertension     Pulmonary Embolism: in ;     Gout     Peripheral Sensory Neuropathy     [facility]          PREVENTIVE HEALTH MAINTENANCE             COLORECTAL CANCER SCREENING:; colonoscopy with normal results;          Surgical History:     1972     plantar fascia release 10/9/2018;         Family History:     Father:  at age 69; Cause of death was stroke;  Coronary Artery Disease     Mother:  at age 93; Cause of death was Old age;  Alzheimer's Disease         Social History:     Occupation: Retired (Prior occupation: [employer])     Marital Status:      Children: 2 children     Hobbies/Recreation: he enjoys fishing and hunting;     Exercise: Primary form of exercise is walking.          Tobacco/Alcohol/Supplements:     Last Reviewed on 2020 06:08 PM by Yassine Corley    Tobacco: He has a past history of cigarette smoking; quit date:  .          Alcohol: Frequency:    2 or 3x/year; When he drinks, the average quantity of alcohol is 1 drinks.   He typically consumes beer.      Caffeine:  He admits to consuming caffeine via coffee ( 3 servings per day ) and soda ( 2-3 servings per day ).          Substance Abuse History:     Last Reviewed on 2020 06:08 PM by Yassine Corley    None         Mental Health History:     Last Reviewed on 2020 06:08 PM by Yassine Corley    NEGATIVE         Communicable Diseases (eg STDs):     Last Reviewed on 2020 06:08 PM by  Yassine Corley        Current Problems:     Last Reviewed on 2/17/2020 06:08 PM by Yassine Corley    Gout, unspecified    Radiculopathy, lumbosacral region    Type 2 diabetes mellitus with unspecified complications    Essential (primary) hypertension    Long term (current) use of opiate analgesic    Personal history of pulmonary embolism    Other specified disorders involving the immune mechanism, not elsewhere classified    Non-pressure chronic ulcer of other part of right foot limited to breakdown of skin    Other long term (current) drug therapy    Personal history of diabetic foot ulcer    Encounter for other preprocedural examination    Encounter for screening for depression        Immunizations:     Fluzone Quadrivalent (3+ years) 10/15/2019        Allergies:     Last Reviewed on 2/17/2020 06:08 PM by Yassine Corley    Aspirin (ASA):          Current Medications:     Last Reviewed on 2/17/2020 06:08 PM by Yassine Corley    lisinopril-hydrochlorothiazide 20-12.5 mg oral tablet [Take 2 tablet(s) by mouth daily]    amLODIPine 5 mg oral tablet [1 tab daily]    Toprol  mg oral Tablet, Extended Release 24 hr [Take 1 tablet(s) by mouth daily]    allopurinoL 300 mg oral tablet [1 tab daily]    metFORMIN 500 mg oral Tablet, Extended Release 24 hr [TAKE 2 TABLETS TWICE A DAY]    acetaminophen-codeine 300-30 mg oral tablet [1 p.o. TID prn pain]    glipiZIDE 5 mg oral tablet [TAKE 1 TABLET BY MOUTH BEFORE BREAKFAST AND EVENING MEAL]    Glucose Reagent Blood Test Strips  Reagent Strips [Check blood sugar 1-2 times per day E11.9]    Lancet   Lancet [1-2 times daily w/ one touch verio Dx E11.9]    Lyrica 100 mg oral capsule [take 1 capsule (100 mg) by oral route once per day]    atorvastatin 20 mg oral tablet [take 1 tablet (20 mg) by oral route once daily]        Objective:        Vitals:         Current: 2/17/2020 9:35:03 AM    Ht:  5 ft, 10.5 in;  Wt: 293.4 lbs;  BMI: 41.5T: 97.3 F (oral);  BP: 133/60 mm Hg  (right arm, sitting);  P: 68 bpm (right arm (BP Cuff), sitting);  sCr: 1.17 mg/dL;  GFR: 71.73        Exams:     PHYSICAL EXAM:     GENERAL: Vitals recorded well developed, well nourished,  morbidly obese;  well groomed;  no apparent distress, seems to be in moderate pain;     EYES: extraocular movements intact; conjunctiva and cornea are normal; PERRLA;     E/N/T: OROPHARYNX:  normal mucosa, dentition, gingiva, and posterior pharynx;     NECK: range of motion is normal; trachea is midline;     RESPIRATORY: normal respiratory rate and pattern with no distress; normal breath sounds with no rales, rhonchi, wheezes or rubs;     CARDIOVASCULAR: normal rate; rhythm is regular;  no systolic murmur; no edema;     GASTROINTESTINAL: nontender; normal bowel sounds;     MUSCULOSKELETAL: gait: affected by a left leg limp and slowed;  normal overall tone spine: flattened lumbar curve;     NEUROLOGIC: straight leg raise positive on left at 30 degrees; mental status: alert and oriented x 3; GROSSLY INTACT     PSYCHIATRIC:  appropriate affect and demeanor; normal speech pattern; grossly normal memory;         Assessment:         Z13.31   Encounter for screening for depression       I10   Essential (primary) hypertension       E11.8   Type 2 diabetes mellitus with unspecified complications       M54.17   Radiculopathy, lumbosacral region           ORDERS:         Meds Prescribed:       [Refilled] amLODIPine 5 mg oral tablet [1 tab daily], #90 (ninety) tablets, Refills: 3 (three)       [Refilled] Toprol  mg oral Tablet, Extended Release 24 hr [Take 1 tablet(s) by mouth daily], #90 (ninety) tablets, Refills: 3 (three)       [Refilled] glipiZIDE 5 mg oral tablet [TAKE 1 TABLET BY MOUTH BEFORE BREAKFAST AND EVENING MEAL], #180 (one hundred and eighty) tablets, Refills: 3 (three)         Lab Orders:       APPTO  Appointment need  (In-House)            FUTURE  Future order to be done at patients convenience  (Send-Out)             [id number]  [facility] CBC with 3 part diff  (Send-Out)            [id number]  [facility] [place] [address] CMP A1C LIPID AND MICRO ALBUM CR RATIO: [id number]  (Send-Out)            [id number]  TSH - [facility] TSH  (Send-Out)              Other Orders:         Depression screen negative  (In-House)                      Plan:         Encounter for screening for depression    MIPS PHQ-9 Depression Screening: Completed form scanned and in chart; Total Score 1; Negative Depression Screen           Orders:         Depression screen negative  (In-House)              Essential (primary) hypertensionBP is well controlled, cont lisinopril/HCTZ 20/12.5 mg 2 tab qd, metoprolol 200 mg qd, and amlodipine 5 mg.           Prescriptions:       [Refilled] amLODIPine 5 mg oral tablet [1 tab daily], #90 (ninety) tablets, Refills: 3 (three)       [Refilled] Toprol  mg oral Tablet, Extended Release 24 hr [Take 1 tablet(s) by mouth daily], #90 (ninety) tablets, Refills: 3 (three)         Type 2 diabetes mellitus with unspecified complicationsDM 2 is moderately well controlled, A1c was 7.4 on 2/10/20. Cont metfromin ER 1,000 mg BID and glipizide 5 mg BID. Pt will increase exercise after his back surgery, which hopefully will happen soon.         FOLLOW-UP: Schedule a follow-up visit in 3 months.:.      FOLLOW-UP TESTING #1: FOLLOW-UP LABORATORY:  Labs to be scheduled in the future include CBC, Diabetes Panel 2;CMP, A1C, Lipid, Microalbumin:Creatinine Ratio, and TSH.   Patient to schedule in 3 months.            Prescriptions:       [Refilled] glipiZIDE 5 mg oral tablet [TAKE 1 TABLET BY MOUTH BEFORE BREAKFAST AND EVENING MEAL], #180 (one hundred and eighty) tablets, Refills: 3 (three)           Orders:       APPTO  Appointment need  (In-House)            FUTURE  Future order to be done at patients convenience  (Send-Out)            [id number]  [facility] CBC with 3 part diff  (Send-Out)            54715  [address] [place] [address] CMP A1C LIPID AND MICRO  KRYSTAL CR RATIO: [id number]  (Send-Out)            [id number]  TSH - [facility] TSH  (Send-Out)              Radiculopathy, lumbosacral regionPt tentatively scheduled for anterior/posterior fusion and decompression from L4-S1. He has L5 radiculopathy for April 13th, at [facility]. Cont lyrica 100 mg qd and tylenol #3 TID. Pt is advised we can increase pain meds if he wants and he is very reluctant to be on anything like norco or percocet despite severe pain.            Patient Recommendations:        For  Type 2 diabetes mellitus with unspecified complications:    Schedule a follow-up visit in 3 months.                APPOINTMENT INFORMATION:        Monday Tuesday Wednesday Thursday Friday Saturday Sunday            Time:___________________AM  PM   Date:_____________________         The following laboratory testing has been ordered: CBC TSH Schedule the above testing in 3 months.              Charge Capture:         Primary Diagnosis:     Z13.31  Encounter for screening for depression           Orders:      [id number]  Office/outpatient visit; established patient, level 4  (In-House)              Depression screen negative  (In-House)              I10  Essential (primary) hypertension     E11.8  Type 2 diabetes mellitus with unspecified complications           Orders:      APPTO  Appointment need  (In-House)              M54.17  Radiculopathy, lumbosacral region

## 2021-05-18 NOTE — PROGRESS NOTES
Jonas Mercado Gene  1950     Office/Outpatient Visit    Visit Date: Fri, Jan 24, 2020 10:58 am    Provider: Yassine Corley MD (Assistant: Rachell Mcintosh MA)    Location: [facility]        Electronically signed by Yassine Corley MD on  01/24/2020 08:23:38 PM                             Subjective:        CC: Jonas is a 69 year old White male.  presents today due to sciatica         HPI: BP today is 157/63 and HR is 67.       Patient has had lower back issues since he moved in here in June that was irritated by packing boxes and moving. It is progressively gotten worse to where he can not sleep and the Tylenol 3 is not lasting over 2 to 3 hours  he has to get up and walk. He rates the pain at a 7 that starts in the lower right back, extends to his buttock and goes all the way down to his arch of his foot, the worst of the pain is from posterior knee to arch of the foot. He cannot sit in a chair for long periods of time because of the pain. Lumbar x-ray on 9/4/19 showed severe degenerative disc disease change of the lumbar spine most notably at the L5-S1 level.  There is 1.3 cm of anterolisthesis of L5 on S1.  Pt had a cyst removed from his L4/L5 region December 2016.    ROS:     CONSTITUTIONAL:  Negative for fatigue and fever.      EYES:  Negative for blurred vision.      E/N/T:  Negative for diminished hearing and nasal congestion.      CARDIOVASCULAR:  Positive for pedal edema ( mild ).   Negative for chest pain or palpitations.      RESPIRATORY:  Negative for recent cough and dyspnea.      GASTROINTESTINAL:  Negative for abdominal pain, constipation, diarrhea, nausea and vomiting.      MUSCULOSKELETAL:  Positive for arthralgias, (plantar fascitis of left foot) back pain and limb pain ( right leg pain ).   Negative for myalgias.      INTEGUMENTARY:  Negative for atypical mole(s) and rash.      NEUROLOGICAL:  Positive for paresthesia ( left lower extremity ).   Negative for weakness.       PSYCHIATRIC:  Negative for anxiety, depression and sleep disturbance.          Past Medical History / Family History / Social History:         Last Reviewed on 2020 08:21 PM by Yassine Corley    Past Medical History:             PAST MEDICAL HISTORY         Type 2 Diabetes    Hypertension     Pulmonary Embolism: in ;     Gout     Peripheral Sensory Neuropathy     Hospitalizations: [facility], admitted once on          PREVENTIVE HEALTH MAINTENANCE             COLORECTAL CANCER SCREENING:; colonoscopy with normal results;          Surgical History:     1972     plantar fascia release 10/9/2018;         Family History:     Father:  at age 69; Cause of death was stroke;  Coronary Artery Disease     Mother:  at age 93; Cause of death was Old age;  Alzheimer's Disease         Social History:     Occupation: Retired (Prior occupation: [employer])     Marital Status:      Children: 2 children     Hobbies/Recreation: he enjoys fishing and hunting;     Exercise: Primary form of exercise is walking.          Tobacco/Alcohol/Supplements:     Last Reviewed on 2020 08:21 PM by Yassine Corley    Tobacco: He has a past history of cigarette smoking; quit date:  .          Alcohol: Frequency:    2 or 3x/year; When he drinks, the average quantity of alcohol is 1 drinks.   He typically consumes beer.      Caffeine:  He admits to consuming caffeine via coffee ( 3 servings per day ) and soda ( 2-3 servings per day ).          Substance Abuse History:     Last Reviewed on 2020 08:21 PM by Yasisne Corley    None         Mental Health History:     Last Reviewed on 2020 08:21 PM by Yassine Corley    NEGATIVE         Communicable Diseases (eg STDs):     Last Reviewed on 2020 08:21 PM by Yassine Corley        Current Problems:     Last Reviewed on 2020 08:21 PM by Yassine Corley    Type 2 diabetes mellitus with unspecified complications    Essential (primary)  hypertension    Gout, unspecified    Radiculopathy, lumbosacral region    Long term (current) use of opiate analgesic    Personal history of pulmonary embolism    Other specified disorders involving the immune mechanism, not elsewhere classified    Non-pressure chronic ulcer of other part of right foot limited to breakdown of skin    Other long term (current) drug therapy    Personal history of diabetic foot ulcer    Encounter for other preprocedural examination        Immunizations:     Fluzone Quadrivalent (3+ years) 10/15/2019        Allergies:     Last Reviewed on 1/24/2020 08:21 PM by Yassine Corley    Aspirin (ASA):          Current Medications:     Last Reviewed on 1/24/2020 08:21 PM by Yassine Corley    metFORMIN 500 mg oral Tablet, Extended Release 24 hr [2 po BID]    lisinopril-hydrochlorothiazide 20-12.5 mg oral tablet [Take 2 tablet(s) by mouth daily]    amLODIPine 5 mg oral tablet [1 tab daily]    Toprol  mg oral Tablet, Extended Release 24 hr [Take 1 tablet(s) by mouth daily]    acetaminophen-codeine 300-30 mg oral tablet [1 p.o. TID prn pain]    allopurinoL 300 mg oral tablet [1 tab daily]    glipiZIDE 5 mg oral tablet [TAKE 1 TABLET BY MOUTH BEFORE BREAKFAST AND EVENING MEAL]    Glucose Reagent Blood Test Strips  Reagent Strips [Check blood sugar 1-2 times per day E11.9]    Lancet   Lancet [1-2 times daily w/ one touch verio Dx E11.9]    GABAPENTIN 600 MG TABLET        Objective:        Vitals:         Current: 1/24/2020 11:05:18 AM    Ht:  5 ft, 10.5 in;  Wt: 300 lbs;  BMI: 42.4T: 98.1 F (oral);  BP: 157/63 mm Hg (left arm, sitting);  P: 67 bpm (left arm (BP Cuff), sitting);  sCr: 1.1 mg/dL;  GFR: 77.02        Exams:     PHYSICAL EXAM:     GENERAL: Vitals recorded well developed, well nourished,  morbidly obese;  well groomed;  no apparent distress, seems to be in severe pain;     EYES: extraocular movements intact; conjunctiva and cornea are normal; PERRLA;     E/N/T: OROPHARYNX:  normal  mucosa, dentition, gingiva, and posterior pharynx;     NECK: range of motion is normal; trachea is midline;     RESPIRATORY: normal respiratory rate and pattern with no distress; normal breath sounds with no rales, rhonchi, wheezes or rubs;     CARDIOVASCULAR: normal rate; rhythm is regular;  no systolic murmur; no edema;     GASTROINTESTINAL: nontender; normal bowel sounds;     MUSCULOSKELETAL: gait: affected by a left leg limp and slowed;  normal overall tone     NEUROLOGIC: straight leg raise positive on left at 30 degrees; mental status: alert and oriented x 3; GROSSLY INTACT     PSYCHIATRIC:  appropriate affect and demeanor; normal speech pattern; grossly normal memory;         Lab/Test Results:         Amphetamines Screen, Urin: Negative (01/24/2020),     BAR-Barbiturates Screen, Urin: Negative (01/24/2020),     Buprenorphine: Negative (01/24/2020),     BZO-Benzodiazepines Screen,Ur: Negative (01/24/2020),     Cocaine(Metab.)Screen, Ur: Negative (01/24/2020),     MDMA-Ecstasy: Negative (01/24/2020),     Met-Methamphetamine: Negative (01/24/2020),     MTD-Methadone Screen, Urine: Negative (01/24/2020),     Opiate Screen, Urine: Positive (01/24/2020),     OXY-Oxycodone: Negative (01/24/2020),     PCP-Phencyclidine Screen, Uri: Negative (01/24/2020),     THC Cannabinoids Screen, Urin: Negative (01/24/2020),     Urine temperature: confirmed (01/24/2020),     Date and time of last pill: gabapentin and tylenol 3 on 1-24-20 at 0600/brown (01/24/2020),     Performed by: brown (01/24/2020),     Collection Time: 1225 (01/24/2020),             Assessment:         M54.17   Radiculopathy, lumbosacral region       Z79.899   Other long term (current) drug therapy           ORDERS:         Meds Prescribed:       [Refilled] Lyrica 100 mg oral capsule [take 1 capsule (100 mg) by oral route once per day], #30 (thirty) capsules, Refills: 0 (zero)         Radiology/Test Orders:       [id number]  Magnetic resonance imaging, spinal canal and  contents, lumbar; without contrast  (Send-Out)              Lab Orders:       [id number]  Drug test prsmv qual dir optical obs per day  (In-House)              Procedures Ordered:       REFER  Referral to Specialist or Other Facility  (Send-Out)                      Plan:         Radiculopathy, lumbosacral regionPt appears to have a lumbar radiculopathy as left sided low back pain radiates down his left leg and straight leg raise is positive at 30 degrees. Given h/o back surgery, severe DDD on lumbar x-ray, and his significant amount of pain, an MRI is ordered and pt is referred to [facility]. Will start lyrica 75 mg qd.         RADIOLOGY:  I have ordered MRI Lumbar Spine w/o contrast to be done today.      REFERRALS:  Referral initiated to a neurosurgeon ( at [facility] ).            Prescriptions:       [Refilled] Lyrica 100 mg oral capsule [take 1 capsule (100 mg) by oral route once per day], #30 (thirty) capsules, Refills: 0 (zero)           Orders:       [id number]  Magnetic resonance imaging, spinal canal and contents, lumbar; without contrast  (Send-Out)            REFER  Referral to Specialist or Other Facility  (Send-Out)              Other long term (current) drug therapy    LABORATORY:  Labs ordered to be performed today include Drug screen.            Orders:       [id number]  Drug test prsmv qual dir optical obs per day  (In-House)                  Charge Capture:         Primary Diagnosis:     M54.17  Radiculopathy, lumbosacral region           Orders:      [id number]  Office/outpatient visit; established patient, level 4  (In-House)              Z79.899  Other long term (current) drug therapy           Orders:      [id number]  Drug test prsmv qual dir optical obs per day  (In-House)

## 2021-05-18 NOTE — PROGRESS NOTES
Jonas Mercado Gene  1950     Office/Outpatient Visit    Visit Date: Fri, May 14, 2021 02:00 pm    Provider: Chava Desouza MD (Assistant: Rachell Mcintosh MA)    Location: [facility]        Electronically signed by Chava Desouza MD on  05/14/2021 03:03:28 PM                             Subjective:        CC: Jonas is a 70 year old White male.  This is a follow-up visit.          HPI:           Patient to be evaluated for type 2 diabetes mellitus with unspecified complications.  Specifically, this is type 2, non-insulin requiring diabetes without complications.  Compliance with treatment has been good; he takes his medication as directed and follows up as directed.  He denies experiencing any diabetes related symptoms.  Current meds include an oral hypoglycemic ( Glucophage ( 1000mg bid ) and Glucotrol ( 10 mg AM/5 mg PM ) ).  Most recent lab results include Hemoglobin A1c:  7.5 (%) (12/23/2020).  Concurrent health problems include hypertension.            Concerning essential (primary) hypertension, his current cardiac medication regimen includes a diuretic ( HCTZ 25 mg QD ), a beta-blocker ( TOprol  mg daily ), an ACE inhibitor ( Lisinopril 40 mg QD ), and a calcium channel blocker ( amlodipine 10 mg daily ).  Compliance with treatment has been good; he takes his medication as directed and follows up as directed.  He is tolerating the medication well without side effects.  He has not kept a blood pressure diary, but states that pressures have been too high.        Pertaining to chronic pain, Jonas has a longstanding history of low back pain and bilateral  (L>R) foot pain.  In June 2020, he underwent an L4 S5 fusion and decompression.  However, his foot pain, especially his left foot, has continued to give him issues.  He endorses significant pain over his Achilles tendon and on the lateral side of his foot.  He is seen both podiatry and foot and ankle specialist for this pain and no  one has been able to give him an exact cause.  At a previous visit, an inflammatory arthritis panel was unremarkable.  He is currently on Lyrica 100 mg twice daily but found this more helpful when he first started it. He also takes Tylenol 3 up to 3 times per day for his pain.      Pertaining to swelling and pain of the right lower extremity, Jonas reports that his sx are persistent and unchanged. He has an appointment with lymphedema therapy later this afternoon. Previous venous duplex was unremarkable. He reports that he has had previous FLASH testing that was unremarkable as well. He is currently rx'd lasix 40 mg QD-BID which he says does help some but it does not take his sx compeltely away. No cold or pale feet. He has baseline lower extremity edema but notes that bilaterally it is worse than normal but more so on the right.  He denies any inciting event or injury.  He does have a prior history of PE but no prior DVT. No shortness of breath or chest pain.  No fever or chills.  No open sores or drainage.    ROS:     CONSTITUTIONAL:  Negative for fatigue and fever.      EYES:  Negative for blurred vision.      E/N/T:  Negative for diminished hearing and nasal congestion.      CARDIOVASCULAR:  Positive for pedal edema ( moderate ).   Negative for chest pain or palpitations.      RESPIRATORY:  Negative for recent cough and dyspnea.      GASTROINTESTINAL:  Negative for abdominal pain, constipation, diarrhea, nausea and vomiting.      MUSCULOSKELETAL:  Positive for arthralgias, (plantar fascitis of left foot) back pain and limb pain ( right leg pain ).   Negative for myalgias.      INTEGUMENTARY:  Negative for atypical mole(s) and rash.      NEUROLOGICAL:  Positive for paresthesia ( left lower extremity ).   Negative for weakness.      PSYCHIATRIC:  Negative for anxiety, depression and sleep disturbance.          Past Medical History / Family History / Social History:         Last Reviewed on 5/14/2021 03:03 PM by  Chava Desouza    Past Medical History:             PAST MEDICAL HISTORY         Type 2 Diabetes    Hypertension     Pulmonary Embolism: in 2013;     Gout     Peripheral Sensory Neuropathy     Hospitalizations: [facility], admitted once on          PREVENTIVE HEALTH MAINTENANCE             COLORECTAL CANCER SCREENING:; colonoscopy with normal results;          Surgical History:     1972     plantar fascia release 10/9/2018;         Family History:     Father:  at age 69; Cause of death was stroke;  Coronary Artery Disease     Mother:  at age 93; Cause of death was Old age;  Alzheimer's Disease         Social History:     Occupation: Retired (Prior occupation: [employer])     Marital Status:      Children: 2 children     Hobbies/Recreation: he enjoys fishing and hunting;     Exercise: Primary form of exercise is walking.          Tobacco/Alcohol/Supplements:     Last Reviewed on 2021 03:03 PM by Chava Desouza    Tobacco: He has a past history of cigarette smoking; quit date:  .          Alcohol: Frequency:    2 or 3x/year; When he drinks, the average quantity of alcohol is 1 drinks.   He typically consumes beer.      Caffeine:  He admits to consuming caffeine via coffee ( 3 servings per day ) and soda ( 2-3 servings per day ).          Substance Abuse History:     Last Reviewed on 2021 03:03 PM by Chava Desouza    None         Mental Health History:     Last Reviewed on 2021 03:03 PM by Chava Desouza    NEGATIVE         Communicable Diseases (eg STDs):     Last Reviewed on 2021 03:03 PM by Chava Desouza        Current Problems:     Last Reviewed on 2021 03:03 PM by Chava Desouza    Type 2 diabetes mellitus with unspecified complications    Essential (primary) hypertension    Gout, unspecified    Radiculopathy, lumbosacral region    Long term (current) use of opiate analgesic    Personal history of pulmonary embolism    Other specified disorders involving the  immune mechanism, not elsewhere classified    Non-pressure chronic ulcer of other part of right foot limited to breakdown of skin    Other long term (current) drug therapy    Personal history of diabetic foot ulcer    Encounter for other preprocedural examination    Encounter for screening for depression    Encounter for screening for cardiovascular disorders    Encounter for screening for malignant neoplasm of prostate    Encounter for general adult medical examination without abnormal findings    Shortness of breath    Obstructive sleep apnea (adult) (pediatric)    Pain in unspecified ankle and joints of unspecified foot    Encounter for immunization    Plantar fascial fibromatosis    Pain in unspecified joint    Localized edema        Immunizations:     influenza, high-dose, quadrivalent (FLUZONE HIGH-DOSE QUAD 2020-21) 9/30/2020    Fluzone Quadrivalent (3+ years) 10/15/2019        Allergies:     Last Reviewed on 5/14/2021 03:03 PM by Chava Desouza    Aspirin (ASA):          Current Medications:     Last Reviewed on 5/14/2021 03:03 PM by Chava Desouza    lisinopriL 40 mg oral tablet [take 1 tablet (40 mg) daily]    acetaminophen-codeine 300-30 mg oral tablet [1 p.o. TID PRN]    Toprol  mg oral Tablet, Extended Release 24 hr [Take 1 tablet(s) by mouth daily]    allopurinoL 300 mg oral tablet [TAKE 1 TABLET DAILY]    metFORMIN 1,000 mg oral tablet [take 1 tablet (1,000 mg) by oral route 2 times per day with morning and evening meals]    amLODIPine 10 mg oral tablet [take 1 tablet (10 mg) by oral route once daily]    glipiZIDE 5 mg oral tablet [TAKE 2 TABLETS BY MOUTH BEFORE BREAKFAST AND 1 TABLET BY MOUTH WITH EVENING MEAL]    atorvastatin 20 mg oral tablet [TAKE 1 TABLET DAILY]    lancets 30 gauge and blood glucose strips combo pack  [Use as directed BID to check blood sugar]    DICLOFENAC SODIUM 1% GEL     hydroCHLOROthiazide 25 mg oral tablet [TAKE 1 TABLET DAILY]    furosemide 20 mg oral tablet [take 1  tablet (20 mg) by oral route 2 times per day]    furosemide 40 mg oral tablet [take 1 tablet (40 mg) by oral route daily]    pregabalin 150 mg oral capsule [take 1 capsule (150 mg) by oral route 2 times per day]        Objective:        Vitals:         Current: 5/14/2021 2:05:24 PM    Ht:  5 ft, 10.5 in;  Wt: 308.8 lbs;  BMI: 43.7T: 97.1 F (temporal);  BP: 140/64 mm Hg (left arm, sitting);  P: 60 bpm (left arm (BP Cuff), sitting);  sCr: 1.12 mg/dL;  GFR: 75.54        Exams:     PHYSICAL EXAM:     GENERAL: Vitals recorded well developed, well nourished,  morbidly obese;  well groomed;  no apparent distress;     EYES: extraocular movements intact; conjunctiva and cornea are normal; PERRLA;     NECK: range of motion is normal; trachea is midline;     RESPIRATORY: normal respiratory rate and pattern with no distress; normal breath sounds with no rales, rhonchi, wheezes or rubs;     CARDIOVASCULAR: normal rate; rhythm is regular;  no systolic murmur; 2+ pedal edema;     MUSCULOSKELETAL: Tenderness to palpation of right calf;     NEUROLOGIC: mental status: alert and oriented x 3; GROSSLY INTACT     PSYCHIATRIC:  appropriate affect and demeanor; normal speech pattern; grossly normal memory;         Assessment:         E11.8   Type 2 diabetes mellitus with unspecified complications       I10   Essential (primary) hypertension       M54.17   Radiculopathy, lumbosacral region       R60.0   Localized edema           ORDERS:         Lab Orders:       [id number]  [facility] [place] [address] LIPID,CMP, A1C: [id number], [id number], [id number]  (Send-Out)                      Plan:         Type 2 diabetes mellitus with unspecified complications- Controlled.  Continue metformin 1000 twice daily and glipizide 5 mg twice daily.  A1c ordered today.        LABORATORY:  Labs ordered to be performed today include Diabetes Panel 1; CMP, Lipid, A1C.            Orders:       [id number]  DIAB - [facility] LIPID,CMP, A1C: [id number], [id number], [id number]  (Send-Out)              Essential (primary)  hypertension- Borderline. Continue amlodipine 10 mg daily, lisinopril 40 mg daily, Toprol- mg daily and HCTZ 25 mg daily.        Radiculopathy, lumbosacral region- Chronic/stable.  However, foot pain is uncontrolled. Will increase lyrica to 150 mg twice daily and Tylenol threes as needed.        Localized edema- Establish with lymphedema therapy as directed. Continue lasix as needed. If no improvement with therapy, will pursue further workup and/or referral.             Charge Capture:         Primary Diagnosis:     E11.8  Type 2 diabetes mellitus with unspecified complications           Orders:      [id number]  Office/outpatient visit; established patient, level 4  (In-House)              I10  Essential (primary) hypertension     M54.17  Radiculopathy, lumbosacral region     R60.0  Localized edema         ADDENDUMS:      ____________________________________    Addendum: 06/17/2021 04:02 PM - Philly Reynolds        Please add the following diagnosis I89.80- Lymphodema    Same plan as for Localized Edema.     Per Dr. Desouza.

## 2021-05-18 NOTE — PROGRESS NOTES
Jonas Mercado Gene  1950     Office/Outpatient Visit    Visit Date: Wed, Dec 11, 2019 01:48 pm    Provider: Yassine Corley MD (Assistant: Mimi Brooks MA)    Location: [facility]        Electronically signed by Yassine Corley MD on  12/11/2019 07:30:39 PM                             Subjective:        CC: Jonas is a 68 year old White male.  This is a follow-up visit.  check up, medication refills         HPI: Pt's foot ulcer has healed s/p surgery 2 weeks ago with Dr. Ordonez at [facility].       BP today is 152/65 with a HR of 57. He is on lisinopril/HCTZ 20/12.5 mg 2 tabs qd, metoprolol 200 mg qd, and amlodipine 5 mg. He checks BP a few times a week and its typically 130/70s.      A1c has improved from 8.1 on 9/4/19 to 7.1 on 12/9/19. He is on metfromin ER 1,000 mg BID and glipizide 5 mg BID. He checks glucose once a day and its typically about 120. Diet has been better recently (excpet Thanksgiving). He drinks about 1 coke per week, He eats an egg sandwich for breakfast for breakfast.      Pt has pain in several joints: left foot, left lower back, hands, and right shoulder. CRP has been 9.6 nd 8.5 on labs here. Pt saw Karen Metha 11/14 and next apt is 12/18. She ordered x-rays of his hands.    ROS:     CONSTITUTIONAL:  Negative for fatigue and fever.      EYES:  Negative for blurred vision.      E/N/T:  Negative for diminished hearing and nasal congestion.      CARDIOVASCULAR:  Negative for chest pain and palpitations.      RESPIRATORY:  Negative for recent cough and dyspnea.      GASTROINTESTINAL:  Negative for abdominal pain, constipation, diarrhea, nausea and vomiting.      GENITOURINARY:  Negative for dysuria.      MUSCULOSKELETAL:  Positive for arthralgias (several joints).   Negative for myalgias.      INTEGUMENTARY:  Negative for atypical mole(s) and rash.      NEUROLOGICAL:  Positive for paresthesia ( left lower extremity ).   Negative for weakness.      PSYCHIATRIC:   Negative for anxiety, depression and sleep disturbance.          Past Medical History / Family History / Social History:         Last Reviewed on 10/15/2019 06:08 PM by Yassine Corley    Past Medical History:             PAST MEDICAL HISTORY         Type 2 Diabetes    Hypertension     Pulmonary Embolism: in ;     Gout     Peripheral Sensory Neuropathy     Hospitalizations: [facility], admitted once on          PREVENTIVE HEALTH MAINTENANCE             COLORECTAL CANCER SCREENING:; colonoscopy with normal results;          Surgical History:     1972     plantar fascia release 10/9/2018;         Family History:     Father:  at age 69; Cause of death was stroke;  Coronary Artery Disease     Mother:  at age 93; Cause of death was Old age;  Alzheimer's Disease         Social History:     Occupation: Retired (Prior occupation: [employer])     Marital Status:      Children: 2 children     Hobbies/Recreation: he enjoys fishing and hunting;     Exercise: Primary form of exercise is walking.          Tobacco/Alcohol/Supplements:     Last Reviewed on 2019 10:44 AM by Rachell Mcintosh    Tobacco: He has a past history of cigarette smoking; quit date:  .          Alcohol: Frequency:    2 or 3x/year; When he drinks, the average quantity of alcohol is 1 drinks.   He typically consumes beer.      Caffeine:  He admits to consuming caffeine via coffee ( 3 servings per day ) and soda ( 2-3 servings per day ).          Substance Abuse History:     Last Reviewed on 10/15/2019 06:08 PM by Yassine Corley    None         Mental Health History:     Last Reviewed on 10/15/2019 06:08 PM by Yassine Corley    NEGATIVE         Communicable Diseases (eg STDs):     Last Reviewed on 10/15/2019 06:08 PM by Yassine Corley        Current Problems:     Last Reviewed on 10/15/2019 06:08 PM by Yassine Corley    Gout, unspecified    Other intervertebral disc degeneration, lumbosacral region    Type 2  diabetes mellitus with unspecified complications    Essential (primary) hypertension    Long term (current) use of opiate analgesic    Personal history of pulmonary embolism    Other specified disorders involving the immune mechanism, not elsewhere classified    Non-pressure chronic ulcer of other part of right foot limited to breakdown of skin    Other long term (current) drug therapy    Plantar fascial fibromatosis    Encounter for other preprocedural examination    Personal history of diabetic foot ulcer        Immunizations:     Fluzone Quadrivalent (3+ years) 10/15/2019        Allergies:     Last Reviewed on 10/15/2019 06:08 PM by Yassine Corley    Aspirin (ASA):          Current Medications:     Last Reviewed on 11/21/2019 10:46 AM by Rachell Mcintosh    Allopurinol 300 mg oral tablet [1 tab daily]    Amlodipine  5 mg oral tablet [1 tab daily]    Toprol  mg oral Tablet, Extended Release 24 hr [Take 1 tablet(s) by mouth daily]    metFORMIN 500 mg oral Tablet, Extended Release 24 hr [2 po BID]    lisinopril-hydrochlorothiazide 20-12.5 mg oral tablet [Take 2 tablet(s) by mouth daily]    Gabapentin 600 mg oral tablet [1 po tid]    acetaminophen-codeine 300-30 mg oral tablet [1 p.o. TID prn pain]    Glipizide 5mg Tablets [Take 1 tablet(s) by mouth before breakfast and evening meal.]    Glucose Reagent Blood Test Strips  Reagent Strips [Check blood sugar 1-2 times per day E11.9]    Lancet   Lancet [1-2 times daily w/ one touch verio Dx E11.9]        Objective:        Vitals:         Current: 12/11/2019 1:54:17 PM    Ht:  5 ft, 10.5 in;  Wt: 292.4 lbs;  BMI: 41.4T: 98.5 F (oral);  BP: 152/65 mm Hg (left arm, sitting);  P: 57 bpm (left arm (BP Cuff), sitting);  sCr: 1.1 mg/dL;  GFR: 77.22        Repeat:     2:57:42 PM  BP:   156/75mm Hg (right arm, sitting) 2:57:48 PM  P:   57bpm (right arm (BP Cuff), sitting)     Exams:     PHYSICAL EXAM:     GENERAL: Vitals recorded well developed, well nourished,   morbidly obese;  well groomed;  no apparent distress;     EYES: extraocular movements intact; conjunctiva and cornea are normal; PERRLA;     E/N/T: OROPHARYNX:  normal mucosa, dentition, gingiva, and posterior pharynx;     NECK: range of motion is normal; trachea is midline;     RESPIRATORY: normal respiratory rate and pattern with no distress; normal breath sounds with no rales, rhonchi, wheezes or rubs;     CARDIOVASCULAR: normal rate; rhythm is regular;  no systolic murmur; no edema;     GASTROINTESTINAL: nontender; normal bowel sounds;     MUSCULOSKELETAL: normal gait; normal overall tone     NEUROLOGIC: mental status: alert and oriented x 3; GROSSLY INTACT     PSYCHIATRIC:  appropriate affect and demeanor; normal speech pattern; grossly normal memory;         Assessment:         I10   Essential (primary) hypertension       E11.8   Type 2 diabetes mellitus with unspecified complications       D89.89   Other specified disorders involving the immune mechanism, not elsewhere classified           ORDERS:         Lab Orders:       APPTO  Appointment need  (In-House)            FUTURE  Future order to be done at patients convenience  (Send-Out)            44587  BDCBC - [facility] CBC with 3 part diff  (Send-Out)            82761  DIAB[address] [place] [address] CMP A1C LIPID AND MICRO ALBUM CR RATIO: [id number]  (Send-Out)            54517  TSH - [facility] TSH  (Send-Out)              Procedures Ordered:       REFER  Referral to Specialist or Other Facility  (Send-Out)                      Plan:         Essential (primary) hypertensionBP slightly elevated today, but since this has been normal previously no changes to medication are made today. Cont lisinopril/HCTZ 20/12.5 mg 2 tabs qd, metoprolol 200 mg qd, and amlodipine 5 mg.        Type 2 diabetes mellitus with unspecified [id number] has improved from 8.1 to 7.1 which is great! He has improved his diet. Cont metformin 1,000 mg BID and glipizide 5 mg BID. Pt also referred  to diabetic educator.         REFERRALS:  Referral initiated to Diabetes Management Program at [facility] Diabetes Self-Management Education [facility].      FOLLOW-UP: Schedule a follow-up visit in 3 months.:.      FOLLOW-UP TESTING #1: FOLLOW-UP LABORATORY:  Labs to be scheduled in the future include CBC, Diabetes Panel 2;CMP, A1C, Lipid, Microalbumin:Creatinine Ratio, and TSH.            Orders:       REFER  Referral to Specialist or Other Facility  (Send-Out)            APPTO  Appointment need  (In-House)            FUTURE  Future order to be done at patients convenience  (Send-Out)            68530  BDCBC - [facility] CBC with 3 part diff  (Send-Out)            69379  DIAB2 - [facility] CMP A1C LIPID AND MICRO ALBUM CR RATIO: [id number]  (Send-Out)            37587  TSH - [facility] TSH  (Send-Out)              Other specified disorders involving the immune mechanism, not elsewhere classifiedf/u with Karen Metha and we will request records.             Patient Recommendations:        For  Type 2 diabetes mellitus with unspecified complications:    Schedule a follow-up visit in 3 months.                APPOINTMENT INFORMATION:        Monday Tuesday Wednesday Thursday Friday Saturday Sunday            Time:___________________AM  PM   Date:_____________________         The following laboratory testing has been ordered: CBC TSH             Charge Capture:         Primary Diagnosis:     I10  Essential (primary) hypertension           Orders:      01691  Office/outpatient visit; established patient, level 4  (In-House)              E11.8  Type 2 diabetes mellitus with unspecified complications           Orders:      APPTO  Appointment need  (In-House)              D89.89  Other specified disorders involving the immune mechanism, not elsewhere classified

## 2021-05-18 NOTE — PROGRESS NOTES
Jonas Mercado 1950     Office/Outpatient Visit    Visit Date: Tue, Oct 15, 2019 10:44 am    Provider: Yassine Corley MD (Assistant: Carolyn Smith LPN)    Location: [facility]        Electronically signed by Yassine Corley MD on  10/15/2019 06:08:40 PM                             SUBJECTIVE:        CC:     Jonas is a 68 year old White male.  This is a follow-up visit.          HPI:     Pt has numbness in feet bilaterally for several years in setting of long standing DM 2. He is on gabapentin 600 mg TID. Pt would like next refill of gabapentin [facility] as this will cost less.     BP today is 166/81 with a HR of 62. He is on lisinopril/HCTZ 20/12.5 mg, metoprolol 200 mg qd, amlodipine 5 mg qd, and clonidine 0.1 mg qHS. Pt reports severe pain in left foot, plantar fascitis is increasing his BP.     A1c was 8.1 on 9/4/19 (average glucose of 186). He is on metfromin 500 mg BID and glipizide 5 mg BID. Diet has been worse since moving here from [place] and pt has been eating out much more. He drinks soda rarely, He eats corn flakes or Cheerios for breakfast.     Pt saw wound care yesterday (10/14/19) and they are ordering MRI right foot, scheduled for 10/21/19 and vascular studies 10/28/19 and apt later that day. They told him, they expected it to have healed by now. Pt has ulcer on distal tip of 2nd toe of right foot. This has been present for about a year. Pt was hospitalized for cellulitis of right lower leg in December 2018 and March or April of 2019 for cellulitis of right lower leg.     Pt reports plantar fascitis in left foot. Pain in sole of foot, mid foot but can radiate to toes. This is worst first step out of bed in the morning. He had an achilles heel surgery previously.     ROS:     CONSTITUTIONAL:  Negative for fatigue and fever.      EYES:  Negative for blurred vision.      E/N/T:  Negative for diminished hearing and nasal congestion.      CARDIOVASCULAR:  Positive  for pedal edema ( severe ).   Negative for chest pain or palpitations.      RESPIRATORY:  Negative for recent cough and dyspnea.      GASTROINTESTINAL:  Negative for abdominal pain, constipation, diarrhea, nausea and vomiting.      GENITOURINARY:  Negative for dysuria.      MUSCULOSKELETAL:  Positive for arthralgias (plantar fascitis of left foot).   Negative for myalgias.      INTEGUMENTARY:  Positive for poorly healing ulcer of 2nd toe of right foot.   Negative for atypical mole(s) or rash.      NEUROLOGICAL:  Positive for paresthesia ( left lower extremity ).   Negative for weakness.      PSYCHIATRIC:  Negative for anxiety, depression and sleep disturbance.          PMH/FMH/SH:     Last Reviewed on 10/15/2019 06:08 PM by Yassine Corley    Past Medical History:             PAST MEDICAL HISTORY         Type 2 Diabetes    Hypertension     Pulmonary Embolism: in ;     Gout     Peripheral Sensory Neuropathy     Hospitalizations: [facility], admitted once on          PREVENTIVE HEALTH MAINTENANCE             COLORECTAL CANCER SCREENING:; colonoscopy with normal results;          Surgical History:     1972      plantar fascia release 10/9/2018;         Family History:     Father:  at age 69; Cause of death was stroke;  Coronary Artery Disease     Mother:  at age 93; Cause of death was Old age;  Alzheimer's Disease         Social History:     Occupation: Retired (Prior occupation: [employer])     Marital Status:      Children: 2 children     Hobbies/Recreation: he enjoys fishing and hunting;     Exercise: Primary form of exercise is walking.          Tobacco/Alcohol/Supplements:     Last Reviewed on 10/15/2019 06:08 PM by Yassine Corley    Tobacco: He has a past history of cigarette smoking; quit date:  .          Alcohol: Frequency:    2 or 3x/year; When he drinks, the average quantity of alcohol is 1 drinks.   He typically consumes beer.      Caffeine:  He admits to consuming  caffeine via coffee ( 3 servings per day ) and soda ( 2-3 servings per day ).          Substance Abuse History:     Last Reviewed on 10/15/2019 06:08 PM by Yassine Croley    None         Mental Health History:     Last Reviewed on 10/15/2019 06:08 PM by Yassine Corley    NEGATIVE         Communicable Diseases (eg STDs):     Last Reviewed on 10/15/2019 06:08 PM by Yassine Corley            Current Problems:     Last Reviewed on 10/15/2019 06:08 PM by Yassine Corley    Use of high risk medications     Ulcer of toes     Autoimmune disease, not elsewhere classified     History of pulmonary embolism     Degenerative disc disease     Peripheral neuropathy     Type 2 DM     Gout, unspecified     Benign HTN     Plantar fasciitis     Screening for depression         Immunizations:     Fluzone Quadrivalent (3+ years) 10/15/2019         Allergies:     Last Reviewed on 10/15/2019 06:08 PM by Yassine Corley    Aspirin (ASA):        Current Medications:     Last Reviewed on 10/15/2019 06:08 PM by Yassine Corley    Glipizide 5mg Tablets Take 1 tablet(s) by mouth before breakfast and evening meal.     Metformin HCl 500mg Tablets, Extended Release 2 po BID     Allopurinol 300mg Tablet 1 tab daily     Amlodipine  5mg Tablet 1 tab daily     Lisinopril/Hydrochlorothiazide 20mg/12.5mg Tablet 1 bid     Toprol XL 200mg Tablets, Extended Release Take 1 tablet(s) by mouth daily     Acetaminophen/Codeine 300mg/30mg Tablet 1 p.o. TID prn pain     Gabapentin 600mg Tablet 1 po tid     Glucose Reagent Blood Test Strips  Reagent Strips Check blood sugar 1-2 times per day E11.9     Lancet   Lancet 1-2 times daily w/ one touch verio Dx E11.9         OBJECTIVE:        Vitals:         Current: 10/15/2019 10:51:26 AM    Ht:  5 ft, 10.5 in;  Wt: 280.8 lbs;  BMI: 39.7    T: 62 F (oral);  BP: 166/81 mm Hg (left arm, sitting);  P: 62 bpm (left arm (BP Cuff), sitting);  sCr: 1.21 mg/dL;  GFR: 69.00        Exams:     PHYSICAL EXAM:     GENERAL:  Vitals recorded well developed, well nourished,  morbidly obese;  well groomed;  no apparent distress;     EYES: extraocular movements intact; conjunctiva and cornea are normal; PERRLA;     E/N/T: OROPHARYNX:  normal mucosa, dentition, gingiva, and posterior pharynx;     NECK: range of motion is normal; thyroid exam reveals not enlarged;     RESPIRATORY: normal respiratory rate and pattern with no distress; normal breath sounds with no rales, rhonchi, wheezes or rubs;     CARDIOVASCULAR: normal rate; rhythm is regular;  no systolic murmur; no edema;     GASTROINTESTINAL: nontender; normal bowel sounds;     SKIN: ulcer on distal tip of 2nd toe of right foot, non-tender to palpation with sharp object or to pressure. Not draining.;     MUSCULOSKELETAL: normal gait; normal overall tone     NEUROLOGIC: mental status: alert and oriented x 3;     PSYCHIATRIC:  appropriate affect and demeanor; normal speech pattern; grossly normal memory;         Lab/Test Results:             Amphetamines Screen, Urin:  Negative (10/15/2019),     BAR-Barbiturates Screen, Urin:  Negative (10/15/2019),     Buprenorphine:  Negative (10/15/2019),     BZO-Benzodiazepines Screen,Ur:  Negative (10/15/2019),     Cocaine(Metab.)Screen, Ur:  Negative (10/15/2019),     MDMA-Ecstasy:  Negative (10/15/2019),     Met-Methamphetamine:  Negative (10/15/2019),     MTD-Methadone Screen, Urine:  Negative (10/15/2019),     Opiate Screen, Urine:  Positive (10/15/2019),     OXY-Oxycodone:  Negative (10/15/2019),     PCP-Phencyclidine Screen, Uri:  Negative (10/15/2019),     THC Cannabinoids Screen, Urin:  Negative (10/15/2019),     Date and time of last pill:  Gabapentin 10/15/19 2 9 am (10/15/2019),     Performed by:  micki (10/15/2019),     Collection Time:  1057 (10/15/2019),             Procedures:     Vaccination against other viral diseases, Influenza     1. Influenza, seasonal (children 3 years to adult): 0.5 ml unit dose given IM in the right upper arm;  administered by and;  lot number [id number]; expires 6/30/20 Regarding contraindications to an Influenza vaccine: Denies moderate/severe illness with/without fever; serious reaction to eggs, egg proteins, gentamicin, gelatin, arginine, neomycin or polymixin; serious reaction after recieving previous influenza vaccines; and history of Guillain-Diamond Syndrome.              ASSESSMENT           V58.69   Z79.899  Use of high risk medications              DDx:     356.9   Z79.891  Peripheral neuropathy              DDx:     401.1   I10  Benign HTN              DDx:     250.00   E11.8  Type 2 DM              DDx:     707.15   L97.511  Ulcer of toes              DDx:     728.71   M72.2  Plantar fasciitis              DDx:     V04.81   Z23  Vaccination against other viral diseases, Influenza              DDx:         ORDERS:         Meds Prescribed:       Refill of: Acetaminophen/Codeine 300mg/30mg Tablet 1 p.o. TID prn pain  #90 (Ninety) tablet(s) Refills: 0       Lisinopril/Hydrochlorothiazide 20mg/12.5mg Tablet Take 2 tablet(s) by mouth daily  #180 (One [place] and Eighty) tablet(s) Refills: 2         Lab Orders:       [id number]  Drug test prsmv read direct optical obs pr date  (In-House)         APPTO  Appointment need  (In-House)           Procedures Ordered:       REFER  Referral to Specialist or Other Facility  (Send-Out)           Other Orders:       [id number]  Influenza virus vaccine, quadrivalent, split virus, preservative free 3 years of age & older  (In-House)           Medicare Flu Administration  (In-House)                   PLAN:          Use of high risk medications           Orders:       [id number]  Drug test prsmv read direct optical obs pr date  (In-House)            Peripheral neuropathy Will [facility] next refill of [facility]entin [facility].          Benign HTN Due to elevated BP today we are increasing lisinopril/HCTZ 20/12.5 mg to 2 tab qd (pt reports combo pill is less expensive), clonidine is d/c'd as this  is supposed to be a BID medication and not qHS. Cont metoprolol 200 mg qd.           Prescriptions:       Lisinopril/Hydrochlorothiazide 20mg/12.5mg Tablet Take 2 tablet(s) by mouth daily  #180 (One [place] and Eighty) tablet(s) Refills: 2          Type 2 DM We discussed pt's diet extensively today. Pt advised we can cont metfromin 500 mg BID and glipizide 5 mg BID without adding additional medication if he can improve his diet. If A1c remains > 7.5 we will need to increase one of the doses or add a new medication.         FOLLOW-UP: Schedule a follow-up visit in 1 month.:.            Orders:       APPTO  Appointment need  (In-House)            Ulcer of toes f/u with wound care clinic and obtain studies as ordered. Wound appears moderately well controlled today.          Plantar fasciitis Pt referred to podiatry and refill of tylenol #3 given for severe pain that is limiting his ambulation.         REFERRALS:  Referral initiated to a podiatrist ( [facility] ).            Prescriptions:       Refill of: Acetaminophen/Codeine 300mg/30mg Tablet 1 p.o. TID prn pain  #90 (Ninety) tablet(s) Refills: 0           Orders:       REFER  Referral to Specialist or Other Facility  (Send-Out)            Vaccination against other viral diseases, Influenza           Orders:       68308  Influenza virus vaccine, quadrivalent, split virus, preservative free 3 years of age & older  (In-House)           Medicare Flu Administration  (In-House)               Patient Recommendations:        For  Type 2 DM:     Schedule a follow-up visit in 1 month.                APPOINTMENT INFORMATION:        Monday Tuesday Wednesday Thursday Friday Saturday Sunday            Time:___________________AM  PM   Date:_____________________             CHARGE CAPTURE           **Please note: ICD descriptions below are intended for billing purposes only and may not represent clinical diagnoses**        Primary Diagnosis:          V58.69 Use of high risk medications            Z79.899    Other long term (current) drug therapy              Orders:          [id number]   Office/outpatient visit; established patient, level 4  (In-House)             13908   Drug test prsmv read direct optical obs pr date  (In-House)           356.9 Peripheral neuropathy            Z79.891    Long term (current) use of opiate analgesic    401.1 Benign HTN            I10    Essential (primary) hypertension    250.00 Type 2 DM            E11.8    Type 2 diabetes mellitus with unspecified complications              Orders:          APPTO   Appointment need  (In-House)           707.15 Ulcer of toes            L97.511    Non-pressure chronic ulcer of other part of right foot limited to breakdown of skin    728.71 Plantar fasciitis            M72.2    Plantar fascial fibromatosis    V04.81 Vaccination against other viral diseases, Influenza            Z23    Encounter for immunization              Orders:          18488   Influenza virus vaccine, quadrivalent, split virus, preservative free 3 years of age & older  (In-House)                Medicare Flu Administration  (In-House)               ADDENDUMS:      ____________________________________    Addendum: 10/28/2019 09:06 PM - Yassine Corley        ADDENDUM: Add G60.9; Remove Z79.891

## 2021-05-22 ENCOUNTER — TRANSCRIBE ORDERS (OUTPATIENT)
Dept: PHYSICAL THERAPY | Facility: CLINIC | Age: 71
End: 2021-05-22

## 2021-05-22 DIAGNOSIS — R60.0 LOCALIZED EDEMA: Primary | ICD-10-CM

## 2021-06-04 ENCOUNTER — HOSPITAL ENCOUNTER (OUTPATIENT)
Dept: PHYSICAL THERAPY | Facility: CLINIC | Age: 71
Setting detail: RECURRING SERIES
Discharge: HOME | End: 2021-06-04
Attending: FAMILY MEDICINE

## 2021-06-08 ENCOUNTER — TELEPHONE (OUTPATIENT)
Dept: FAMILY MEDICINE CLINIC | Age: 71
End: 2021-06-08

## 2021-06-08 NOTE — TELEPHONE ENCOUNTER
Caller: ELIAS    Relationship: [facility]    Best call back number:     What form or medical record are you requesting: CLINICAL NOTES WITHIN LAST 6 MONTHS WITH LYMPHEDEMA DIAGNOSIS     Who is requesting this form or medical record from you: [employer] [facility] [place]     How would you like to receive the form or medical records (pick-up, mail, fax): FAX  If fax, what is the fax number: [phone]    Timeframe paperwork needed: AS SOON AS POSSIBLE

## 2021-06-10 NOTE — TELEPHONE ENCOUNTER
Caller: Jonas Mercado Gene    Relationship: Self    Best call back number: [phone]    Medication needed:   Acetaminophen morphine 300/30mg    When do you need the refill by: 6/10/21    Does the patient have less than a 3 day supply:  [x] Yes  [] No    What is the patient's preferred pharmacy:    [facility]  [phone]

## 2021-06-11 RX ORDER — PYRAZINAMIDE 500 MG/1
1 TABLET ORAL 3 TIMES DAILY
COMMUNITY
End: 2021-06-11 | Stop reason: SDUPTHER

## 2021-06-11 RX ORDER — PYRAZINAMIDE 500 MG/1
1 TABLET ORAL 3 TIMES DAILY
Qty: 90 TABLET | Refills: 0 | Status: SHIPPED | OUTPATIENT
Start: 2021-06-11 | End: 2021-07-13 | Stop reason: SDUPTHER

## 2021-06-11 NOTE — TELEPHONE ENCOUNTER
Caller: [facility]    Relationship: PROVIDER    Best call back number: [phone]     What form or medical record are you requesting: JUST ONE CLINICIAL NOTE WITHIN THE LAST 6 MONTHS, ADDRESSING THE LYMPHEDEMA DIAGNOSIS     How would you like to receive the form or medical records (pick-up, mail, fax): FAX  If fax, what is the fax number: [phone]    Timeframe paperwork needed: ASAP    Additional notes: THEY NEED CLINICAL DOCUMENTATION IN ORDER TO GET THE PATIENT MEDICAL EQUIPMENT.

## 2021-06-17 ENCOUNTER — TELEPHONE (OUTPATIENT)
Dept: FAMILY MEDICINE CLINIC | Age: 71
End: 2021-06-17

## 2021-06-17 NOTE — TELEPHONE ENCOUNTER
Please advise.     Last OV was 5/14/21. Per note see below. Can you make addendum or do we need a new OV?    Localized edema- Establish with lymphedema therapy as directed. Continue lasix as needed. If no improvement with therapy, will pursue further workup and/or referral

## 2021-06-17 NOTE — TELEPHONE ENCOUNTER
Caller: [employer]    Relationship: DME COMPANY    Best call back number: [phone]    Do you require a callback: DME COMPANY CALLED REGARDING ORDER FOR LYMPHEDEMA PUMP FOR THE PATIENTS LEGS, THEY NEED NEED A LYMPHEDEMA DIAGNOSIS ADDED TO NOTE. I89.0 FOR  ICD 10    [phone]

## 2021-06-18 NOTE — TELEPHONE ENCOUNTER
Caller: Jonas Mercado Gene    Relationship: Self    Best call back number: [phone]    Medication needed:   pregabalin 150 mg CAP     When do you need the refill by: ASAP    What additional details did the patient provide when requesting the medication: PATIENT STATES DR BENZ INCREASED THIS AND IT IS HELPING  Does the patient have less than a 3 day supply:  [] Yes  [x] No    What is the patient's preferred pharmacy: [facility] - [address] [phone] [facility] [phone]

## 2021-06-21 RX ORDER — METOPROLOL SUCCINATE 200 MG/1
200 TABLET, EXTENDED RELEASE ORAL DAILY
COMMUNITY
End: 2021-09-02 | Stop reason: SDUPTHER

## 2021-06-21 RX ORDER — PREGABALIN 150 MG/1
150 CAPSULE ORAL 2 TIMES DAILY
Qty: 60 CAPSULE | Refills: 2 | Status: SHIPPED | OUTPATIENT
Start: 2021-06-21 | End: 2021-09-16 | Stop reason: SDUPTHER

## 2021-06-21 RX ORDER — PREGABALIN 150 MG/1
150 CAPSULE ORAL 2 TIMES DAILY
COMMUNITY
End: 2021-06-21 | Stop reason: SDUPTHER

## 2021-06-21 RX ORDER — FUROSEMIDE 40 MG/1
40 TABLET ORAL DAILY
COMMUNITY
End: 2021-09-15

## 2021-06-21 RX ORDER — FUROSEMIDE 20 MG/1
20 TABLET ORAL 2 TIMES DAILY
COMMUNITY
End: 2021-09-15 | Stop reason: DRUGHIGH

## 2021-06-21 RX ORDER — ATORVASTATIN CALCIUM 20 MG/1
20 TABLET, FILM COATED ORAL DAILY
COMMUNITY
End: 2021-07-28

## 2021-06-21 RX ORDER — ALLOPURINOL 300 MG/1
300 TABLET ORAL DAILY
COMMUNITY
End: 2021-07-28

## 2021-06-21 RX ORDER — GLIPIZIDE 5 MG/1
5 TABLET ORAL
COMMUNITY
End: 2021-09-15 | Stop reason: SDUPTHER

## 2021-06-21 RX ORDER — HYDROCHLOROTHIAZIDE 25 MG/1
25 TABLET ORAL DAILY
COMMUNITY
End: 2021-09-15 | Stop reason: SDUPTHER

## 2021-06-21 RX ORDER — LISINOPRIL 40 MG/1
40 TABLET ORAL DAILY
COMMUNITY
End: 2021-09-15 | Stop reason: SDUPTHER

## 2021-06-21 RX ORDER — AMLODIPINE BESYLATE 10 MG/1
5 TABLET ORAL DAILY
COMMUNITY
End: 2021-07-01 | Stop reason: DRUGHIGH

## 2021-07-01 ENCOUNTER — OFFICE VISIT (OUTPATIENT)
Dept: PODIATRY | Facility: CLINIC | Age: 71
End: 2021-07-01

## 2021-07-01 VITALS
SYSTOLIC BLOOD PRESSURE: 137 MMHG | WEIGHT: 287.4 LBS | BODY MASS INDEX: 40.23 KG/M2 | HEART RATE: 58 BPM | DIASTOLIC BLOOD PRESSURE: 66 MMHG | TEMPERATURE: 98.5 F | HEIGHT: 71 IN

## 2021-07-01 VITALS
HEIGHT: 71 IN | TEMPERATURE: 98.5 F | HEART RATE: 57 BPM | DIASTOLIC BLOOD PRESSURE: 75 MMHG | WEIGHT: 292.4 LBS | SYSTOLIC BLOOD PRESSURE: 156 MMHG | BODY MASS INDEX: 40.94 KG/M2

## 2021-07-01 VITALS
WEIGHT: 288.6 LBS | BODY MASS INDEX: 40.4 KG/M2 | HEART RATE: 58 BPM | HEIGHT: 71 IN | SYSTOLIC BLOOD PRESSURE: 146 MMHG | TEMPERATURE: 97.8 F | DIASTOLIC BLOOD PRESSURE: 64 MMHG

## 2021-07-01 VITALS
HEART RATE: 62 BPM | WEIGHT: 280.8 LBS | DIASTOLIC BLOOD PRESSURE: 81 MMHG | SYSTOLIC BLOOD PRESSURE: 166 MMHG | HEIGHT: 71 IN | BODY MASS INDEX: 39.31 KG/M2

## 2021-07-01 VITALS
TEMPERATURE: 98 F | SYSTOLIC BLOOD PRESSURE: 120 MMHG | DIASTOLIC BLOOD PRESSURE: 71 MMHG | OXYGEN SATURATION: 98 % | HEART RATE: 73 BPM | BODY MASS INDEX: 41.31 KG/M2 | WEIGHT: 305 LBS | HEIGHT: 72 IN

## 2021-07-01 VITALS
TEMPERATURE: 98.4 F | HEIGHT: 71 IN | DIASTOLIC BLOOD PRESSURE: 74 MMHG | HEART RATE: 58 BPM | SYSTOLIC BLOOD PRESSURE: 150 MMHG | BODY MASS INDEX: 40.04 KG/M2 | WEIGHT: 286 LBS

## 2021-07-01 DIAGNOSIS — G62.9 NEUROPATHY: ICD-10-CM

## 2021-07-01 DIAGNOSIS — E11.8 DIABETIC FOOT (HCC): ICD-10-CM

## 2021-07-01 DIAGNOSIS — B35.1 ONYCHOMYCOSIS: ICD-10-CM

## 2021-07-01 DIAGNOSIS — M79.672 FOOT PAIN, BILATERAL: Primary | ICD-10-CM

## 2021-07-01 DIAGNOSIS — M79.671 FOOT PAIN, BILATERAL: Primary | ICD-10-CM

## 2021-07-01 DIAGNOSIS — E11.9 NON-INSULIN DEPENDENT TYPE 2 DIABETES MELLITUS (HCC): ICD-10-CM

## 2021-07-01 DIAGNOSIS — L60.0 ONYCHOCRYPTOSIS: ICD-10-CM

## 2021-07-01 PROCEDURE — G8404 LOW EXTEMITY NEUR EXAM DOCUM: HCPCS | Performed by: PODIATRIST

## 2021-07-01 PROCEDURE — 99213 OFFICE O/P EST LOW 20 MIN: CPT | Performed by: PODIATRIST

## 2021-07-01 PROCEDURE — 11721 DEBRIDE NAIL 6 OR MORE: CPT | Performed by: PODIATRIST

## 2021-07-01 RX ORDER — AMLODIPINE BESYLATE 5 MG/1
TABLET ORAL
COMMUNITY
End: 2021-09-15 | Stop reason: DRUGHIGH

## 2021-07-01 NOTE — PATIENT INSTRUCTIONS
Diabetic foot exam performed and documented this date, compliant with CQM required standards. Detail of findings as noted in physical exam.  Lower extremity Neurologic exam for diabetic patient performed and documented this date, compliant with PQRS required standards. Detail of findings as noted in physical exam.  Advised patient importance of good routine lower extremity hygiene. Advised patient importance of evaluating for intact skin and pain free nail borders.  Advised patient to use mirror to evaluate plantar/ soles of feet for better visualization. Advised patient monitor and phone office to be seen if any cracking to skin, open lesions, painful nail borders or if nails become elongated prior to next visit. Advised patient importance of daily cleansing of lower extremities, followed by good skin cream to maintain normal hydration of skin. Also advised patient importance of close daily monitoring of blood sugar. Advised to regulate diet and medications to maintain control of blood sugar in optimal range. Contact primary care provider if difficulties maintaining blood sugar levels.  Advised Patient of presence of Diabetes Mellitus condition.  Advised Patient risk of progression and worsening or improvement, then return of condition.  Will monitor condition for any change in future. Treat with most appropriate treatment pending status of condition.  Counseled and advised patient extensively on nature and ramifications of diabetes. Standard instructions given to patient for good diabetic foot care and maintenance. Advised importance of careful monitoring to avoid break down and complications secondary to diabetes. Advised patient importance of strict maintenance of blood sugar control. Advised patient of possible ominous results from neglect of condition, i.e.: amputation/ loss of digits, feet and legs, or even death.  Patient states understands counseling, will monitor closely, continue good hygiene and  routine diabetic foot care. Patient will contact office is questions or problems.      Patient is to monitor for recurrence and any new symptoms and to contact Dr. Gonzalez's office for a follow-up appointment.      The patient states understanding and agreement with this plan.

## 2021-07-01 NOTE — PROGRESS NOTES
[facility] - PODIATRY    Today's Date: 07/01/21    Patient Name: Jonas Mercado  MRN: [id number]  CSN: [id number]  PCP: Chava Desouza MD  Referring Provider: No ref. provider found    SUBJECTIVE     Chief Complaint   Patient presents with   • Left Foot - Nail Problem   • Right Foot - Nail Problem     HPI: Jonas Mercado, a 70 y.o.male, comes to clinic.    New, Established, New Problem:  established     Location:  Toenails    Duration:   Greater than five years    Onset:  Gradual    Nature:  sore with palpation.    Stable, worsening, improving:   Worsening    Aggravating factors:  Pain with shoe gear and ambulation.    Previous Treatment:  debridement  __________________________________    New, Established, New Problem:  Established    Location:  bilateral feet    Duration:    Onset:  gradual    Nature:   Controlled     Stable, worsening, improving:  stable    Patient reported last blood glucose: Patient states they are unsure of the most recent blood glucose reading.  __________________________________    Patient reports the following medical changes since their last visit: Myelogram for low back issues.    No other pedal complaints at this time.    Patient denies any fevers, chills, nausea, vomiting, shortness of breathe, nor any other constitutional signs nor symptoms.       Past Medical History:   Diagnosis Date   • Achilles tendon rupture    • Arthritis    • Back pain    • Bilateral ankle pain    • Corns and callus    • Diabetes mellitus type 2, controlled (CMS/HCC)    • Hammertoe    • Heel pain    • Hyperlipemia    • Left foot pain    • Lymphedema    • Numbness in feet    • Pulmonary embolism (CMS/HCC)      Past Surgical History:   Procedure Laterality Date   • BACK SURGERY     • CARPAL TUNNEL RELEASE     • FOOT SURGERY     • KNEE ARTHROSCOPY      DIAGNOSTIC     Family History   Problem Relation Age of Onset   • Stroke Father    • Heart disease Brother      Social History      Socioeconomic History   • Marital status:      Spouse name: Not on file   • Number of children: Not on file   • Years of education: Not on file   • Highest education level: Not on file   Tobacco Use   • Smoking status: Former Smoker     Types: Cigarettes     Quit date: 1998     Years since quittin.4   • Smokeless tobacco: Never Used   Vaping Use   • Vaping Use: Never used   Substance and Sexual Activity   • Alcohol use: Not Currently     Comment: VERY RARE   • Drug use: Never   • Sexual activity: Defer     Allergies   Allergen Reactions   • Aspirin Unknown - Low Severity and Anaphylaxis     Current Outpatient Medications   Medication Sig Dispense Refill   • acetaminophen-codeine (TYLENOL with CODEINE #3) 300-30 MG per tablet Take 1 tablet by mouth 3 (Three) Times a Day. 90 tablet 0   • allopurinol (ZYLOPRIM) 300 MG tablet Take 300 mg by mouth Daily.     • amLODIPine (NORVASC) 5 MG tablet amlodipine 5 mg oral tablet take 1 tablet (5 mg) by oral route once daily   Active     • atorvastatin (LIPITOR) 20 MG tablet Take 20 mg by mouth Daily.     • Diclofenac Sodium (Voltaren) 1 % gel gel Apply 2 g topically to the appropriate area as directed 4 (Four) Times a Day. 200 g 0   • glipizide (GLUCOTROL) 5 MG tablet Take 5 mg by mouth. 2 tabs in AM & 1 tab in pm     • hydroCHLOROthiazide (HYDRODIURIL) 25 MG tablet Take 25 mg by mouth Daily.     • lisinopril (PRINIVIL,ZESTRIL) 40 MG tablet Take 40 mg by mouth Daily.     • metFORMIN (GLUCOPHAGE) 1000 MG tablet Take 1,000 mg by mouth 2 (Two) Times a Day With Meals.     • metoprolol succinate XL (TOPROL-XL) 200 MG 24 hr tablet Take 200 mg by mouth Daily.     • pregabalin (LYRICA) 150 MG capsule Take 1 capsule by mouth 2 (Two) Times a Day. 60 capsule 2   • amLODIPine (NORVASC) 10 MG tablet Take 5 mg by mouth Daily.     • furosemide (LASIX) 20 MG tablet Take 20 mg by mouth 2 (Two) Times a Day.     • furosemide (LASIX) 40 MG tablet Take 40 mg by mouth Daily.        No current facility-administered medications for this visit.     Review of Systems   Constitutional: Negative.    Skin:        Toenails 1 through 5 bilaterally   Neurological: Positive for numbness.   All other systems reviewed and are negative.      OBJECTIVE     Vitals:    21 1302   BP: 120/71   Pulse: 73   Temp: 98 °F (36.7 °C)   SpO2: 98%       Lab Results   Component Value Date    HGBA1C 8.1 (H) 2021       Lab Results   Component Value Date    CALCIUM 9.3 2021     2021    K 3.8 2021    CO2 29 2021    CL 99 2021    BUN 23 2021    CREATININE 1.14 2021    BCR 20 2021    ANIONGAP 15 2021       Patient seen in no apparent distress.      PHYSICAL EXAM:     Foot/Ankle Exam:       General:   Appearance: obesity and elderly    Orientation: AAOx3    Affect: appropriate    Shoe Gear:  Casual shoes    VASCULAR      Right Foot Vascularity   Dorsalis pedis:  1+  Posterior tibial:  1+  Skin Temperature: cool    Edema Grading:  Pitting and 2+  CFT:  3  Pedal Hair Growth:  Absent  Varicosities: mild varicosities       Left Foot Vascularity   Dorsalis pedis:  1+  Posterior tibial:  1+  Skin Temperature: cool    Edema Gradin+ and pitting  CFT:  3  Pedal Hair Growth:  Absent  Varicosities: mild varicosities        NEUROLOGIC     Right Foot Neurologic   Light touch sensation:  Diminished  Vibratory sensation:  Diminished  Hot/Cold sensation: diminished    Protective Sensation using Franklin-Cheryl Monofilament:  Diminished     Left Foot Neurologic   Light touch sensation:  Diminished  Vibratory sensation:  Diminished  Hot/cold sensation: diminished    Protective Sensation using Franklin-Cheryl Monofilament:  Diminished     MUSCLE STRENGTH     Right Foot Muscle Strength   Normal strength    Foot dorsiflexion:  5  Foot plantar flexion:  5  Foot inversion:  5  Foot eversion:  5     Left Foot Muscle Strength   Normal strength    Foot dorsiflexion:   5  Foot plantar flexion:  5  Foot inversion:  5  Foot eversion:  5     DERMATOLOGIC     Right Foot Dermatologic   Skin: skin intact    Nails: onychomycosis, abnormally thick, subungual debris, dystrophic nails and ingrown toenail    Nails comment:  Toenails 1 through 5     Left Foot Dermatologic   Skin: skin intact    Nails: onychomycosis, abnormally thick, subungual debris, dystrophic nails and ingrown toenail    Nails comment:  Toenails 1 through 5      ASSESSMENT/PLAN     Diagnoses and all orders for this visit:    1. Foot pain, bilateral (Primary)  -     Diclofenac Sodium (Voltaren) 1 % gel gel; Apply 2 g topically to the appropriate area as directed 4 (Four) Times a Day.  Dispense: 200 g; Refill: 0    2. Diabetic foot (CMS/[facility])    3. Onychomycosis    4. Onychocryptosis    5. Non-insulin dependent type 2 diabetes mellitus (CMS/[facility])    6. Neuropathy        Comprehensive lower extremity examination and evaluation was performed.    Discussed findings and treatment plan including risks, benefits, and treatment options with patient in detail. Patient agreed with treatment plan.    Toenails 1 through 5 bilaterally were debrided in thickness and length and then smoothed with a Dremel Tool.  Tolerated the procedure well without complications.    Diabetic foot exam performed and documented this date, compliant with CQM required standards. Detail of findings as noted in physical exam.  Lower extremity Neurologic exam for diabetic patient performed and documented this date, compliant with PQRS required standards. Detail of findings as noted in physical exam.  Advised patient importance of good routine lower extremity hygiene. Advised patient importance of evaluating for intact skin and pain free nail borders.  Advised patient to use mirror to evaluate plantar/ soles of feet for better visualization. Advised patient monitor and phone office to be seen if any cracking to skin, open lesions, painful nail borders or if nails  become elongated prior to next visit. Advised patient importance of daily cleansing of lower extremities, followed by good skin cream to maintain normal hydration of skin. Also advised patient importance of close daily monitoring of blood sugar. Advised to regulate diet and medications to maintain control of blood sugar in optimal range. Contact primary care provider if difficulties maintaining blood sugar levels.  Advised Patient of presence of Diabetes Mellitus condition.  Advised Patient risk of progression and worsening or improvement, then return of condition.  Will monitor condition for any change in future. Treat with most appropriate treatment pending status of condition.  Counseled and advised patient extensively on nature and ramifications of diabetes. Standard instructions given to patient for good diabetic foot care and maintenance. Advised importance of careful monitoring to avoid break down and complications secondary to diabetes. Advised patient importance of strict maintenance of blood sugar control. Advised patient of possible ominous results from neglect of condition, i.e.: amputation/ loss of digits, feet and legs, or even death.  Patient states understands counseling, will monitor closely, continue good hygiene and routine diabetic foot care. Patient will contact office is questions or problems.      An After Visit Summary was printed and given to the patient at discharge, including (if requested) any available informative/educational handouts regarding diagnosis, treatment, or medications. All questions were answered to patient/family satisfaction. Should symptoms fail to improve or worsen they agree to call or return to clinic or to go to the Emergency Department. Discussed the importance of following up with any needed screening tests/labs/specialist appointments and any requested follow-up recommended by me today. Importance of maintaining follow-up discussed and patient accepts that missed  appointments can delay diagnosis and potentially lead to worsening of conditions.    Return in about 9 weeks (around 9/2/2021) for Toenail Care., or sooner if acute issues arise.    This document has been electronically signed by Toby Gonzalez DPM on July 1, 2021 13:40 EDT

## 2021-07-02 VITALS
BODY MASS INDEX: 40.35 KG/M2 | DIASTOLIC BLOOD PRESSURE: 61 MMHG | HEART RATE: 60 BPM | SYSTOLIC BLOOD PRESSURE: 132 MMHG | WEIGHT: 288.2 LBS | HEIGHT: 71 IN | TEMPERATURE: 97.6 F

## 2021-07-02 VITALS
HEART RATE: 68 BPM | DIASTOLIC BLOOD PRESSURE: 60 MMHG | HEIGHT: 71 IN | BODY MASS INDEX: 41.07 KG/M2 | WEIGHT: 293.4 LBS | SYSTOLIC BLOOD PRESSURE: 133 MMHG | TEMPERATURE: 97.3 F

## 2021-07-02 VITALS
WEIGHT: 308.8 LBS | BODY MASS INDEX: 43.23 KG/M2 | SYSTOLIC BLOOD PRESSURE: 140 MMHG | HEART RATE: 60 BPM | DIASTOLIC BLOOD PRESSURE: 64 MMHG | TEMPERATURE: 97.1 F | HEIGHT: 71 IN

## 2021-07-02 VITALS
SYSTOLIC BLOOD PRESSURE: 157 MMHG | HEIGHT: 71 IN | HEART RATE: 67 BPM | DIASTOLIC BLOOD PRESSURE: 63 MMHG | BODY MASS INDEX: 42 KG/M2 | WEIGHT: 300 LBS | TEMPERATURE: 98.1 F

## 2021-07-02 VITALS
BODY MASS INDEX: 41.64 KG/M2 | DIASTOLIC BLOOD PRESSURE: 64 MMHG | SYSTOLIC BLOOD PRESSURE: 147 MMHG | TEMPERATURE: 97.1 F | HEART RATE: 64 BPM | WEIGHT: 297.4 LBS | HEIGHT: 71 IN

## 2021-07-02 VITALS
HEIGHT: 71 IN | HEART RATE: 63 BPM | DIASTOLIC BLOOD PRESSURE: 68 MMHG | SYSTOLIC BLOOD PRESSURE: 156 MMHG | TEMPERATURE: 97.5 F | BODY MASS INDEX: 42.28 KG/M2 | WEIGHT: 302 LBS

## 2021-07-02 VITALS
WEIGHT: 310.4 LBS | SYSTOLIC BLOOD PRESSURE: 114 MMHG | HEIGHT: 71 IN | HEART RATE: 60 BPM | DIASTOLIC BLOOD PRESSURE: 41 MMHG | BODY MASS INDEX: 43.45 KG/M2 | TEMPERATURE: 95.9 F

## 2021-07-02 VITALS
SYSTOLIC BLOOD PRESSURE: 138 MMHG | DIASTOLIC BLOOD PRESSURE: 55 MMHG | TEMPERATURE: 97.3 F | HEIGHT: 71 IN | HEART RATE: 60 BPM | BODY MASS INDEX: 43.43 KG/M2

## 2021-07-02 VITALS
WEIGHT: 307 LBS | HEIGHT: 71 IN | HEART RATE: 60 BPM | TEMPERATURE: 96.2 F | SYSTOLIC BLOOD PRESSURE: 144 MMHG | DIASTOLIC BLOOD PRESSURE: 60 MMHG | BODY MASS INDEX: 42.98 KG/M2

## 2021-07-13 RX ORDER — PYRAZINAMIDE 500 MG/1
1 TABLET ORAL 3 TIMES DAILY
Qty: 90 TABLET | Refills: 0 | Status: SHIPPED | OUTPATIENT
Start: 2021-07-13 | End: 2021-08-26 | Stop reason: SDUPTHER

## 2021-07-28 RX ORDER — AMLODIPINE BESYLATE 10 MG/1
TABLET ORAL
Qty: 90 TABLET | Refills: 3 | Status: SHIPPED | OUTPATIENT
Start: 2021-07-28 | End: 2021-09-15 | Stop reason: DRUGHIGH

## 2021-07-28 RX ORDER — ALLOPURINOL 300 MG/1
TABLET ORAL
Qty: 90 TABLET | Refills: 3 | Status: SHIPPED | OUTPATIENT
Start: 2021-07-28 | End: 2021-09-15 | Stop reason: SDUPTHER

## 2021-07-28 RX ORDER — ATORVASTATIN CALCIUM 20 MG/1
TABLET, FILM COATED ORAL
Qty: 90 TABLET | Refills: 3 | Status: SHIPPED | OUTPATIENT
Start: 2021-07-28 | End: 2021-09-15 | Stop reason: SDUPTHER

## 2021-08-26 RX ORDER — PYRAZINAMIDE 500 MG/1
1 TABLET ORAL 3 TIMES DAILY
Qty: 90 TABLET | Refills: 0 | Status: SHIPPED | OUTPATIENT
Start: 2021-08-26 | End: 2021-09-29 | Stop reason: SDUPTHER

## 2021-08-26 NOTE — TELEPHONE ENCOUNTER
Rx Refill Note  Requested Prescriptions     Pending Prescriptions Disp Refills   • acetaminophen-codeine (TYLENOL with CODEINE #3) 300-30 MG per tablet 90 tablet 0     Sig: Take 1 tablet by mouth 3 (Three) Times a Day.      Last office visit with prescribing clinician: 05/14/2021     Next office visit with prescribing clinician: 09/15/2021  LAST FILLED 7/14/21#90  UDS-9/2/20  Marifer Macias LPN  08/26/21, 13:07 EDT

## 2021-09-02 RX ORDER — METOPROLOL SUCCINATE 200 MG/1
200 TABLET, EXTENDED RELEASE ORAL DAILY
Qty: 90 TABLET | Refills: 0 | Status: SHIPPED | OUTPATIENT
Start: 2021-09-02 | End: 2021-09-15 | Stop reason: SDUPTHER

## 2021-09-02 NOTE — TELEPHONE ENCOUNTER
Caller: Jonas Mercado Gene    Relationship: Self    Best call back number: [phone]    Medication needed:   Requested Prescriptions     Pending Prescriptions Disp Refills   • metoprolol succinate XL (TOPROL-XL) 200 MG 24 hr tablet       Sig: Take 1 tablet by mouth Daily.       When do you need the refill by: ASAP    What additional details did the patient provide when requesting the medication:     Does the patient have less than a 3 day supply:  [] Yes  [x] No    What is the patient's preferred pharmacy: [facility] - [address] [phone] [facility] [phone]

## 2021-09-09 ENCOUNTER — TELEPHONE (OUTPATIENT)
Dept: FAMILY MEDICINE CLINIC | Age: 71
End: 2021-09-09

## 2021-09-09 NOTE — TELEPHONE ENCOUNTER
Caller: Jonas Mercado Gene    Relationship: Self    Best call back number: [phone]    Medication needed:   Requested Prescriptions     Pending Prescriptions Disp Refills   • metFORMIN (GLUCOPHAGE) 1000 MG tablet       Sig: Take 1 tablet by mouth 2 (Two) Times a Day With Meals.       When do you need the refill by: ASAP    What additional details did the patient provide when requesting the medication: HAS ABOUT 6 DAYS WORTH  Does the patient have less than a 3 day supply:  [] Yes  [x] No    What is the patient's preferred pharmacy: [facility] - [address] [phone] [facility] [phone]

## 2021-09-15 ENCOUNTER — OFFICE VISIT (OUTPATIENT)
Dept: FAMILY MEDICINE CLINIC | Age: 71
End: 2021-09-15

## 2021-09-15 ENCOUNTER — LAB (OUTPATIENT)
Dept: LAB | Facility: HOSPITAL | Age: 71
End: 2021-09-15

## 2021-09-15 VITALS
DIASTOLIC BLOOD PRESSURE: 71 MMHG | TEMPERATURE: 98.3 F | HEART RATE: 69 BPM | HEIGHT: 72 IN | BODY MASS INDEX: 41.58 KG/M2 | WEIGHT: 307 LBS | SYSTOLIC BLOOD PRESSURE: 145 MMHG

## 2021-09-15 DIAGNOSIS — E11.65 TYPE 2 DIABETES MELLITUS WITH HYPERGLYCEMIA, WITHOUT LONG-TERM CURRENT USE OF INSULIN (HCC): ICD-10-CM

## 2021-09-15 DIAGNOSIS — I89.0 LYMPHEDEMA: ICD-10-CM

## 2021-09-15 DIAGNOSIS — M43.17 SPONDYLOLISTHESIS AT L5-S1 LEVEL: ICD-10-CM

## 2021-09-15 DIAGNOSIS — M10.9 GOUT, UNSPECIFIED CAUSE, UNSPECIFIED CHRONICITY, UNSPECIFIED SITE: ICD-10-CM

## 2021-09-15 DIAGNOSIS — E78.2 MIXED HYPERLIPIDEMIA: ICD-10-CM

## 2021-09-15 DIAGNOSIS — I10 ESSENTIAL HYPERTENSION: ICD-10-CM

## 2021-09-15 DIAGNOSIS — Z79.899 HIGH RISK MEDICATION USE: ICD-10-CM

## 2021-09-15 DIAGNOSIS — E11.65 TYPE 2 DIABETES MELLITUS WITH HYPERGLYCEMIA, WITHOUT LONG-TERM CURRENT USE OF INSULIN (HCC): Primary | ICD-10-CM

## 2021-09-15 PROBLEM — M20.40 HAMMERTOE: Status: ACTIVE | Noted: 2021-09-15

## 2021-09-15 PROBLEM — I26.99 PULMONARY EMBOLISM (HCC): Status: ACTIVE | Noted: 2021-09-15

## 2021-09-15 PROBLEM — M43.10 SPONDYLOLISTHESIS: Status: ACTIVE | Noted: 2020-06-16

## 2021-09-15 LAB
ALBUMIN SERPL-MCNC: 3.9 G/DL (ref 3.5–5.2)
ALBUMIN UR-MCNC: 5.2 MG/DL
ALBUMIN/GLOB SERPL: 1.3 G/DL
ALP SERPL-CCNC: 104 U/L (ref 39–117)
ALT SERPL W P-5'-P-CCNC: 12 U/L (ref 1–41)
AMPHET+METHAMPHET UR QL: NEGATIVE
AMPHETAMINES UR QL: NEGATIVE
ANION GAP SERPL CALCULATED.3IONS-SCNC: 9.2 MMOL/L (ref 5–15)
AST SERPL-CCNC: 10 U/L (ref 1–40)
BARBITURATES UR QL SCN: NEGATIVE
BASOPHILS # BLD AUTO: 0.1 10*3/MM3 (ref 0–0.2)
BASOPHILS NFR BLD AUTO: 0.7 % (ref 0–1.5)
BENZODIAZ UR QL SCN: NEGATIVE
BILIRUB SERPL-MCNC: 0.3 MG/DL (ref 0–1.2)
BUN SERPL-MCNC: 22 MG/DL (ref 8–23)
BUN/CREAT SERPL: 19.5 (ref 7–25)
BUPRENORPHINE SERPL-MCNC: NEGATIVE NG/ML
CALCIUM SPEC-SCNC: 9.2 MG/DL (ref 8.6–10.5)
CANNABINOIDS SERPL QL: NEGATIVE
CHLORIDE SERPL-SCNC: 99 MMOL/L (ref 98–107)
CHOLEST SERPL-MCNC: 133 MG/DL (ref 0–200)
CO2 SERPL-SCNC: 28.8 MMOL/L (ref 22–29)
COCAINE UR QL: NEGATIVE
CREAT SERPL-MCNC: 1.13 MG/DL (ref 0.76–1.27)
CREAT UR-MCNC: 91 MG/DL
DEPRECATED RDW RBC AUTO: 49.2 FL (ref 37–54)
EOSINOPHIL # BLD AUTO: 0.14 10*3/MM3 (ref 0–0.4)
EOSINOPHIL NFR BLD AUTO: 1 % (ref 0.3–6.2)
ERYTHROCYTE [DISTWIDTH] IN BLOOD BY AUTOMATED COUNT: 14.7 % (ref 12.3–15.4)
EXPIRATION DATE: ABNORMAL
GFR SERPL CREATININE-BSD FRML MDRD: 64 ML/MIN/1.73
GLOBULIN UR ELPH-MCNC: 3 GM/DL
GLUCOSE SERPL-MCNC: 129 MG/DL (ref 65–99)
HBA1C MFR BLD: 7.7 % (ref 4.8–5.6)
HCT VFR BLD AUTO: 47 % (ref 37.5–51)
HDLC SERPL-MCNC: 51 MG/DL (ref 40–60)
HGB BLD-MCNC: 15.4 G/DL (ref 13–17.7)
IMM GRANULOCYTES # BLD AUTO: 0.08 10*3/MM3 (ref 0–0.05)
IMM GRANULOCYTES NFR BLD AUTO: 0.6 % (ref 0–0.5)
LDLC SERPL CALC-MCNC: 65 MG/DL (ref 0–100)
LDLC/HDLC SERPL: 1.25 {RATIO}
LYMPHOCYTES # BLD AUTO: 3.6 10*3/MM3 (ref 0.7–3.1)
LYMPHOCYTES NFR BLD AUTO: 25.8 % (ref 19.6–45.3)
Lab: ABNORMAL
MCH RBC QN AUTO: 29.7 PG (ref 26.6–33)
MCHC RBC AUTO-ENTMCNC: 32.8 G/DL (ref 31.5–35.7)
MCV RBC AUTO: 90.6 FL (ref 79–97)
MDMA UR QL SCN: NEGATIVE
METHADONE UR QL SCN: NEGATIVE
MICROALBUMIN/CREAT UR: 57.1 MG/G
MONOCYTES # BLD AUTO: 1.14 10*3/MM3 (ref 0.1–0.9)
MONOCYTES NFR BLD AUTO: 8.2 % (ref 5–12)
NEUTROPHILS NFR BLD AUTO: 63.7 % (ref 42.7–76)
NEUTROPHILS NFR BLD AUTO: 8.89 10*3/MM3 (ref 1.7–7)
OPIATES UR QL: POSITIVE
OXYCODONE UR QL SCN: NEGATIVE
PCP UR QL SCN: NEGATIVE
PLATELET # BLD AUTO: 255 10*3/MM3 (ref 140–450)
PMV BLD AUTO: 11.4 FL (ref 6–12)
POTASSIUM SERPL-SCNC: 3.8 MMOL/L (ref 3.5–5.2)
PROT SERPL-MCNC: 6.9 G/DL (ref 6–8.5)
RBC # BLD AUTO: 5.19 10*6/MM3 (ref 4.14–5.8)
SODIUM SERPL-SCNC: 137 MMOL/L (ref 136–145)
TRIGL SERPL-MCNC: 90 MG/DL (ref 0–150)
VLDLC SERPL-MCNC: 17 MG/DL (ref 5–40)
WBC # BLD AUTO: 13.95 10*3/MM3 (ref 3.4–10.8)

## 2021-09-15 PROCEDURE — 36415 COLL VENOUS BLD VENIPUNCTURE: CPT

## 2021-09-15 PROCEDURE — 80305 DRUG TEST PRSMV DIR OPT OBS: CPT | Performed by: NURSE PRACTITIONER

## 2021-09-15 PROCEDURE — 82570 ASSAY OF URINE CREATININE: CPT

## 2021-09-15 PROCEDURE — 99214 OFFICE O/P EST MOD 30 MIN: CPT | Performed by: NURSE PRACTITIONER

## 2021-09-15 PROCEDURE — 80061 LIPID PANEL: CPT

## 2021-09-15 PROCEDURE — 80053 COMPREHEN METABOLIC PANEL: CPT

## 2021-09-15 PROCEDURE — 83036 HEMOGLOBIN GLYCOSYLATED A1C: CPT

## 2021-09-15 PROCEDURE — 82043 UR ALBUMIN QUANTITATIVE: CPT

## 2021-09-15 PROCEDURE — 85025 COMPLETE CBC W/AUTO DIFF WBC: CPT

## 2021-09-15 RX ORDER — LISINOPRIL 40 MG/1
40 TABLET ORAL DAILY
Qty: 90 TABLET | Refills: 0 | Status: SHIPPED | OUTPATIENT
Start: 2021-09-15

## 2021-09-15 RX ORDER — ATORVASTATIN CALCIUM 20 MG/1
20 TABLET, FILM COATED ORAL DAILY
Qty: 90 TABLET | Refills: 0 | Status: SHIPPED | OUTPATIENT
Start: 2021-09-15

## 2021-09-15 RX ORDER — GLIPIZIDE 5 MG/1
TABLET ORAL
Qty: 270 TABLET | Refills: 0 | Status: SHIPPED | OUTPATIENT
Start: 2021-09-15 | End: 2023-03-22 | Stop reason: SDUPTHER

## 2021-09-15 RX ORDER — HYDROCHLOROTHIAZIDE 25 MG/1
25 TABLET ORAL DAILY
Qty: 90 TABLET | Refills: 0 | Status: SHIPPED | OUTPATIENT
Start: 2021-09-15 | End: 2022-10-06 | Stop reason: ALTCHOICE

## 2021-09-15 RX ORDER — LIDOCAINE 50 MG/G
PATCH TOPICAL
COMMUNITY
Start: 2021-08-12 | End: 2021-09-25 | Stop reason: SDUPTHER

## 2021-09-15 RX ORDER — AMLODIPINE BESYLATE 5 MG/1
5 TABLET ORAL DAILY
Qty: 90 TABLET | Refills: 0 | Status: SHIPPED | OUTPATIENT
Start: 2021-09-15 | End: 2021-11-24

## 2021-09-15 RX ORDER — ALLOPURINOL 300 MG/1
300 TABLET ORAL DAILY
Qty: 90 TABLET | Refills: 0 | Status: SHIPPED | OUTPATIENT
Start: 2021-09-15

## 2021-09-15 RX ORDER — METOPROLOL SUCCINATE 200 MG/1
200 TABLET, EXTENDED RELEASE ORAL DAILY
Qty: 90 TABLET | Refills: 0 | Status: SHIPPED | OUTPATIENT
Start: 2021-09-15

## 2021-09-15 RX ORDER — CYCLOBENZAPRINE HCL 10 MG
10 TABLET ORAL 2 TIMES DAILY PRN
COMMUNITY
Start: 2021-08-24 | End: 2022-07-07 | Stop reason: ALTCHOICE

## 2021-09-15 NOTE — PROGRESS NOTES
Chief Complaint  Diabetes (medication refills) and Hyperlipidemia    Subjective  Jonas is a 70-year-old male here today for follow-up on type 2 diabetes.  His last hemoglobin A1c was 8.1% in May.  He is taking Metformin 1000 mg twice daily and glipizide 5 mg tablet 2 tablets in the morning and 1 tablet in the evening.  He had an eye exam last week and is noted to have a new onset left cataract related to his diabetes and Dr. Georgina Stinson is set him up to see further eye specialist on September 28.  He does check blood sugars at home and fasting blood sugars are generally in the 120s.  Regarding chronic back pain he sees Dr. Vasques  with [facility] whom he will see on Friday for discussion for possible second surgical procedure for his spine pain which is chronic.  He has been getting refills of Tylenol 3 and Lyrica from Dr. Desouza.  He states [facility] told him that once he gets refills of Tylenol 3 and Lyrica from one office he has to remain getting refills from that same office.  He is not in need of Tylenol 3 refills today but does need Lyrica refilled as it is beneficial when he denies side effects.  Regarding lymphedema he did see physical therapy and durable medical equipment pending named bio tab needs further documentation for him to get an inflatable device to wear on his legs to help assist his circulation.  Lasix never helped in the past so he has discontinued use of Lasix.  He states his blood pressure is under good control on hydrochlorothiazide 25 mg once daily.  Metoprolol 200 mg extended release once daily.  Lisinopril 40 mg once daily and amlodipine 5 mg once daily.  He denies side effects and requests refills.        Jonas Mercado presents to [facility]    Review of Systems   Constitutional: Negative.    HENT: Negative.    Respiratory: Negative.    Cardiovascular: Positive for leg swelling.        Sure for durable medical  "equipment for an inflatable device to wear on his legs for lymphedema   Gastrointestinal: Negative.    Musculoskeletal: Positive for back pain.   Psychiatric/Behavioral: Negative.         Objective   Vital Signs:   Vitals:    09/15/21 1424   BP: 145/71   BP Location: Left arm   Patient Position: Sitting   Cuff Size: Large Adult   Pulse: 69   Temp: 98.3 °F (36.8 °C)   TempSrc: Oral   Weight: (!) 139 kg (307 lb)   Height: 182.9 cm (72\")      Physical Exam  Vitals reviewed.   Constitutional:       General: He is not in acute distress.     Appearance: Normal appearance. He is well-developed. He is obese.   Neck:      Thyroid: No thyroid mass, thyromegaly or thyroid tenderness.   Cardiovascular:      Rate and Rhythm: Normal rate and regular rhythm.      Pulses:           Posterior tibial pulses are 1+ on the right side and 1+ on the left side.      Heart sounds: Normal heart sounds.   Pulmonary:      Effort: Pulmonary effort is normal.      Breath sounds: Normal breath sounds.   Musculoskeletal:      Right lower le+ Edema present.      Left lower le+ Edema present.   Skin:     General: Skin is warm and dry.   Neurological:      General: No focal deficit present.      Mental Status: He is alert.   Psychiatric:         Attention and Perception: Attention normal.         Mood and Affect: Mood and affect normal.         Behavior: Behavior normal.          Result Review :                Assessment and Plan    Diagnoses and all orders for this visit:    1. Type 2 diabetes mellitus with hyperglycemia, without long-term current use of insulin (CMS/[facility]) (Primary)  Assessment & Plan:  Advise annual eye exam.  A lower sugar lower carbohydrate diet and exercise.    Orders:  -     CBC and Differential; Future  -     Comprehensive metabolic panel; Future  -     Hemoglobin A1c; Future  -     Lipid panel; Future  -     Microalbumin / Creatinine Urine Ratio - Urine, Clean Catch; Future  -     glipizide (GLUCOTROL) 5 MG tablet; 2 " tabs in AM & 1 tab in pm  Dispense: 270 tablet; Refill: 0  -     metFORMIN (GLUCOPHAGE) 1000 MG tablet; Take 1 tablet by mouth 2 (Two) Times a Day With Meals.  Dispense: 180 tablet; Refill: 0    2. Mixed hyperlipidemia  Assessment & Plan:  Continue current treatment and a lower cholesterol diet and exercise.  Follow-up in 3 months or sooner if concerns    Orders:  -     atorvastatin (LIPITOR) 20 MG tablet; Take 1 tablet by mouth Daily.  Dispense: 90 tablet; Refill: 0    3. Essential hypertension  Assessment & Plan:  Monitor blood pressure at home.  Report any uncontrolled blood pressure readings.  Continue current treatment    Orders:  -     amLODIPine (NORVASC) 5 MG tablet; Take 1 tablet by mouth Daily.  Dispense: 90 tablet; Refill: 0  -     hydroCHLOROthiazide (HYDRODIURIL) 25 MG tablet; Take 1 tablet by mouth Daily.  Dispense: 90 tablet; Refill: 0  -     lisinopril (PRINIVIL,ZESTRIL) 40 MG tablet; Take 1 tablet by mouth Daily.  Dispense: 90 tablet; Refill: 0  -     metoprolol succinate XL (TOPROL-XL) 200 MG 24 hr tablet; Take 1 tablet by mouth Daily.  Dispense: 90 tablet; Refill: 0    4. Lymphedema  Assessment & Plan:  Patient states that he needs paperwork completed for [employer] for lymphedema device for use in the home.  Need to call Latasha Brush at [phone].      5. High risk medication use  Assessment & Plan:  I have contacted [facility] at [phone] and spoke to Kenia.  Advise that we are short staffed with physicians in our office currently.  Nurse practitioners are unable to prescribe controlled medication such as Tylenol 3 long-term.  Request that their office consider prescribing Tylenol 3 or any equivalent they deem appropriate from this point on.  Jonas is still a current patient with [facility] and will be meeting later this week to discuss surgical options.  Kenia states she will provide a message to the provider and will have them contact our office with  her decision if they can assist with his pain management prescription.  I would be able to continue prescribing Lyrica if they deem appropriate.  However it can be beneficial to the patient to have 1 provider prescribe all controlled medications.    Orders:  -     POC Urine Drug Screen [facility]    6. Gout, unspecified cause, unspecified chronicity, unspecified site  -     allopurinol (ZYLOPRIM) 300 MG tablet; Take 1 tablet by mouth Daily.  Dispense: 90 tablet; Refill: 0    7. Spondylolisthesis at L5-S1 level  -     pregabalin (LYRICA) 150 MG capsule; Take 1 capsule by mouth 2 (Two) Times a Day.  Dispense: 180 capsule; Refill: 0      Follow Up    Return in about 3 months (around 12/15/2021).  Patient was given instructions and counseling regarding his condition or for health maintenance advice. Please see specific information pulled into the AVS if appropriate.

## 2021-09-16 ENCOUNTER — DOCUMENTATION (OUTPATIENT)
Dept: FAMILY MEDICINE CLINIC | Age: 71
End: 2021-09-16

## 2021-09-16 RX ORDER — PREGABALIN 150 MG/1
150 CAPSULE ORAL 2 TIMES DAILY
Qty: 180 CAPSULE | Refills: 0 | Status: SHIPPED | OUTPATIENT
Start: 2021-09-16 | End: 2022-01-13 | Stop reason: SDUPTHER

## 2021-09-16 NOTE — ASSESSMENT & PLAN NOTE
Patient states that he needs paperwork completed for [employer] for lymphedema device for use in the home.  Need to call Latasha Brush at [phone].

## 2021-09-20 NOTE — PROGRESS NOTES
Diabetes improved but not yet to goal(hgb a1c 7% or below).  On metformin 1000 mg twice daily and glimepiride 5 mg -  2 tablets in the am and 1 tablet in the pm.  I recommend to increase glimepiride to 10 mg twice daily and report any low blood sugar episodes if they were to occur.  Follow up in 3 months to reevaluate Diabetes.  Let me know when you complete current supply of glimepiride and I will send in new prescription.  Cholesterol is normal.  White blood cell count is elevated but improved compared to one year ago.  We will continue to monitor.  Please report any sign if illness, fever, enlarged lymph nodes.

## 2021-09-23 ENCOUNTER — TELEPHONE (OUTPATIENT)
Dept: FAMILY MEDICINE CLINIC | Age: 71
End: 2021-09-23

## 2021-09-25 RX ORDER — LIDOCAINE 50 MG/G
1 PATCH TOPICAL EVERY 24 HOURS
Qty: 30 PATCH | Refills: 3 | Status: SHIPPED | OUTPATIENT
Start: 2021-09-25 | End: 2023-03-22 | Stop reason: SDUPTHER

## 2021-09-27 PROBLEM — M43.10 SPONDYLOLISTHESIS: Status: RESOLVED | Noted: 2020-06-16 | Resolved: 2021-09-27

## 2021-09-27 NOTE — ASSESSMENT & PLAN NOTE
Monitor blood pressure at home.  Report any uncontrolled blood pressure readings.  Continue current treatment

## 2021-09-27 NOTE — ASSESSMENT & PLAN NOTE
I have contacted [facility] at [phone] and spoke to Kenia.  Advise that we are short staffed with physicians in our office currently.  Nurse practitioners are unable to prescribe controlled medication such as Tylenol 3 long-term.  Request that their office consider prescribing Tylenol 3 or any equivalent they deem appropriate from this point on.  Jonas is still a current patient with [facility] and will be meeting later this week to discuss surgical options.  Kenia states she will provide a message to the provider and will have them contact our office with her decision if they can assist with his pain management prescription.  I would be able to continue prescribing Lyrica if they deem appropriate.  However it can be beneficial to the patient to have 1 provider prescribe all controlled medications.

## 2021-09-27 NOTE — ASSESSMENT & PLAN NOTE
Continue current treatment and a lower cholesterol diet and exercise.  Follow-up in 3 months or sooner if concerns

## 2021-09-29 DIAGNOSIS — M79.671 FOOT PAIN, BILATERAL: ICD-10-CM

## 2021-09-29 DIAGNOSIS — M43.17 SPONDYLOLISTHESIS AT L5-S1 LEVEL: Primary | ICD-10-CM

## 2021-09-29 DIAGNOSIS — M79.672 FOOT PAIN, BILATERAL: ICD-10-CM

## 2021-10-01 NOTE — TELEPHONE ENCOUNTER
Please call [phone] [facility] spine [place].  I spoke to Kenia last week about my request for that office to prescribe tylenol #3 or other pain medication for this patient.  Nps are unable to prescribe long term and we are short staffed with physicians.

## 2021-10-01 NOTE — TELEPHONE ENCOUNTER
Spoke to Mehul Ulrich, the provider there has not addressed this yet, will call us back when they have a response

## 2021-10-01 NOTE — TELEPHONE ENCOUNTER
Per Shahriar at [facility] they do not prescribe pain medication except for a short time following surgery, states they can give the patient Meloxicam and Tizanidine.

## 2021-10-01 NOTE — TELEPHONE ENCOUNTER
Caller: Jonas Mercado Gene    Relationship: Self    Best call back number: [phone]    What is the best time to reach you: ANYTIME     Who are you requesting to speak with (clinical staff, provider,  specific staff member):   CLINICAL         What was the call regarding: PATIENT IS CALLING TO CHECK THE ORDER STATUS OF MEDICATION,     Do you require a callback: YES , ASAP       HUB WAS UNABLE TO WARM TRANSFER .      IF NOTES DOES NOT STATE HUB TO READ WHEN PATIENT IS CALLING INQUIRING ABOUT MESSAGES, HUB IS NOT ABLE TO READ.

## 2021-10-02 RX ORDER — PYRAZINAMIDE 500 MG/1
1 TABLET ORAL 3 TIMES DAILY
Qty: 10 TABLET | Refills: 0 | Status: SHIPPED | OUTPATIENT
Start: 2021-10-02 | End: 2021-12-08 | Stop reason: SDUPTHER

## 2021-10-05 DIAGNOSIS — M43.17 SPONDYLOLISTHESIS AT L5-S1 LEVEL: ICD-10-CM

## 2021-10-05 RX ORDER — PYRAZINAMIDE 500 MG/1
1 TABLET ORAL 3 TIMES DAILY
Qty: 10 TABLET | Refills: 0 | Status: CANCELLED | OUTPATIENT
Start: 2021-10-05

## 2021-10-13 ENCOUNTER — TELEPHONE (OUTPATIENT)
Dept: FAMILY MEDICINE CLINIC | Age: 71
End: 2021-10-13

## 2021-10-13 NOTE — TELEPHONE ENCOUNTER
HUB TO READ    Jasmin called in today in regards to this pt and needing a pump for his legs, they were checking the status on how much longer it will be before he can get this. Best call back number for ramandeep is [phone]

## 2021-10-14 NOTE — TELEPHONE ENCOUNTER
Per Christina, pt is needing a provider order SCD pump for feet and legs for Lymphodema, Dr Desouza started the process back in May and all paperwork was faxed over per 6/17/21 telephone note and insurance has approved pump, they are just needing an order,  at [employer] company is Rob Brush at phone number [phone]

## 2021-10-20 NOTE — TELEPHONE ENCOUNTER
Order for scd pump is prepared.  Also reviewed bp log and bp is under good control.  Please scan bp log into chart.

## 2021-11-23 ENCOUNTER — TELEPHONE (OUTPATIENT)
Dept: FAMILY MEDICINE CLINIC | Age: 71
End: 2021-11-23

## 2021-11-23 DIAGNOSIS — I10 ESSENTIAL HYPERTENSION: ICD-10-CM

## 2021-11-23 NOTE — TELEPHONE ENCOUNTER
Hub staff attempted to follow warm transfer process and was unsuccessful     Caller: MARLEN KAPLAN    Relationship to patient: Home Health    Best call back number: [phone]    Patient is needing: ROSAURA CALLING FOR VERBAL ORDERS FOR PT AND OT. PT LEAVING REHAB TODAY FOLLOWING A SPINAL FUSION    PLEASE LEAVE MESSAGE IF SHE DOES NOT ANSWER.

## 2021-11-24 RX ORDER — AMLODIPINE BESYLATE 5 MG/1
TABLET ORAL
Qty: 90 TABLET | Refills: 3 | Status: SHIPPED | OUTPATIENT
Start: 2021-11-24

## 2022-01-10 ENCOUNTER — HOSPITAL ENCOUNTER (EMERGENCY)
Dept: HOSPITAL 49 - FER | Age: 72
Discharge: HOME | End: 2022-01-10
Payer: MEDICARE

## 2022-01-10 DIAGNOSIS — I10: ICD-10-CM

## 2022-01-10 DIAGNOSIS — E11.9: ICD-10-CM

## 2022-01-10 DIAGNOSIS — M31.6: Primary | ICD-10-CM

## 2022-01-10 DIAGNOSIS — Z88.6: ICD-10-CM

## 2022-01-10 LAB
ALBUMIN SERPL-MCNC: 2.8 G/DL (ref 3.4–5)
ALKALINE PHOSHATASE: 101 U/L (ref 46–116)
ALT SERPL-CCNC: 12 U/L (ref 16–63)
AST: 8 U/L (ref 15–37)
BILIRUBIN - TOTAL: 0.3 MG/DL (ref 0.2–1)
BILIRUBIN: NEGATIVE MG/DL
BLOOD: NEGATIVE ERY/UL
BUN SERPL-MCNC: 11 MG/DL (ref 7–18)
BUN/CREAT RATIO (CALC): 12.2 RATIO
CHLORIDE: 102 MMOL/L (ref 98–107)
CLARITY UR: CLEAR
CO2 (BICARBONATE): 30 MMOL/L (ref 21–32)
COLOR: YELLOW
CREATININE: 0.9 MG/DL (ref 0.67–1.17)
GLOBULIN (CALCULATION): 4.2 G/DL
GLUCOSE (U): NORMAL MG/DL
GLUCOSE SERPL-MCNC: 126 MG/DL (ref 74–106)
LEUKOCYTES: NEGATIVE LEU/UL
NITRITE: NEGATIVE MG/DL
POTASSIUM: 3.5 MMOL/L (ref 3.5–5.1)
PROTEIN: (no result) MG/DL
SPECIFIC GRAVITY: 1.02 (ref 1–1.03)
TOTAL PROTEIN: 7 G/DL (ref 6.4–8.2)
UROBILINOGEN: 0.2 MG/DL (ref 0.2–1)

## 2022-01-13 DIAGNOSIS — M43.17 SPONDYLOLISTHESIS AT L5-S1 LEVEL: ICD-10-CM

## 2022-01-13 RX ORDER — PREGABALIN 150 MG/1
150 CAPSULE ORAL 2 TIMES DAILY
Qty: 180 CAPSULE | Refills: 0 | Status: SHIPPED | OUTPATIENT
Start: 2022-01-13 | End: 2022-01-20 | Stop reason: SDUPTHER

## 2022-01-14 ENCOUNTER — TRANSCRIBE ORDERS (OUTPATIENT)
Dept: VASCULAR SURGERY | Facility: HOSPITAL | Age: 72
End: 2022-01-14

## 2022-01-14 DIAGNOSIS — R70.0 ELEVATED SEDIMENTATION RATE: ICD-10-CM

## 2022-01-14 DIAGNOSIS — R51.9 NONINTRACTABLE HEADACHE, UNSPECIFIED CHRONICITY PATTERN, UNSPECIFIED HEADACHE TYPE: ICD-10-CM

## 2022-01-14 DIAGNOSIS — M31.6 TEMPORAL ARTERITIS: Primary | ICD-10-CM

## 2022-01-14 DIAGNOSIS — I79.8 OTHER DISORDERS OF ARTERIES, ARTERIOLES AND CAPILLARIES IN DISEASES CLASSIFIED ELSEWHERE: ICD-10-CM

## 2022-01-17 ENCOUNTER — HOSPITAL ENCOUNTER (OUTPATIENT)
Dept: CARDIOLOGY | Facility: HOSPITAL | Age: 72
Discharge: HOME | End: 2022-01-17
Admitting: SURGERY

## 2022-01-17 DIAGNOSIS — I79.8 OTHER DISORDERS OF ARTERIES, ARTERIOLES AND CAPILLARIES IN DISEASES CLASSIFIED ELSEWHERE: ICD-10-CM

## 2022-01-17 DIAGNOSIS — R70.0 ELEVATED SEDIMENTATION RATE: ICD-10-CM

## 2022-01-17 DIAGNOSIS — R51.9 NONINTRACTABLE HEADACHE, UNSPECIFIED CHRONICITY PATTERN, UNSPECIFIED HEADACHE TYPE: ICD-10-CM

## 2022-01-17 DIAGNOSIS — M31.6 TEMPORAL ARTERITIS: ICD-10-CM

## 2022-01-17 LAB
BH CV VAS TEMPORAL LEFT DIST EDV: 14 CM/S
BH CV VAS TEMPORAL LEFT DIST PSV: 54 CM/S
BH CV VAS TEMPORAL LEFT MID EDV: 14 CM/S
BH CV VAS TEMPORAL LEFT MID PSV: 60 CM/S
BH CV VAS TEMPORAL LEFT MID/DIST EDV: 10 CM/S
BH CV VAS TEMPORAL LEFT MID/DIST PSV: 56 CM/S
BH CV VAS TEMPORAL LEFT PROX EDV: 8 CM/S
BH CV VAS TEMPORAL LEFT PROX PSV: 66 CM/S
BH CV VAS TEMPORAL LEFT PROX/MID EDV: 9 CM/S
BH CV VAS TEMPORAL LEFT PROX/MID PSV: 56 CM/S
BH CV VAS TEMPORAL RIGHT DIST EDV: 6 CM/S
BH CV VAS TEMPORAL RIGHT DIST PSV: 32 CM/S
BH CV VAS TEMPORAL RIGHT MID EDV: 6 CM/S
BH CV VAS TEMPORAL RIGHT MID PSV: 35 CM/S
BH CV VAS TEMPORAL RIGHT MID/DIST EDV: 8 CM/S
BH CV VAS TEMPORAL RIGHT MID/DIST PSV: 36 CM/S
BH CV VAS TEMPORAL RIGHT PROX EDV: 10 CM/S
BH CV VAS TEMPORAL RIGHT PROX PSV: 79 CM/S
BH CV VAS TEMPORAL RIGHT PROX/MID EDV: 7 CM/S
BH CV VAS TEMPORAL RIGHT PROX/MID PSV: 68 CM/S
LEFT ARM BP: NORMAL MMHG
MAXIMAL PREDICTED HEART RATE: 149 BPM
RIGHT ARM BP: NORMAL MMHG
STRESS TARGET HR: 127 BPM

## 2022-01-17 PROCEDURE — 93880 EXTRACRANIAL BILAT STUDY: CPT

## 2022-01-17 PROCEDURE — 93880 EXTRACRANIAL BILAT STUDY: CPT | Performed by: SURGERY

## 2022-01-18 ENCOUNTER — PREP FOR SURGERY (OUTPATIENT)
Dept: OTHER | Facility: HOSPITAL | Age: 72
End: 2022-01-18

## 2022-01-18 DIAGNOSIS — M31.6 TEMPORAL ARTERITIS: Primary | ICD-10-CM

## 2022-01-18 RX ORDER — SODIUM CHLORIDE 0.9 % (FLUSH) 0.9 %
10 SYRINGE (ML) INJECTION AS NEEDED
Status: CANCELLED | OUTPATIENT
Start: 2022-01-18

## 2022-01-18 RX ORDER — CEFAZOLIN SODIUM 2 G/100ML
2 INJECTION, SOLUTION INTRAVENOUS ONCE
Status: CANCELLED | OUTPATIENT
Start: 2022-01-18 | End: 2022-01-18

## 2022-01-18 RX ORDER — SODIUM CHLORIDE 0.9 % (FLUSH) 0.9 %
10 SYRINGE (ML) INJECTION EVERY 12 HOURS SCHEDULED
Status: CANCELLED | OUTPATIENT
Start: 2022-01-18

## 2022-01-18 NOTE — PRE-PROCEDURE INSTRUCTIONS
PRE-OP INSTRUCTIONS REVIEWED WITH PATIENT: FASTING/BATHING/ARRIVAL PROCEDURES.  INSTRUCTED TO TAKE A.M. DAY OF SURGERY: TYLENOL #3 AS NEEDED, ALLOPURINOL, LYRICA, PREDNISONE, METOPROLOL,  ATORVASTATIN, AMLODIPINE  UNDERSTANDING VERBALIZED.

## 2022-01-19 ENCOUNTER — ANESTHESIA (OUTPATIENT)
Dept: PERIOP | Facility: HOSPITAL | Age: 72
End: 2022-01-19

## 2022-01-19 ENCOUNTER — HOSPITAL ENCOUNTER (OUTPATIENT)
Facility: HOSPITAL | Age: 72
Setting detail: HOSPITAL OUTPATIENT SURGERY
Discharge: HOME | End: 2022-01-19
Attending: SURGERY | Admitting: SURGERY

## 2022-01-19 ENCOUNTER — ANESTHESIA EVENT (OUTPATIENT)
Dept: PERIOP | Facility: HOSPITAL | Age: 72
End: 2022-01-19

## 2022-01-19 VITALS
TEMPERATURE: 98 F | SYSTOLIC BLOOD PRESSURE: 135 MMHG | HEIGHT: 70 IN | DIASTOLIC BLOOD PRESSURE: 80 MMHG | OXYGEN SATURATION: 95 % | RESPIRATION RATE: 18 BRPM | WEIGHT: 307.76 LBS | BODY MASS INDEX: 44.06 KG/M2 | HEART RATE: 68 BPM

## 2022-01-19 DIAGNOSIS — M31.6 TEMPORAL ARTERITIS: ICD-10-CM

## 2022-01-19 LAB
ANION GAP SERPL CALCULATED.3IONS-SCNC: 12.6 MMOL/L (ref 5–15)
BUN SERPL-MCNC: 22 MG/DL (ref 8–23)
BUN/CREAT SERPL: 21 (ref 7–25)
CALCIUM SPEC-SCNC: 8.9 MG/DL (ref 8.6–10.5)
CHLORIDE SERPL-SCNC: 98 MMOL/L (ref 98–107)
CO2 SERPL-SCNC: 25.4 MMOL/L (ref 22–29)
CREAT SERPL-MCNC: 1.05 MG/DL (ref 0.76–1.27)
DEPRECATED RDW RBC AUTO: 42.7 FL (ref 37–54)
ERYTHROCYTE [DISTWIDTH] IN BLOOD BY AUTOMATED COUNT: 13.6 % (ref 12.3–15.4)
GFR SERPL CREATININE-BSD FRML MDRD: 70 ML/MIN/1.73
GLUCOSE BLDC GLUCOMTR-MCNC: 231 MG/DL (ref 70–99)
GLUCOSE BLDC GLUCOMTR-MCNC: 293 MG/DL (ref 70–99)
GLUCOSE SERPL-MCNC: 266 MG/DL (ref 65–99)
HCT VFR BLD AUTO: 34.7 % (ref 37.5–51)
HGB BLD-MCNC: 11.5 G/DL (ref 13–17.7)
MCH RBC QN AUTO: 28.5 PG (ref 26.6–33)
MCHC RBC AUTO-ENTMCNC: 33.1 G/DL (ref 31.5–35.7)
MCV RBC AUTO: 85.9 FL (ref 79–97)
PLATELET # BLD AUTO: 336 10*3/MM3 (ref 140–450)
PMV BLD AUTO: 11.2 FL (ref 6–12)
POTASSIUM SERPL-SCNC: 4 MMOL/L (ref 3.5–5.2)
QT INTERVAL: 428 MS
RBC # BLD AUTO: 4.04 10*6/MM3 (ref 4.14–5.8)
SODIUM SERPL-SCNC: 136 MMOL/L (ref 136–145)
WBC NRBC COR # BLD: 15.84 10*3/MM3 (ref 3.4–10.8)

## 2022-01-19 PROCEDURE — 82962 GLUCOSE BLOOD TEST: CPT

## 2022-01-19 PROCEDURE — S0260 H&P FOR SURGERY: HCPCS | Performed by: SURGERY

## 2022-01-19 PROCEDURE — 88305 TISSUE EXAM BY PATHOLOGIST: CPT | Performed by: SURGERY

## 2022-01-19 PROCEDURE — 0 LIDOCAINE 1 % SOLUTION 20 ML VIAL: Performed by: SURGERY

## 2022-01-19 PROCEDURE — 37609 LIGATION/BX TEMPORAL ARTERY: CPT | Performed by: SURGERY

## 2022-01-19 PROCEDURE — 25010000002 MIDAZOLAM PER 1 MG: Performed by: ANESTHESIOLOGY

## 2022-01-19 PROCEDURE — 80048 BASIC METABOLIC PNL TOTAL CA: CPT | Performed by: SURGERY

## 2022-01-19 PROCEDURE — 85027 COMPLETE CBC AUTOMATED: CPT | Performed by: SURGERY

## 2022-01-19 PROCEDURE — 63710000001 INSULIN REGULAR HUMAN PER 5 UNITS: Performed by: ANESTHESIOLOGY

## 2022-01-19 PROCEDURE — 93005 ELECTROCARDIOGRAM TRACING: CPT | Performed by: SURGERY

## 2022-01-19 PROCEDURE — C1889 IMPLANT/INSERT DEVICE, NOC: HCPCS | Performed by: SURGERY

## 2022-01-19 PROCEDURE — 0 CEFAZOLIN IN DEXTROSE 2-4 GM/100ML-% SOLUTION: Performed by: SURGERY

## 2022-01-19 PROCEDURE — 25010000002 PROPOFOL 10 MG/ML EMULSION: Performed by: NURSE ANESTHETIST, CERTIFIED REGISTERED

## 2022-01-19 DEVICE — LIGACLIP MCA MULTIPLE CLIP APPLIERS, 20 SMALL CLIPS
Type: IMPLANTABLE DEVICE | Site: TEMPORAL LOBE | Status: FUNCTIONAL
Brand: LIGACLIP

## 2022-01-19 RX ORDER — ONDANSETRON 2 MG/ML
4 INJECTION INTRAMUSCULAR; INTRAVENOUS ONCE AS NEEDED
Status: DISCONTINUED | OUTPATIENT
Start: 2022-01-19 | End: 2022-01-19 | Stop reason: HOSPADM

## 2022-01-19 RX ORDER — LIDOCAINE HYDROCHLORIDE 20 MG/ML
INJECTION, SOLUTION INFILTRATION; PERINEURAL AS NEEDED
Status: DISCONTINUED | OUTPATIENT
Start: 2022-01-19 | End: 2022-01-19 | Stop reason: SURG

## 2022-01-19 RX ORDER — SODIUM CHLORIDE, SODIUM LACTATE, POTASSIUM CHLORIDE, CALCIUM CHLORIDE 600; 310; 30; 20 MG/100ML; MG/100ML; MG/100ML; MG/100ML
9 INJECTION, SOLUTION INTRAVENOUS CONTINUOUS PRN
Status: DISCONTINUED | OUTPATIENT
Start: 2022-01-19 | End: 2022-01-19 | Stop reason: HOSPADM

## 2022-01-19 RX ORDER — PROPOFOL 10 MG/ML
VIAL (ML) INTRAVENOUS AS NEEDED
Status: DISCONTINUED | OUTPATIENT
Start: 2022-01-19 | End: 2022-01-19 | Stop reason: SURG

## 2022-01-19 RX ORDER — PROMETHAZINE HYDROCHLORIDE 12.5 MG/1
25 TABLET ORAL ONCE AS NEEDED
Status: DISCONTINUED | OUTPATIENT
Start: 2022-01-19 | End: 2022-01-19 | Stop reason: HOSPADM

## 2022-01-19 RX ORDER — MAGNESIUM HYDROXIDE 1200 MG/15ML
LIQUID ORAL AS NEEDED
Status: DISCONTINUED | OUTPATIENT
Start: 2022-01-19 | End: 2022-01-19 | Stop reason: HOSPADM

## 2022-01-19 RX ORDER — MEPERIDINE HYDROCHLORIDE 25 MG/ML
12.5 INJECTION INTRAMUSCULAR; INTRAVENOUS; SUBCUTANEOUS
Status: DISCONTINUED | OUTPATIENT
Start: 2022-01-19 | End: 2022-01-19 | Stop reason: HOSPADM

## 2022-01-19 RX ORDER — SODIUM CHLORIDE 0.9 % (FLUSH) 0.9 %
10 SYRINGE (ML) INJECTION EVERY 12 HOURS SCHEDULED
Status: DISCONTINUED | OUTPATIENT
Start: 2022-01-19 | End: 2022-01-19 | Stop reason: HOSPADM

## 2022-01-19 RX ORDER — MIDAZOLAM HYDROCHLORIDE 1 MG/ML
2 INJECTION INTRAMUSCULAR; INTRAVENOUS ONCE
Status: COMPLETED | OUTPATIENT
Start: 2022-01-19 | End: 2022-01-19

## 2022-01-19 RX ORDER — ACETAMINOPHEN 500 MG
1000 TABLET ORAL ONCE
Status: COMPLETED | OUTPATIENT
Start: 2022-01-19 | End: 2022-01-19

## 2022-01-19 RX ORDER — HYDROCODONE BITARTRATE AND ACETAMINOPHEN 7.5; 325 MG/1; MG/1
1 TABLET ORAL EVERY 6 HOURS PRN
Qty: 10 TABLET | Refills: 0 | Status: SHIPPED | OUTPATIENT
Start: 2022-01-19 | End: 2022-04-07

## 2022-01-19 RX ORDER — CEFAZOLIN SODIUM 2 G/100ML
2 INJECTION, SOLUTION INTRAVENOUS ONCE
Status: COMPLETED | OUTPATIENT
Start: 2022-01-19 | End: 2022-01-19

## 2022-01-19 RX ORDER — PROMETHAZINE HYDROCHLORIDE 25 MG/1
25 SUPPOSITORY RECTAL ONCE AS NEEDED
Status: DISCONTINUED | OUTPATIENT
Start: 2022-01-19 | End: 2022-01-19 | Stop reason: HOSPADM

## 2022-01-19 RX ORDER — SODIUM CHLORIDE 0.9 % (FLUSH) 0.9 %
10 SYRINGE (ML) INJECTION AS NEEDED
Status: DISCONTINUED | OUTPATIENT
Start: 2022-01-19 | End: 2022-01-19 | Stop reason: HOSPADM

## 2022-01-19 RX ORDER — OXYCODONE HYDROCHLORIDE 5 MG/1
5 TABLET ORAL
Status: DISCONTINUED | OUTPATIENT
Start: 2022-01-19 | End: 2022-01-19 | Stop reason: HOSPADM

## 2022-01-19 RX ADMIN — CEFAZOLIN SODIUM 3 G: 2 INJECTION, SOLUTION INTRAVENOUS at 16:33

## 2022-01-19 RX ADMIN — SODIUM CHLORIDE, POTASSIUM CHLORIDE, SODIUM LACTATE AND CALCIUM CHLORIDE: 600; 310; 30; 20 INJECTION, SOLUTION INTRAVENOUS at 17:11

## 2022-01-19 RX ADMIN — PROPOFOL 200 MCG/KG/MIN: 10 INJECTION, EMULSION INTRAVENOUS at 16:32

## 2022-01-19 RX ADMIN — MIDAZOLAM 2 MG: 1 INJECTION INTRAMUSCULAR; INTRAVENOUS at 16:17

## 2022-01-19 RX ADMIN — INSULIN HUMAN 10 UNITS: 100 INJECTION, SOLUTION PARENTERAL at 14:47

## 2022-01-19 RX ADMIN — PROPOFOL 70 MG: 10 INJECTION, EMULSION INTRAVENOUS at 16:31

## 2022-01-19 RX ADMIN — LIDOCAINE HYDROCHLORIDE 100 MG: 20 INJECTION, SOLUTION INFILTRATION; PERINEURAL at 16:31

## 2022-01-19 RX ADMIN — SODIUM CHLORIDE, POTASSIUM CHLORIDE, SODIUM LACTATE AND CALCIUM CHLORIDE 9 ML/HR: 600; 310; 30; 20 INJECTION, SOLUTION INTRAVENOUS at 12:25

## 2022-01-19 RX ADMIN — ACETAMINOPHEN 1000 MG: 500 TABLET ORAL at 12:25

## 2022-01-19 NOTE — DISCHARGE INSTRUCTIONS
DISCHARGE INSTRUCTIONS  SURGICAL / AMBULATORY  PROCEDURES      ? For your surgery you had:  ? General anesthesia (you may have a sore throat for the first 24 hours)  ? IV sedation.  ? Local anesthesia  ? Monitored anesthesia Care  ? You received a medicated patch for nausea prevention today (behind your ear). It is recommended that you remove it 24-48 hours post-operatively. It must be removed within 72 hours.   ? You have received an anesthesia medication today that can cause hormonal forms of birth control to be ineffective. You should use a different form of birth control (to prevent pregnancy) for 7 days.   ? You may experience dizziness, drowsiness, or light-headedness for several hours following surgery/procedure.  ? Do not stay alone today or tonight.  ? Limit your activity for 24 hours.  ? Resume your diet slowly.  Follow whatever special dietary instructions you may have been given by your doctor.  ? You should not drive or operate machinery, drink alcohol, or sign legally binding documents for 24 hours or while you are taking pain medication.    NOTIFY YOUR DOCTOR IF YOU EXPERIENCE ANY OF THE FOLLOWING:  ? Temperature greater than 101 degrees Fahrenheit  ? Shaking Chills  ? Redness or excessive drainage from incision  ? Nausea, vomiting and/or pain that is not controlled by prescribed medications  ? Increase in bleeding or bleeding that is excessive  ? Unable to urinate in 6 hours after surgery  ? If unable to reach your doctor, please go to the closest Emergency Room  ? You may begin dressing changes:     [] in 24 hours   [] in 48 hours   [x] Other: leave open to air  ? You may shower or bathe tmrw .  ? Apply an ice pack 24-48 hours.  ? May wash area and shampoo in 2 days.  ? Follow-up in the office in 2 weeks, call for appointment.  ? Resume home diet.  ? Resume home medications.  ? No lifting greater than 15 pounds for 2 weeks.  ? Medications per physician instructions as indicated on Discharge  Medication Information Sheet.

## 2022-01-19 NOTE — ANESTHESIA PREPROCEDURE EVALUATION
Anesthesia Evaluation     Patient summary reviewed and Nursing notes reviewed   no history of anesthetic complications:  NPO Solid Status: > 8 hours  NPO Liquid Status: > 2 hours           Airway   Mallampati: III  TM distance: >3 FB  Neck ROM: full  No difficulty expected  Dental      Pulmonary - normal exam    breath sounds clear to auscultation  (+) pulmonary embolism (resolved), a smoker Former,   Cardiovascular - normal exam  Exercise tolerance: good (4-7 METS)    Rhythm: regular  Rate: normal    (+) hypertension, CAD, hyperlipidemia,       Neuro/Psych  (+) numbness,     GI/Hepatic/Renal/Endo    (+) morbid obesity,  diabetes mellitus,     Musculoskeletal     (+) back pain,   Abdominal    Substance History   (+) alcohol use,      OB/GYN negative ob/gyn ROS         Other   arthritis,                      Anesthesia Plan    ASA 3     general and MAC       Anesthetic plan, all risks, benefits, and alternatives have been provided, discussed and informed consent has been obtained with: patient.        CODE STATUS:

## 2022-01-19 NOTE — ANESTHESIA POSTPROCEDURE EVALUATION
Patient: Jonas Mercado    Procedure Summary     Date: 01/19/22 Room / Location: [facility] OR 05 / [facility] MAIN OR    Anesthesia Start: 1625 Anesthesia Stop: 1725    Procedure: Right temporal artery biopsy (Right Head) Diagnosis:       Temporal arteritis (HCC)      (Temporal arteritis (HCC) [M31.6])    Surgeons: Wilbert Jacob MD Provider: Jean Douglas MD    Anesthesia Type: general, MAC ASA Status: 3          Anesthesia Type: general, MAC    Vitals  Vitals Value Taken Time   /76 01/19/22 1745   Temp 36.8 °C (98.2 °F) 01/19/22 1723   Pulse 60 01/19/22 1748   Resp 18 01/19/22 1723   SpO2 94 % 01/19/22 1748   Vitals shown include unvalidated device data.        Post Anesthesia Care and Evaluation    Patient location during evaluation: bedside  Patient participation: complete - patient participated  Level of consciousness: awake, responsive to light touch, responsive to noxious stimuli, responsive to painful stimuli, responsive to physical stimuli and responsive to verbal stimuli  Pain score: 2  Pain management: adequate  Airway patency: patent  Anesthetic complications: No anesthetic complications  PONV Status: none  Cardiovascular status: acceptable and stable  Respiratory status: acceptable and nasal cannula  Hydration status: acceptable    Comments: An Anesthesiologist personally participated in the most demanding procedures (including induction and emergence if applicable) in the anesthesia plan, monitored the course of anesthesia administration at frequent intervals and remained physically present and available for immediate diagnosis and treatment of emergencies.

## 2022-01-19 NOTE — H&P
"[facility]   HISTORY AND PHYSICAL    Patient Name: Jonas Mercado  : 1950  MRN: [id number]  Primary Care Physician:  Bronwyn Smith APRN  Date of admission: (Not on file)    Subjective   Subjective     Chief Complaint: Temporal arteritis    HPI:    Jonas Mercado is a 71 y.o. male who presents with complaints of right eye pain and right sided headache.  The concern is that the patient may have temporal arteritis.  At this time for biopsy.    Review of Systems    Non contributory except for the History of Present Illness    Personal History     Past Medical History:   Diagnosis Date   • Arthritis    • Back pain    • Bilateral ankle pain     NEUROPATHY   • Corns and callus    • Coronary artery disease     \"STIFF HEART\" ON BB, NO CARDIOLOGIST, PCP (-)CP OR SOA   • Diabetes mellitus type 2, controlled (HCC)    • Hammertoe    • Heel pain    • Hyperlipemia    • Hypertension    • Left foot pain    • Lymphedema     NO PROBLEMS LATELY   • Numbness in feet    • Pulmonary embolism (HCC) 2013    SADDLE PE, RESOLVED       Past Surgical History:   Procedure Laterality Date   • BACK SURGERY  2021    S1-L1 FUSION (TOTAL OF 2 SURGERY)   • CARPAL TUNNEL RELEASE Right    • COLONOSCOPY     • CYST REMOVAL      BACK   • FOOT SURGERY Right     ACHELLIS TENDON/HAMMER TOE   • KNEE ARTHROSCOPY Right     DIAGNOSTIC       Family History: family history includes Heart disease in his brother; Stroke in his father. Otherwise pertinent FHx was reviewed and not pertinent to current issue.    Social History:  reports that he quit smoking about 23 years ago. His smoking use included cigarettes. He has never used smokeless tobacco. He reports previous alcohol use. He reports that he does not use drugs.    Home Medications:  No current facility-administered medications on file prior to encounter.     Current Outpatient Medications on File Prior to Encounter   Medication Sig   • acetaminophen-codeine (TYLENOL with CODEINE " #3) 300-30 MG per tablet Take 1 tablet by mouth every 8 (eight) hours.  Max Daily Amount: 3 tablets   • allopurinol (ZYLOPRIM) 300 MG tablet Take 1 tablet by mouth Daily. (Patient taking differently: Take 300 mg by mouth Every Morning.)   • amLODIPine (NORVASC) 5 MG tablet TAKE 1 TABLET DAILY (Patient taking differently: Take 5 mg by mouth Every Morning.)   • atorvastatin (LIPITOR) 20 MG tablet Take 1 tablet by mouth Daily. (Patient taking differently: Take 20 mg by mouth Every Morning.)   • Calcium Carbonate 500 MG chewable tablet Chew 3 tablets by mouth daily   • Cholecalciferol 250 MCG (18687 UT) capsule Take 2 capsules by mouth Daily.   • cyclobenzaprine (FLEXERIL) 10 MG tablet Take 10 mg by mouth 2 (Two) Times a Day As Needed.   • Diclofenac Sodium (Voltaren) 1 % gel gel Apply 2 g topically to the appropriate area as directed 4 (Four) Times a Day.   • glipizide (GLUCOTROL) 5 MG tablet 2 tabs in AM & 1 tab in pm   • hydroCHLOROthiazide (HYDRODIURIL) 25 MG tablet Take 1 tablet by mouth Daily. (Patient taking differently: Take 25 mg by mouth Every Morning.)   • HYDROcodone-acetaminophen (NORCO) 7.5-325 MG per tablet Take 1 tablet by mouth every 8 hours as needed for 15 days.   • HYDROcodone-acetaminophen (NORCO) 7.5-325 MG per tablet Take 1 tablet by mouth Every 4 (Four) Hours As Needed.   • lidocaine (LIDODERM) 5 % Place 1 patch on the skin as directed by provider Daily. Remove & Discard patch within 12 hours or as directed by MD   • lisinopril (PRINIVIL,ZESTRIL) 40 MG tablet Take 1 tablet by mouth Daily. (Patient taking differently: Take 40 mg by mouth Every Morning.)   • metFORMIN (GLUCOPHAGE) 1000 MG tablet Take 1 tablet by mouth 2 (Two) Times a Day With Meals. (Patient taking differently: Take 1,000 mg by mouth 2 (Two) Times a Day With Meals. INST PER ANESTHESIA)   • metoprolol succinate XL (TOPROL-XL) 200 MG 24 hr tablet Take 1 tablet by mouth Daily. (Patient taking differently: Take 200 mg by mouth Every  Morning.)   • predniSONE (DELTASONE) 20 MG tablet Take 3 tablets by mouth daily (Patient taking differently: Take 60 mg by mouth Every Morning. RX'D FOR TEMPORAL ARTERITIS)   • pregabalin (LYRICA) 150 MG capsule Take 1 capsule by mouth 2 (Two) Times a Day.   • sennosides-docusate (PERICOLACE) 8.6-50 MG per tablet Take 2 tablets by mouth daily for 7 days.          Allergies:  Allergies   Allergen Reactions   • Aspirin Anaphylaxis       Objective   Objective     Vitals:        Physical Exam   General: Alert, no acute distress.  Neck: Supple  Heart: Regular rate  Lungs: Clear  Abdomen: Benign  Extremities: Symmetric    Assessment/Plan   Assessment / Plan     Active Hospital Problems:  Active Hospital Problems    Diagnosis    • **Temporal arteritis (HCC)      Added automatically from request for surgery [id number]         There are no diagnoses linked to this encounter.    Assessment/plan:   Mr. Mercado is suspected of having temporal arteritis.  At this time the plan is to proceed with a right temporal artery biopsy.  I have discussed with him in detail the mechanics of the procedure, the indications, benefits, risks, alternatives, as well as potential complications to include but not limited to infection, bleeding, hematoma, transfusion, reoperation.  He appears to understand and desires to proceed.      Electronically signed by Wilbert Jacob MD, 01/19/22, 9:34 AM EST.

## 2022-01-19 NOTE — OP NOTE
TEMPORAL ARTERY BIOPSY  Procedure Report    Patient Name:  Jonas Mercado  YOB: 1950    Date of Surgery:  1/19/2022     Indications: Temporal arteritis    Pre-op Diagnosis:   Temporal arteritis (HCC) [M31.6]       Post-Op Diagnosis Codes:     * Temporal arteritis (HCC) [M31.6]    Procedure(s):  Right temporal artery biopsy    Staff:  Surgeon(s):  Wilbert Jacob MD         Anesthesia: Monitored Anesthesia Care    Estimated Blood Loss: 20 mL    Implants:    Implant Name Type Inv. Item Serial No.  Lot No. LRB No. Used Action   CLIPAPPLR M/ ENDO LIGACLIP 9 3/8IN SM - [id number] Implant CLIPAPPLR M/ ENDO LIGACLIP 9 3/8IN SM  [employer]  [employer] J [employer] J [id number] Right 1 Implanted       Specimen: Right temporal artery biopsy       Findings: Grossly normal right temporal artery    Complications: None    Description of Procedure: The patient was taken to the operating room and was placed in the supine position.  He was then given intravenous sedation by anesthesia and subsequently induced into general anesthesia via LMA.  He was then positioned with his head turned to the left.  The right temporal area was shaved.  He was then prepped and draped in the usual aseptic fashion.  A timeout was taken to confirm patient, procedure and laterality.  Local anesthesia was administered at the projected site of incision.  An incision was then made beginning about 1 cm anterior and superior to the tragus of the right ear.  The subcutaneous tissue was traversed using electrocautery.  Blunt and sharp dissection were then used to dissect down to the temporal fascia.  The temporal artery was identified and dissected proximally and distally over a segment of approximately 3 cm in length.  Both the proximal and distal ends were clipped.  A couple of branches were also clipped.  The intervening segment was then transected using Metzenbaum scissors and passed off the operative field as specimen.  The  wound was then inspected for hemostasis and hemostasis assured.  The wound was then approximated using 3-0 Vicryl in a running fashion for approximation of the subcutaneous tissue and dermis.  Dermabond was applied to the skin.  The patient tolerated the procedure well.            Wilbert Jacob MD     Date: 1/19/2022  Time: 17:23 EST

## 2022-01-20 DIAGNOSIS — M43.17 SPONDYLOLISTHESIS AT L5-S1 LEVEL: ICD-10-CM

## 2022-01-20 NOTE — TELEPHONE ENCOUNTER
[facility] and [facility] didn't get your prescription for his pregabalin.  He is asking if you can send it to our pharmacy here as he only has 1 pill left.  He will just get it from us

## 2022-01-21 LAB
CYTO UR: NORMAL
LAB AP CASE REPORT: NORMAL
LAB AP CLINICAL INFORMATION: NORMAL
PATH REPORT.FINAL DX SPEC: NORMAL
PATH REPORT.GROSS SPEC: NORMAL

## 2022-01-21 RX ORDER — PREGABALIN 150 MG/1
150 CAPSULE ORAL 2 TIMES DAILY
Qty: 180 CAPSULE | Refills: 0 | Status: SHIPPED | OUTPATIENT
Start: 2022-01-21

## 2022-01-22 ENCOUNTER — TELEPHONE (OUTPATIENT)
Dept: VASCULAR SURGERY | Facility: HOSPITAL | Age: 72
End: 2022-01-22

## 2022-01-22 NOTE — TELEPHONE ENCOUNTER
Biopsy showed no inflammation, it was normal.  Discuss with your doctor if he still wants you on the steroids.  Thank you.

## 2022-02-14 DIAGNOSIS — E11.65 TYPE 2 DIABETES MELLITUS WITH HYPERGLYCEMIA, WITHOUT LONG-TERM CURRENT USE OF INSULIN: ICD-10-CM

## 2022-04-07 ENCOUNTER — OFFICE VISIT (OUTPATIENT)
Dept: PODIATRY | Facility: CLINIC | Age: 72
End: 2022-04-07

## 2022-04-07 VITALS
HEART RATE: 73 BPM | TEMPERATURE: 96.8 F | SYSTOLIC BLOOD PRESSURE: 152 MMHG | HEIGHT: 70 IN | BODY MASS INDEX: 45.1 KG/M2 | WEIGHT: 315 LBS | OXYGEN SATURATION: 95 % | DIASTOLIC BLOOD PRESSURE: 83 MMHG

## 2022-04-07 DIAGNOSIS — M79.671 FOOT PAIN, BILATERAL: Primary | ICD-10-CM

## 2022-04-07 DIAGNOSIS — B35.1 ONYCHOMYCOSIS: ICD-10-CM

## 2022-04-07 DIAGNOSIS — E11.8 DIABETIC FOOT: ICD-10-CM

## 2022-04-07 DIAGNOSIS — E11.9 NON-INSULIN DEPENDENT TYPE 2 DIABETES MELLITUS: ICD-10-CM

## 2022-04-07 DIAGNOSIS — L60.0 ONYCHOCRYPTOSIS: ICD-10-CM

## 2022-04-07 DIAGNOSIS — M79.672 FOOT PAIN, BILATERAL: Primary | ICD-10-CM

## 2022-04-07 DIAGNOSIS — G62.9 NEUROPATHY: ICD-10-CM

## 2022-04-07 PROCEDURE — 11721 DEBRIDE NAIL 6 OR MORE: CPT | Performed by: PODIATRIST

## 2022-04-07 RX ORDER — UREA 10 %
LOTION (ML) TOPICAL
COMMUNITY
Start: 2022-01-13 | End: 2022-07-07 | Stop reason: ALTCHOICE

## 2022-04-07 NOTE — PROGRESS NOTES
"    [facility] - PODIATRY    Today's Date: 04/07/22    Patient Name: Jonas Mercado  MRN: [id number]  CSN: [id number]  PCP: Bronwyn Smith APRN, Last PCP Visit: 14 February 2022  Referring Provider: No ref. provider found    SUBJECTIVE     Chief Complaint   Patient presents with   • Left Foot - Nail Problem   • Right Foot - Nail Problem     HPI: Jonas Mercado, a 71 y.o.male, comes to clinic.    New, Established, New Problem:  established     Location:  Toenails    Duration:   Greater than five years    Onset:  Gradual    Nature:  sore with palpation.    Stable, worsening, improving:   Stable    Aggravating factors:  Pain with shoe gear and ambulation.    Previous Treatment:  debridement  __________________________________    NIDDM    Patient reported last blood glucose: 129  __________________________________    Patient reports the following medical changes since their last visit: low back fusion.    Patient denies any fevers, chills, nausea, vomiting, shortness of breathe, nor any other constitutional signs nor symptoms.       Past Medical History:   Diagnosis Date   • Arthritis    • Back pain    • Bilateral ankle pain     NEUROPATHY   • Corns and callus    • Coronary artery disease     \"STIFF HEART\" ON BB, NO CARDIOLOGIST, PCP (-)CP OR SOA   • Diabetes mellitus type 2, controlled (HCC)    • Hammertoe    • Heel pain    • Hyperlipemia    • Hypertension    • Left foot pain    • Lymphedema     NO PROBLEMS LATELY   • Numbness in feet    • Pulmonary embolism (HCC) 2013    SADDLE PE, RESOLVED     Past Surgical History:   Procedure Laterality Date   • BACK SURGERY  11/2021    S1-L1 FUSION (TOTAL OF 2 SURGERY)   • CARPAL TUNNEL RELEASE Right    • COLONOSCOPY  2014   • CYST REMOVAL      BACK   • FOOT SURGERY Right     ACHELLIS TENDON/HAMMER TOE   • KNEE ARTHROSCOPY Right     DIAGNOSTIC   • TEMPORAL ARTERY BIOPSY Right 1/19/2022    Procedure: Right temporal artery biopsy;  Surgeon: Wilbert Jacob MD; "  Location: [facility] MAIN OR;  Service: Vascular;  Laterality: Right;     Family History   Problem Relation Age of Onset   • Stroke Father    • Heart disease Brother    • Malig Hyperthermia Neg Hx      Social History     Socioeconomic History   • Marital status:    Tobacco Use   • Smoking status: Former Smoker     Types: Cigarettes     Quit date: 1998     Years since quittin.1   • Smokeless tobacco: Never Used   Vaping Use   • Vaping Use: Never used   Substance and Sexual Activity   • Alcohol use: Not Currently     Comment: VERY RARE   • Drug use: Never   • Sexual activity: Defer     Allergies   Allergen Reactions   • Aspirin Anaphylaxis     Current Outpatient Medications   Medication Sig Dispense Refill   • acetaminophen-codeine (TYLENOL with CODEINE #3) 300-30 MG per tablet Take 1 tablet by mouth every 8 (eight) hours.  Max Daily Amount: 3 tablets 90 tablet 0   • allopurinol (ZYLOPRIM) 300 MG tablet Take 1 tablet by mouth Daily. (Patient taking differently: Take 300 mg by mouth Every Morning.) 90 tablet 0   • amLODIPine (NORVASC) 5 MG tablet TAKE 1 TABLET DAILY (Patient taking differently: Take 5 mg by mouth Every Morning.) 90 tablet 3   • atorvastatin (LIPITOR) 20 MG tablet Take 1 tablet by mouth Daily. (Patient taking differently: Take 20 mg by mouth Every Morning.) 90 tablet 0   • calcium carbonate (OS-STEPHAN) 1250 (500 Ca) MG chewable tablet      • cyclobenzaprine (FLEXERIL) 10 MG tablet Take 10 mg by mouth 2 (Two) Times a Day As Needed.     • Diclofenac Sodium (VOLTAREN) 1 % gel gel APPLY 2 GM OF GEL TO AFFECTED AREA 3 TIMES DAILY.  DO NOT APPLY MORE THAN 8 GM DAILY TO ANY ONE AFFECTED JOINT. 300 g 3   • glipizide (GLUCOTROL) 5 MG tablet 2 tabs in AM & 1 tab in pm 270 tablet 0   • hydroCHLOROthiazide (HYDRODIURIL) 25 MG tablet Take 1 tablet by mouth Daily. (Patient taking differently: Take 25 mg by mouth Every Morning.) 90 tablet 0   • lidocaine (LIDODERM) 5 % Place 1 patch on the skin as directed by  provider Daily. Remove & Discard patch within 12 hours or as directed by MD 30 patch 3   • lisinopril (PRINIVIL,ZESTRIL) 40 MG tablet Take 1 tablet by mouth Daily. (Patient taking differently: Take 40 mg by mouth Every Morning.) 90 tablet 0   • metoprolol succinate XL (TOPROL-XL) 200 MG 24 hr tablet Take 1 tablet by mouth Daily. (Patient taking differently: Take 200 mg by mouth Every Morning.) 90 tablet 0   • pregabalin (LYRICA) 150 MG capsule Take 1 capsule by mouth 2 (Two) Times a Day. 180 capsule 0   • Cholecalciferol 250 MCG (91967 UT) capsule Take 2 capsules by mouth Daily. 60 each 2     No current facility-administered medications for this visit.     Review of Systems   Constitutional: Negative.    Skin:        Toenails 1 through 5 bilaterally   Neurological: Positive for numbness.   All other systems reviewed and are negative.      OBJECTIVE     Vitals:    22 1244   BP: 152/83   Pulse: 73   Temp: 96.8 °F (36 °C)   SpO2: 95%       Lab Results   Component Value Date    HGBA1C 7.7 (H) 2021       Lab Results   Component Value Date    GLUCOSE 266 (H) 2022    CALCIUM 8.9 2022     2022    K 4.0 2022    CO2 25.4 2022    CL 98 2022    BUN 22 2022    CREATININE 1.05 2022    EGFRIFNONA 70 2022    BCR 21.0 2022    ANIONGAP 12.6 2022       Patient seen in no apparent distress.      PHYSICAL EXAM:     Foot/Ankle Exam:       General:   Appearance: obesity and elderly    Orientation: AAOx3    Affect: appropriate    Shoe Gear:  Casual shoes    VASCULAR      Right Foot Vascularity   Dorsalis pedis:  1+  Posterior tibial:  1+  Skin Temperature: cool    Edema Grading:  Pitting and 2+  CFT:  3  Pedal Hair Growth:  Absent  Varicosities: mild varicosities       Left Foot Vascularity   Dorsalis pedis:  1+  Posterior tibial:  1+  Skin Temperature: cool    Edema Gradin+ and pitting  CFT:  3  Pedal Hair Growth:  Absent  Varicosities: mild  varicosities        NEUROLOGIC     Right Foot Neurologic   Light touch sensation:  Diminished  Vibratory sensation:  Diminished  Hot/Cold sensation: diminished    Protective Sensation using [place] Monofilament:  Diminished     Left Foot Neurologic   Light touch sensation:  Diminished  Vibratory sensation:  Diminished  Hot/cold sensation: diminished    Protective Sensation using [place]yl Monofilament:  Diminished     MUSCLE STRENGTH     Right Foot Muscle Strength   Normal strength    Foot dorsiflexion:  5  Foot plantar flexion:  5  Foot inversion:  5  Foot eversion:  5     Left Foot Muscle Strength   Normal strength    Foot dorsiflexion:  5  Foot plantar flexion:  5  Foot inversion:  5  Foot eversion:  5     DERMATOLOGIC     Right Foot Dermatologic   Skin: skin intact    Nails: onychomycosis, abnormally thick, subungual debris, dystrophic nails and ingrown toenail    Nails comment:  Toenails 1 through 5     Left Foot Dermatologic   Skin: skin intact    Nails: onychomycosis, abnormally thick, subungual debris, dystrophic nails and ingrown toenail    Nails comment:  Toenails 1 through 5      ASSESSMENT/PLAN     Diagnoses and all orders for this visit:    1. Foot pain, bilateral (Primary)    2. Onychomycosis    3. Diabetic foot (HCC)    4. Onychocryptosis    5. Neuropathy    6. Non-insulin dependent type 2 diabetes mellitus (HCC)        Comprehensive lower extremity examination and evaluation was performed.    Discussed findings and treatment plan including risks, benefits, and treatment options with patient in detail. Patient agreed with treatment plan.    Toenails 1 through 5 bilaterally were debrided in thickness and length and then smoothed with a Dremel Tool.  Tolerated the procedure well without complications.    Diabetic foot exam performed and documented this date, compliant with M required standards. Detail of findings as noted in physical exam.  Lower extremity Neurologic exam for diabetic  patient performed and documented this date, compliant with PQRS required standards. Detail of findings as noted in physical exam.  Advised patient importance of good routine lower extremity hygiene. Advised patient importance of evaluating for intact skin and pain free nail borders.  Advised patient to use mirror to evaluate plantar/ soles of feet for better visualization. Advised patient monitor and phone office to be seen if any cracking to skin, open lesions, painful nail borders or if nails become elongated prior to next visit. Advised patient importance of daily cleansing of lower extremities, followed by good skin cream to maintain normal hydration of skin. Also advised patient importance of close daily monitoring of blood sugar. Advised to regulate diet and medications to maintain control of blood sugar in optimal range. Contact primary care provider if difficulties maintaining blood sugar levels.  Advised Patient of presence of Diabetes Mellitus condition.  Advised Patient risk of progression and worsening or improvement, then return of condition.  Will monitor condition for any change in future. Treat with most appropriate treatment pending status of condition.  Counseled and advised patient extensively on nature and ramifications of diabetes. Standard instructions given to patient for good diabetic foot care and maintenance. Advised importance of careful monitoring to avoid break down and complications secondary to diabetes. Advised patient importance of strict maintenance of blood sugar control. Advised patient of possible ominous results from neglect of condition, i.e.: amputation/ loss of digits, feet and legs, or even death.  Patient states understands counseling, will monitor closely, continue good hygiene and routine diabetic foot care. Patient will contact office is questions or problems.      An After Visit Summary was printed and given to the patient at discharge, including (if requested) any  available informative/educational handouts regarding diagnosis, treatment, or medications. All questions were answered to patient/family satisfaction. Should symptoms fail to improve or worsen they agree to call or return to clinic or to go to the Emergency Department. Discussed the importance of following up with any needed screening tests/labs/specialist appointments and any requested follow-up recommended by me today. Importance of maintaining follow-up discussed and patient accepts that missed appointments can delay diagnosis and potentially lead to worsening of conditions.    Return in about 9 weeks (around 6/9/2022) for Toenail Care., or sooner if acute issues arise.    This document has been electronically signed by Toby Gonzalez DPM on April 7, 2022 13:01 EDT

## 2022-05-19 ENCOUNTER — OFFICE VISIT (OUTPATIENT)
Dept: SLEEP MEDICINE | Facility: HOSPITAL | Age: 72
End: 2022-05-19

## 2022-05-19 VITALS
OXYGEN SATURATION: 93 % | HEART RATE: 62 BPM | HEIGHT: 72 IN | SYSTOLIC BLOOD PRESSURE: 163 MMHG | BODY MASS INDEX: 42.26 KG/M2 | DIASTOLIC BLOOD PRESSURE: 78 MMHG | WEIGHT: 312 LBS

## 2022-05-19 DIAGNOSIS — G47.33 OSA (OBSTRUCTIVE SLEEP APNEA): Primary | ICD-10-CM

## 2022-05-19 PROCEDURE — G0463 HOSPITAL OUTPT CLINIC VISIT: HCPCS | Performed by: INTERNAL MEDICINE

## 2022-05-19 NOTE — PROGRESS NOTES
"CONSULT NOTE    Patient Identification:  Jonas Mercado  71 y.o.  male  1950  [id number]            Requesting physician: Tristan Phipps    Reason for Consultation: KARLA on CPAP    CC:     History of Present Illness:  Very pleasant gentleman with known history of KARLA actually saw my partner  about 3 years ago.  He tells me his initial sleep study was done in September 2013 in [place].  I have a copy of his sleep study which I have reviewed.  Essentially it showed KARLA with AHI of 7.2 events per hour and with titration of CPAP at 14 cm he has been doing well.  He currently reports that his machine is making a buzzing sound that disturbs his wife.  He request a new CPAP machine.  His current CPAP is around 5 years old also.  He reports that when he does use his CPAP machine he feels rested with it.  No download available currently with his current CPAP machine.  Goes to bed 9 PM gets up 6 AM gets about 8+ hours of sleep and more rested with the CPAP.  No breakthrough snoring reported.  He tells me his weight has been up and down but still remains obese.      Review of Systems  Review of system none was documented on the sleep questionnaire  [place] 6 out of 24 within normal limits  Past Medical History:  Past Medical History:   Diagnosis Date   • Arthritis    • Back pain    • Bilateral ankle pain     NEUROPATHY   • Corns and callus    • Coronary artery disease     \"STIFF HEART\" ON BB, NO CARDIOLOGIST, PCP (-)CP OR SOA   • Diabetes mellitus type 2, controlled (HCC)    • Hammertoe    • Heel pain    • Hyperlipemia    • Hypertension    • Left foot pain    • Lymphedema     NO PROBLEMS LATELY   • Numbness in feet    • Pulmonary embolism (HCC) 2013    SADDLE PE, RESOLVED       Past Surgical History:  Past Surgical History:   Procedure Laterality Date   • BACK SURGERY  11/2021    S1-L1 FUSION (TOTAL OF 2 SURGERY)   • CARPAL TUNNEL RELEASE Right    • COLONOSCOPY  2014   • CYST REMOVAL      BACK   • FOOT SURGERY " "Right     ACHELLIS TENDON/HAMMER TOE   • KNEE ARTHROSCOPY Right     DIAGNOSTIC   • TEMPORAL ARTERY BIOPSY Right 2022    Procedure: Right temporal artery biopsy;  Surgeon: Wilbert Jacob MD;  Location: [facility] MAIN OR;  Service: Vascular;  Laterality: Right;        Home Meds:  (Not in a hospital admission)      Allergies:  Allergies   Allergen Reactions   • Aspirin Anaphylaxis       Social History:   Social History     Socioeconomic History   • Marital status:    Tobacco Use   • Smoking status: Former Smoker     Types: Cigarettes     Quit date: 1998     Years since quittin.3   • Smokeless tobacco: Never Used   Vaping Use   • Vaping Use: Never used   Substance and Sexual Activity   • Alcohol use: Not Currently     Comment: VERY RARE   • Drug use: Never   • Sexual activity: Defer       Family History:  Family History   Problem Relation Age of Onset   • Stroke Father    • Heart disease Brother    • Malig Hyperthermia Neg Hx        Physical Exam:  /78   Pulse 62   Ht 181.6 cm (71.5\")   Wt (!) 142 kg (312 lb)   SpO2 93%   BMI 42.91 kg/m²  Body mass index is 42.91 kg/m². 93% (!) 142 kg (312 lb)  Physical Exam  Patient is examined using the personal protective equipment as per guidelines from infection control for this particular patient as enacted.  Hand hygiene was performed before and after patient interaction.  Well-developed normal body habitus  Eyes normal conjunctivae and pupils reactive to light  ENT Mallampati between 3 and 4 normal nasal exam  Neck midline trachea no thyromegaly  Chest no labored breathing clear  CVS regular rate and rhythm no lower extremity edema  Abdomen soft nontender no hepatosplenomegaly  CNS intact normal sensory exam  Skin no rashes no nodules  Psych oriented to time place and person normal memory  Musculoskeletal no cyanosis no clubbing normal range of motion        LABS:  Lab Results   Component Value Date    CALCIUM 8.9 2022    PHOS 3.4 2020    "    No results found for: CKTOTAL, CKMB, CKMBINDEX, TROPONINI, TROPONINT                              Lab Results   Component Value Date    TSH 1.660 02/24/2021     CrCl cannot be calculated (Patient's most recent lab result is older than the maximum 30 days allowed.).         Imaging: I personally visualized the images of scans/x-rays performed within last 3 days.      Assessment:    1.  Obstructive sleep apnea, on CPAP.    2.  Morbid obesity (BMI = 40).    3.  Resistant hypertension.    4.  Hypertension  5 diabetes mellitus      Recommendations:  At this time we have a gentleman known history of KARLA currently on CPAP 14 cm.  Current CPAP machine not working well and old.  I would recommend replacing it with a new ResMed machine with mask refitting.  Order will be sent to [employer] for CPAP 14 cm.  Sleep hygiene measures  Reeducated patient on KARLA  Encouraged patient to lose weight  Correlation between KARLA and current comorbidities  Supplies will also be renewed  In the meantime until he gets his new machine he will continue using his old CPAP  Sleep hygiene measures  We will follow-up in 8 to 10 weeks after he gets his new CPAP machine for compliance download              Trudy Anderson MD  5/19/2022  11:29 EDT      Much of this encounter note is an electronic transcription/translation of spoken language to printed text using Dragon Software.

## 2022-07-07 ENCOUNTER — OFFICE VISIT (OUTPATIENT)
Dept: PODIATRY | Facility: CLINIC | Age: 72
End: 2022-07-07

## 2022-07-07 VITALS
WEIGHT: 310 LBS | DIASTOLIC BLOOD PRESSURE: 75 MMHG | BODY MASS INDEX: 41.99 KG/M2 | HEART RATE: 80 BPM | OXYGEN SATURATION: 94 % | SYSTOLIC BLOOD PRESSURE: 153 MMHG | TEMPERATURE: 96.8 F | HEIGHT: 72 IN

## 2022-07-07 DIAGNOSIS — E11.9 NON-INSULIN DEPENDENT TYPE 2 DIABETES MELLITUS: ICD-10-CM

## 2022-07-07 DIAGNOSIS — M72.2 PLANTAR FASCIITIS: ICD-10-CM

## 2022-07-07 DIAGNOSIS — E11.8 DIABETIC FOOT: ICD-10-CM

## 2022-07-07 DIAGNOSIS — G62.9 NEUROPATHY: Primary | ICD-10-CM

## 2022-07-07 DIAGNOSIS — B35.1 ONYCHOMYCOSIS: ICD-10-CM

## 2022-07-07 DIAGNOSIS — L60.0 ONYCHOCRYPTOSIS: ICD-10-CM

## 2022-07-07 DIAGNOSIS — M79.672 FOOT PAIN, BILATERAL: ICD-10-CM

## 2022-07-07 DIAGNOSIS — M79.671 FOOT PAIN, BILATERAL: ICD-10-CM

## 2022-07-07 PROCEDURE — 20550 NJX 1 TENDON SHEATH/LIGAMENT: CPT | Performed by: PODIATRIST

## 2022-07-07 PROCEDURE — 11721 DEBRIDE NAIL 6 OR MORE: CPT | Performed by: PODIATRIST

## 2022-07-07 PROCEDURE — G8404 LOW EXTEMITY NEUR EXAM DOCUM: HCPCS | Performed by: PODIATRIST

## 2022-07-07 PROCEDURE — 99213 OFFICE O/P EST LOW 20 MIN: CPT | Performed by: PODIATRIST

## 2022-07-07 RX ORDER — TRIAMCINOLONE ACETONIDE 40 MG/ML
20 INJECTION, SUSPENSION INTRA-ARTICULAR; INTRAMUSCULAR ONCE
Status: COMPLETED | OUTPATIENT
Start: 2022-07-07 | End: 2022-07-07

## 2022-07-07 RX ORDER — DULOXETIN HYDROCHLORIDE 60 MG/1
60 CAPSULE, DELAYED RELEASE ORAL DAILY
COMMUNITY

## 2022-07-07 RX ORDER — DEXAMETHASONE SODIUM PHOSPHATE 4 MG/ML
2 INJECTION, SOLUTION INTRA-ARTICULAR; INTRALESIONAL; INTRAMUSCULAR; INTRAVENOUS; SOFT TISSUE ONCE
Status: COMPLETED | OUTPATIENT
Start: 2022-07-07 | End: 2022-07-07

## 2022-07-07 RX ORDER — LIDOCAINE HYDROCHLORIDE 10 MG/ML
0.5 INJECTION, SOLUTION INFILTRATION; PERINEURAL ONCE
Status: COMPLETED | OUTPATIENT
Start: 2022-07-07 | End: 2022-07-07

## 2022-07-07 RX ORDER — SEMAGLUTIDE 1.34 MG/ML
INJECTION, SOLUTION SUBCUTANEOUS WEEKLY
COMMUNITY
Start: 2022-04-27 | End: 2023-01-05 | Stop reason: DRUGHIGH

## 2022-07-07 RX ADMIN — LIDOCAINE HYDROCHLORIDE 0.5 ML: 10 INJECTION, SOLUTION INFILTRATION; PERINEURAL at 13:28

## 2022-07-07 RX ADMIN — DEXAMETHASONE SODIUM PHOSPHATE 2 MG: 4 INJECTION, SOLUTION INTRA-ARTICULAR; INTRALESIONAL; INTRAMUSCULAR; INTRAVENOUS; SOFT TISSUE at 13:27

## 2022-07-07 RX ADMIN — TRIAMCINOLONE ACETONIDE 20 MG: 40 INJECTION, SUSPENSION INTRA-ARTICULAR; INTRAMUSCULAR at 13:29

## 2022-07-07 NOTE — PROGRESS NOTES
"    [facility]    Today's Date: 07/07/22    Patient Name: Jonas Mercado  MRN: [id number]  CSN: [id number]  PCP: Tristan Phipps, Last PCP Visit: 6/1/2022  Referring Provider: No ref. provider found    SUBJECTIVE     Chief Complaint   Patient presents with   • Left Foot - Follow-up, Nail Problem, Plantar Fasciitis     Wants to discuss brace for PF    • Right Foot - Follow-up, Nail Problem     HPI: Jonas Mercado, a 71 y.o.male, comes to clinic.    New, Established, New Problem:  established     Location:  Toenails    Duration:   Greater than five years    Onset:  Gradual    Nature:  sore with palpation.    Stable, worsening, improving:   Stable    Aggravating factors:  Pain with shoe gear and ambulation.    Previous Treatment:  debridement  __________________________________    NIDDM    Patient reported last blood glucose: 130  __________________________________    Patient relates recurrence of left heel pain request cortisone injection today.    Patient reports the following medical changes since their last visit: None.    Patient denies any fevers, chills, nausea, vomiting, shortness of breathe, nor any other constitutional signs nor symptoms.       Past Medical History:   Diagnosis Date   • Arthritis    • Back pain    • Bilateral ankle pain     NEUROPATHY   • Corns and callus    • Coronary artery disease     \"STIFF HEART\" ON BB, NO CARDIOLOGIST, PCP (-)CP OR SOA   • Diabetes mellitus type 2, controlled (HCC)    • Hammertoe    • Heel pain    • Hyperlipemia    • Hypertension    • Left foot pain    • Lymphedema     NO PROBLEMS LATELY   • Numbness in feet    • Pulmonary embolism (HCC) 2013    SADDLE PE, RESOLVED     Past Surgical History:   Procedure Laterality Date   • BACK SURGERY  11/2021    S1-L1 FUSION (TOTAL OF 2 SURGERY)   • CARPAL TUNNEL RELEASE Right    • COLONOSCOPY  2014   • CYST REMOVAL      BACK   • FOOT SURGERY Right     ACHELLIS TENDON/HAMMER TOE   • KNEE ARTHROSCOPY Right  "    DIAGNOSTIC   • TEMPORAL ARTERY BIOPSY Right 2022    Procedure: Right temporal artery biopsy;  Surgeon: Wilbert Jacob MD;  Location: [facility] MAIN OR;  Service: Vascular;  Laterality: Right;     Family History   Problem Relation Age of Onset   • Stroke Father    • Heart disease Brother    • Malig Hyperthermia Neg Hx      Social History     Socioeconomic History   • Marital status:    Tobacco Use   • Smoking status: Former Smoker     Types: Cigarettes     Quit date: 1998     Years since quittin.4   • Smokeless tobacco: Never Used   Vaping Use   • Vaping Use: Never used   Substance and Sexual Activity   • Alcohol use: Not Currently     Comment: VERY RARE   • Drug use: Never   • Sexual activity: Defer     Allergies   Allergen Reactions   • Aspirin Anaphylaxis     Current Outpatient Medications   Medication Sig Dispense Refill   • allopurinol (ZYLOPRIM) 300 MG tablet Take 1 tablet by mouth Daily. (Patient taking differently: Take 300 mg by mouth Every Morning.) 90 tablet 0   • amLODIPine (NORVASC) 5 MG tablet TAKE 1 TABLET DAILY (Patient taking differently: Take 5 mg by mouth Every Morning.) 90 tablet 3   • atorvastatin (LIPITOR) 20 MG tablet Take 1 tablet by mouth Daily. (Patient taking differently: Take 20 mg by mouth Every Morning.) 90 tablet 0   • Diclofenac Sodium (VOLTAREN) 1 % gel gel APPLY 2 GM OF GEL TO AFFECTED AREA 3 TIMES DAILY.  DO NOT APPLY MORE THAN 8 GM DAILY TO ANY ONE AFFECTED JOINT. 300 g 3   • DULoxetine (CYMBALTA) 60 MG capsule Take 60 mg by mouth Daily.     • furosemide (LASIX) 40 MG tablet Take 1 tablet by mouth daily as directed 90 tablet 3   • glipizide (GLUCOTROL) 5 MG tablet 2 tabs in AM & 1 tab in pm 270 tablet 0   • hydroCHLOROthiazide (HYDRODIURIL) 25 MG tablet Take 1 tablet by mouth Daily. (Patient taking differently: Take 25 mg by mouth Every Morning.) 90 tablet 0   • lidocaine (LIDODERM) 5 % Place 1 patch on the skin as directed by provider Daily. Remove & Discard  patch within 12 hours or as directed by MD Mukherjee patch 3   • lisinopril (PRINIVIL,ZESTRIL) 40 MG tablet Take 1 tablet by mouth Daily. (Patient taking differently: Take 40 mg by mouth Every Morning.) 90 tablet 0   • metoprolol succinate XL (TOPROL-XL) 200 MG 24 hr tablet Take 1 tablet by mouth Daily. (Patient taking differently: Take 200 mg by mouth Every Morning.) 90 tablet 0   • Ozempic, 0.25 or 0.5 MG/DOSE, 2 MG/1.5ML solution pen-injector      • pregabalin (LYRICA) 150 MG capsule Take 1 capsule by mouth 2 (Two) Times a Day. 180 capsule 0     Current Facility-Administered Medications   Medication Dose Route Frequency Provider Last Rate Last Admin   • dexamethasone sodium phosphate injection 2 mg  2 mg Intramuscular Once Toby Gonzalez DPM       • lidocaine (XYLOCAINE) 1 % injection 0.5 mL  0.5 mL Infiltration Once Toby Gonzalez DPM       • triamcinolone acetonide (KENALOG-40) injection 20 mg  20 mg Intramuscular Once Toby Gonzalez DPM         Review of Systems   Constitutional: Negative.    Skin:        Toenails 1 through 5 bilaterally   Neurological: Positive for numbness.   All other systems reviewed and are negative.      OBJECTIVE     Vitals:    07/07/22 1303   BP: 153/75   Pulse: 80   Temp: 96.8 °F (36 °C)   SpO2: 94%       Lab Results   Component Value Date    HGBA1C 7.7 (H) 11/03/2021       Lab Results   Component Value Date    GLUCOSE 266 (H) 01/19/2022    CALCIUM 8.9 01/19/2022     01/19/2022    K 4.0 01/19/2022    CO2 25.4 01/19/2022    CL 98 01/19/2022    BUN 22 01/19/2022    CREATININE 1.05 01/19/2022    EGFRIFNONA 70 01/19/2022    BCR 21.0 01/19/2022    ANIONGAP 12.6 01/19/2022       Patient seen in no apparent distress.      PHYSICAL EXAM:     Foot/Ankle Exam:       General:   Appearance: elderly    Appearance comment:  Morbidly obese  Orientation: AAOx3    Affect: appropriate    Shoe Gear:  Casual shoes    VASCULAR      Right Foot Vascularity   Dorsalis pedis:   1+  Posterior tibial:  1+  Skin Temperature: cool    Edema Grading:  Pitting and 2+  CFT:  3  Pedal Hair Growth:  Absent  Varicosities: mild varicosities       Left Foot Vascularity   Dorsalis pedis:  1+  Posterior tibial:  1+  Skin Temperature: cool    Edema Gradin+ and pitting  CFT:  3  Pedal Hair Growth:  Absent  Varicosities: mild varicosities        NEUROLOGIC     Right Foot Neurologic   Light touch sensation:  Diminished  Vibratory sensation:  Diminished  Hot/Cold sensation: diminished    Protective Sensation using [place]-Cheryl Monofilament:  Diminished     Left Foot Neurologic   Light touch sensation:  Diminished  Vibratory sensation:  Diminished  Hot/cold sensation: diminished    Protective Sensation using [place]-Cheryl Monofilament:  Diminished     MUSCULOSKELETAL      Left Foot Musculoskeletal   Tenderness: plantar fascia and plantar heel       MUSCLE STRENGTH     Right Foot Muscle Strength   Foot dorsiflexion:  4+  Foot plantar flexion:  4+  Foot inversion:  4+  Foot eversion:  4+     Left Foot Muscle Strength   Foot dorsiflexion:  4+  Foot plantar flexion:  4+  Foot inversion:  4+  Foot eversion:  4+     DERMATOLOGIC     Right Foot Dermatologic   Skin: skin intact    Nails: onychomycosis, abnormally thick, subungual debris, dystrophic nails and ingrown toenail    Nails comment:  Toenails 1 through 5     Left Foot Dermatologic   Skin: skin intact    Nails: onychomycosis, abnormally thick, subungual debris, dystrophic nails and ingrown toenail    Nails comment:  Toenails 1 through 5      ASSESSMENT/PLAN     Diagnoses and all orders for this visit:    1. Neuropathy (Primary)    2. Foot pain, bilateral    3. Diabetic foot (HCC)    4. Non-insulin dependent type 2 diabetes mellitus (HCC)    5. Onychocryptosis    6. Onychomycosis    7. Plantar fasciitis  -     lidocaine (XYLOCAINE) 1 % injection 0.5 mL  -     dexamethasone sodium phosphate injection 2 mg  -     triamcinolone acetonide (KENALOG-40)  injection 20 mg        Comprehensive lower extremity examination and evaluation was performed.    Discussed findings and treatment plan including risks, benefits, and treatment options with patient in detail. Patient agreed with treatment plan.    Toenails 1 through 5 bilaterally were debrided in thickness and length and then smoothed with a Dremel Tool.  Tolerated the procedure well without complications    Plantar Fasciitis Injection  Date/Time: 07/07/2022  Performed by: Toby Gonzalez DPM  Authorized by: Toby Gonzalez DPM     Consent: Verbal consent obtained. Written consent obtained.  Risks and benefits: risks, benefits and alternatives were discussed  Consent given by: patient  Patient identity confirmed: verbally with patient  Indications: pain relief    Injection site: Left heel.    Sedation:  Patient sedated: no    Patient position: sitting  Needle size: 27 G  Local anesthetic: 0.5 ml 1% Lidocaine plain, 0.5 ml Kenalog 40 mg/ml, and 0.5 ml Decadron 4 mg/mL.   Outcome: pain improved  Patient tolerance: Patient tolerated the procedure well with no immediate complications    Patient may begin to weight bear as tolerated in supportive shoes.  No impact activities for two weeks.  After that time, the patient may increase activities as tolerated. Patient states understanding and agreement with this plan.    Diabetic foot exam performed and documented this date, compliant with CQM required standards. Detail of findings as noted in physical exam.  Lower extremity Neurologic exam for diabetic patient performed and documented this date, compliant with PQRS required standards. Detail of findings as noted in physical exam.  Advised patient importance of good routine lower extremity hygiene. Advised patient importance of evaluating for intact skin and pain free nail borders.  Advised patient to use mirror to evaluate plantar/ soles of feet for better visualization. Advised patient monitor and phone office to  be seen if any cracking to skin, open lesions, painful nail borders or if nails become elongated prior to next visit. Advised patient importance of daily cleansing of lower extremities, followed by good skin cream to maintain normal hydration of skin. Also advised patient importance of close daily monitoring of blood sugar. Advised to regulate diet and medications to maintain control of blood sugar in optimal range. Contact primary care provider if difficulties maintaining blood sugar levels.  Advised Patient of presence of Diabetes Mellitus condition.  Advised Patient risk of progression and worsening or improvement, then return of condition.  Will monitor condition for any change in future. Treat with most appropriate treatment pending status of condition.  Counseled and advised patient extensively on nature and ramifications of diabetes. Standard instructions given to patient for good diabetic foot care and maintenance. Advised importance of careful monitoring to avoid break down and complications secondary to diabetes. Advised patient importance of strict maintenance of blood sugar control. Advised patient of possible ominous results from neglect of condition, i.e.: amputation/ loss of digits, feet and legs, or even death.  Patient states understands counseling, will monitor closely, continue good hygiene and routine diabetic foot care. Patient will contact office is questions or problems.      An After Visit Summary was printed and given to the patient at discharge, including (if requested) any available informative/educational handouts regarding diagnosis, treatment, or medications. All questions were answered to patient/family satisfaction. Should symptoms fail to improve or worsen they agree to call or return to clinic or to go to the Emergency Department. Discussed the importance of following up with any needed screening tests/labs/specialist appointments and any requested follow-up recommended by me today.  Importance of maintaining follow-up discussed and patient accepts that missed appointments can delay diagnosis and potentially lead to worsening of conditions.    Return in about 9 weeks (around 9/8/2022) for Toenail Care., or sooner if acute issues arise.    This document has been electronically signed by Toby Gonzalez DPM on July 7, 2022 13:26 EDT

## 2022-09-13 ENCOUNTER — TRANSCRIBE ORDERS (OUTPATIENT)
Dept: ADMINISTRATIVE | Facility: HOSPITAL | Age: 72
End: 2022-09-13

## 2022-09-13 DIAGNOSIS — N50.89 MASS OF SCROTUM: Primary | ICD-10-CM

## 2022-09-22 ENCOUNTER — OFFICE VISIT (OUTPATIENT)
Dept: SLEEP MEDICINE | Facility: HOSPITAL | Age: 72
End: 2022-09-22

## 2022-09-22 VITALS
HEIGHT: 71 IN | WEIGHT: 315 LBS | SYSTOLIC BLOOD PRESSURE: 162 MMHG | OXYGEN SATURATION: 92 % | HEART RATE: 65 BPM | DIASTOLIC BLOOD PRESSURE: 90 MMHG | BODY MASS INDEX: 44.1 KG/M2

## 2022-09-22 DIAGNOSIS — G47.33 OSA ON CPAP: ICD-10-CM

## 2022-09-22 DIAGNOSIS — Z99.89 OSA ON CPAP: ICD-10-CM

## 2022-09-22 PROCEDURE — G0463 HOSPITAL OUTPT CLINIC VISIT: HCPCS | Performed by: INTERNAL MEDICINE

## 2022-09-22 RX ORDER — TAMSULOSIN HYDROCHLORIDE 0.4 MG/1
1 CAPSULE ORAL DAILY
COMMUNITY
Start: 2022-09-06

## 2022-09-22 NOTE — PROGRESS NOTES
"      [facility]         Dr Trudy Anderson  [unfilled]  Patient Care Team:  Tristan Phipps as PCP - General (Family Medicine)    Chief Complaint: it showed KARLA with AHI of 7.2 events per hour and with titration of CPAP at 14 cm    Interval History: Patient here for compliance visit.  As recall currently set up on CPAP 14 cm.  Compliance today 100% average daily use 8 hours 40 minutes.  AHI leak look excellent.  Goes to bed 10 gets of 8 gets about 8 hours of sleep and feels rested.  No tobacco alcohol or caffeine abuse.  Currently has a nasal pillow that fits well.  Supplies been adequate.    REVIEW OF SYSTEMS:   CARDIOVASCULAR: No chest pain, chest pressure or chest discomfort. No palpitations or edema.   RESPIRATORY: No shortness of breath, cough or sputum.   GASTROINTESTINAL: No anorexia, nausea, vomiting or diarrhea. No abdominal pain or blood.   HEMATOLOGIC: No bleeding or bruising.  [place] 8 out of 24 within normal months    Ventilator/Non-Invasive Ventilation Settings (From admission, onward)            None            Vital Signs  Heart Rate:  [65] 65  BP: (162)/(90) 162/90  [unfilled]  Flowsheet Rows    Flowsheet Row First Filed Value   Admission Height 181.6 cm (71.5\") Documented at 09/22/2022 1300   Admission Weight 144 kg (316 lb 12.8 oz) Documented at 09/22/2022 1300          Physical Exam:  Patient is examined using the personal protective equipment as per guidelines from infection control for this particular patient as enacted.  Hand hygiene was performed before and after patient interaction.   General Appearance:    Alert, cooperative, in no acute distress.  Following simple commands  ENT Mallampati between 3 and 4 no nasal congestion  Neck midline trachea, no thyromegaly   Lungs:     Clear to auscultation, respirations regular, even and                  unlabored    Heart:    Regular rhythm and normal rate, normal S1 and S2, no            murmur, no gallop, no rub, no click   Chest Wall:    " No abnormalities observed   Abdomen:     Normal bowel sounds, no masses, no organomegaly, soft        nontender, nondistended, no guarding, no rebound                tenderness   Extremities:   Moves all extremities well, no edema, no cyanosis, no             redness  CNS no focal neurological deficits normal sensory exam  Skin no rashes no nodules  Musculoskeletal no cyanosis no clubbing normal range of motion     Results Review:                                          I reviewed the patient's new clinical results.  I personally viewed and interpreted the patient's chest x-ray.        Medication Review:       No current facility-administered medications for this visit.      ASSESSMENT:   1.  Obstructive sleep apnea, on CPAP.    2.  Morbid obesity (BMI = 40).    3.  Resistant hypertension.    4.  Hypertension  5 diabetes mellitus    PLAN:  Reviewed compliance download with the patient  Excellent compliance AHI and leak  Continue current CPAP 14 cm  Sleep hygiene measures  Treatment of underlying comorbidities  Overall doing much better and he can adjust his humidifier based according to his needs  We will follow-up in 1 year      Trudy Anderson MD  09/22/22  14:01 EDT

## 2022-09-26 ENCOUNTER — HOSPITAL ENCOUNTER (OUTPATIENT)
Dept: ULTRASOUND IMAGING | Facility: HOSPITAL | Age: 72
Discharge: HOME | End: 2022-09-26
Admitting: FAMILY MEDICINE

## 2022-09-26 DIAGNOSIS — N50.89 MASS OF SCROTUM: ICD-10-CM

## 2022-09-26 PROCEDURE — 76870 US EXAM SCROTUM: CPT

## 2022-10-06 ENCOUNTER — OFFICE VISIT (OUTPATIENT)
Dept: PODIATRY | Facility: CLINIC | Age: 72
End: 2022-10-06

## 2022-10-06 VITALS
SYSTOLIC BLOOD PRESSURE: 143 MMHG | HEART RATE: 80 BPM | WEIGHT: 315 LBS | OXYGEN SATURATION: 92 % | BODY MASS INDEX: 42.66 KG/M2 | HEIGHT: 72 IN | TEMPERATURE: 99.1 F | DIASTOLIC BLOOD PRESSURE: 68 MMHG

## 2022-10-06 DIAGNOSIS — E11.8 DIABETIC FOOT: ICD-10-CM

## 2022-10-06 DIAGNOSIS — E11.9 NON-INSULIN DEPENDENT TYPE 2 DIABETES MELLITUS: ICD-10-CM

## 2022-10-06 DIAGNOSIS — L60.0 ONYCHOCRYPTOSIS: ICD-10-CM

## 2022-10-06 DIAGNOSIS — M79.671 FOOT PAIN, BILATERAL: ICD-10-CM

## 2022-10-06 DIAGNOSIS — M79.672 FOOT PAIN, BILATERAL: ICD-10-CM

## 2022-10-06 DIAGNOSIS — B35.1 ONYCHOMYCOSIS: ICD-10-CM

## 2022-10-06 DIAGNOSIS — G62.9 NEUROPATHY: Primary | ICD-10-CM

## 2022-10-06 DIAGNOSIS — M72.2 PLANTAR FASCIITIS: ICD-10-CM

## 2022-10-06 PROCEDURE — 99213 OFFICE O/P EST LOW 20 MIN: CPT | Performed by: PODIATRIST

## 2022-10-06 PROCEDURE — 20550 NJX 1 TENDON SHEATH/LIGAMENT: CPT | Performed by: PODIATRIST

## 2022-10-06 PROCEDURE — G8404 LOW EXTEMITY NEUR EXAM DOCUM: HCPCS | Performed by: PODIATRIST

## 2022-10-06 PROCEDURE — 11721 DEBRIDE NAIL 6 OR MORE: CPT | Performed by: PODIATRIST

## 2022-10-06 RX ORDER — DEXAMETHASONE SODIUM PHOSPHATE 4 MG/ML
2 INJECTION, SOLUTION INTRA-ARTICULAR; INTRALESIONAL; INTRAMUSCULAR; INTRAVENOUS; SOFT TISSUE ONCE
Status: COMPLETED | OUTPATIENT
Start: 2022-10-06 | End: 2022-10-06

## 2022-10-06 RX ORDER — TRIAMCINOLONE ACETONIDE 40 MG/ML
20 INJECTION, SUSPENSION INTRA-ARTICULAR; INTRAMUSCULAR ONCE
Status: COMPLETED | OUTPATIENT
Start: 2022-10-06 | End: 2022-10-06

## 2022-10-06 RX ORDER — LIDOCAINE HYDROCHLORIDE 10 MG/ML
0.5 INJECTION, SOLUTION INFILTRATION; PERINEURAL ONCE
Status: COMPLETED | OUTPATIENT
Start: 2022-10-06 | End: 2022-10-06

## 2022-10-06 RX ADMIN — DEXAMETHASONE SODIUM PHOSPHATE 2 MG: 4 INJECTION, SOLUTION INTRA-ARTICULAR; INTRALESIONAL; INTRAMUSCULAR; INTRAVENOUS; SOFT TISSUE at 13:00

## 2022-10-06 RX ADMIN — TRIAMCINOLONE ACETONIDE 20 MG: 40 INJECTION, SUSPENSION INTRA-ARTICULAR; INTRAMUSCULAR at 13:01

## 2022-10-06 RX ADMIN — LIDOCAINE HYDROCHLORIDE 0.5 ML: 10 INJECTION, SOLUTION INFILTRATION; PERINEURAL at 13:01

## 2022-10-06 NOTE — PROGRESS NOTES
"    [facility]    Today's Date: 10/06/22    Patient Name: Jonas Mercado  MRN: [id number]  CSN: [id number]  PCP: Tristan Phipps, Last PCP Visit: 9/6/2022  Referring Provider: No ref. provider found    SUBJECTIVE     Chief Complaint   Patient presents with   • Left Foot - Follow-up, Nail Problem, Plantar Fasciitis     Wants injection   • Right Foot - Follow-up, Nail Problem     HPI: Jonas Mercado, a 71 y.o.male, comes to clinic.    New, Established, New Problem:  established     Location:  Toenails    Duration:   Greater than five years    Onset:  Gradual    Nature:  sore with palpation.    Stable, worsening, improving:   Stable    Aggravating factors:  Pain with shoe gear and ambulation.    Previous Treatment:  debridement  __________________________________    NIDDM    Patient reported last blood glucose: 135  __________________________________    Patient relates recurrence of left heel pain request cortisone injection today.    Patient relates no medical changes since their last visit.    Patient denies any fevers, chills, nausea, vomiting, shortness of breathe, nor any other constitutional signs nor symptoms.       Past Medical History:   Diagnosis Date   • Arthritis    • Back pain    • Bilateral ankle pain     NEUROPATHY   • Corns and callus    • Coronary artery disease     \"STIFF HEART\" ON BB, NO CARDIOLOGIST, PCP (-)CP OR SOA   • Diabetes mellitus type 2, controlled (HCC)    • Hammertoe    • Heel pain    • Hyperlipemia    • Hypertension    • Left foot pain    • Lymphedema     NO PROBLEMS LATELY   • Numbness in feet    • Pulmonary embolism (HCC) 2013    SADDLE PE, RESOLVED     Past Surgical History:   Procedure Laterality Date   • BACK SURGERY  11/2021    S1-L1 FUSION (TOTAL OF 2 SURGERY)   • CARPAL TUNNEL RELEASE Right    • COLONOSCOPY  2014   • CYST REMOVAL      BACK   • FOOT SURGERY Right     ACHELLIS TENDON/HAMMER TOE   • KNEE ARTHROSCOPY Right     DIAGNOSTIC   • TEMPORAL " ARTERY BIOPSY Right 2022    Procedure: Right temporal artery biopsy;  Surgeon: Wilbert Jacob MD;  Location: [facility] MAIN OR;  Service: Vascular;  Laterality: Right;     Family History   Problem Relation Age of Onset   • Stroke Father    • Heart disease Brother    • Malig Hyperthermia Neg Hx      Social History     Socioeconomic History   • Marital status:    Tobacco Use   • Smoking status: Former Smoker     Types: Cigarettes     Quit date: 1998     Years since quittin.6   • Smokeless tobacco: Never Used   Vaping Use   • Vaping Use: Never used   Substance and Sexual Activity   • Alcohol use: Not Currently     Comment: VERY RARE   • Drug use: Never   • Sexual activity: Defer     Allergies   Allergen Reactions   • Aspirin Anaphylaxis     Current Outpatient Medications   Medication Sig Dispense Refill   • allopurinol (ZYLOPRIM) 300 MG tablet Take 1 tablet by mouth Daily. (Patient taking differently: Take 300 mg by mouth Every Morning.) 90 tablet 0   • amLODIPine (NORVASC) 5 MG tablet TAKE 1 TABLET DAILY (Patient taking differently: Take 5 mg by mouth Every Morning.) 90 tablet 3   • atorvastatin (LIPITOR) 20 MG tablet Take 1 tablet by mouth Daily. (Patient taking differently: Take 20 mg by mouth Every Morning.) 90 tablet 0   • DULoxetine (CYMBALTA) 60 MG capsule Take 60 mg by mouth Daily.     • glipizide (GLUCOTROL) 5 MG tablet 2 tabs in AM & 1 tab in pm (Patient taking differently: 2 tabs in AM & 2 tab in pm) 270 tablet 0   • lidocaine (LIDODERM) 5 % Place 1 patch on the skin as directed by provider Daily. Remove & Discard patch within 12 hours or as directed by MD 30 patch 3   • lisinopril (PRINIVIL,ZESTRIL) 40 MG tablet Take 1 tablet by mouth Daily. (Patient taking differently: Take 40 mg by mouth Every Morning.) 90 tablet 0   • metFORMIN (GLUCOPHAGE) 1000 MG tablet Take 1,000 mg by mouth 2 (Two) Times a Day With Meals.     • metoprolol succinate XL (TOPROL-XL) 200 MG 24 hr tablet Take 1 tablet by  mouth Daily. (Patient taking differently: Take 200 mg by mouth Every Morning.) 90 tablet 0   • Ozempic, 0.25 or 0.5 MG/DOSE, 2 MG/1.5ML solution pen-injector Inject  under the skin into the appropriate area as directed 1 (One) Time Per Week.     • pregabalin (LYRICA) 150 MG capsule Take 1 capsule by mouth 2 (Two) Times a Day. 180 capsule 0   • tamsulosin (FLOMAX) 0.4 MG capsule 24 hr capsule Take 1 capsule by mouth Daily.     • furosemide (LASIX) 40 MG tablet Take 1 tablet by mouth daily as directed 90 tablet 3     Current Facility-Administered Medications   Medication Dose Route Frequency Provider Last Rate Last Admin   • dexamethasone sodium phosphate injection 2 mg  2 mg Intramuscular Once Toby Gonzalez DPM       • lidocaine (XYLOCAINE) 1 % injection 0.5 mL  0.5 mL Infiltration Once Toby Gonzalez DPM       • triamcinolone acetonide (KENALOG-40) injection 20 mg  20 mg Intramuscular Once Toby Gonzalez DPM         Review of Systems   Constitutional: Negative.    Musculoskeletal:        Left heel pain   Skin:        Toenails 1 through 5 bilaterally   Neurological: Positive for numbness.   All other systems reviewed and are negative.      OBJECTIVE     Vitals:    10/06/22 1218   BP: 143/68   Pulse: 80   Temp: 99.1 °F (37.3 °C)   SpO2: 92%       Lab Results   Component Value Date    HGBA1C 7.7 (H) 11/03/2021       Lab Results   Component Value Date    GLUCOSE 266 (H) 01/19/2022    CALCIUM 8.9 01/19/2022     01/19/2022    K 4.0 01/19/2022    CO2 25.4 01/19/2022    CL 98 01/19/2022    BUN 22 01/19/2022    CREATININE 1.05 01/19/2022    EGFRIFNONA 70 01/19/2022    BCR 21.0 01/19/2022    ANIONGAP 12.6 01/19/2022       Patient seen in no apparent distress.      PHYSICAL EXAM:     Foot/Ankle Exam:       General:   Diabetic Foot Exam Performed    Appearance: elderly    Appearance comment:  Morbidly obese  Orientation: AAOx3    Affect: appropriate    Shoe Gear:  Casual shoes    VASCULAR       Right Foot Vascularity   Dorsalis pedis:  1+  Posterior tibial:  1+  Skin Temperature: cool    Edema Grading:  Pitting and 2+  CFT:  3  Pedal Hair Growth:  Absent  Varicosities: mild varicosities       Left Foot Vascularity   Dorsalis pedis:  1+  Posterior tibial:  1+  Skin Temperature: cool    Edema Gradin+ and pitting  CFT:  3  Pedal Hair Growth:  Absent  Varicosities: mild varicosities        NEUROLOGIC     Right Foot Neurologic   Light touch sensation:  Diminished  Vibratory sensation:  Diminished  Hot/Cold sensation: diminished    Protective Sensation using Marion-Cheryl Monofilament:  Diminished     Left Foot Neurologic   Light touch sensation:  Diminished  Vibratory sensation:  Diminished  Hot/cold sensation: diminished    Protective Sensation using Marion-Cheryl Monofilament:  Diminished     MUSCULOSKELETAL      Left Foot Musculoskeletal   Tenderness: plantar fascia and plantar heel       MUSCLE STRENGTH     Right Foot Muscle Strength   Foot dorsiflexion:  4  Foot plantar flexion:  4  Foot inversion:  4  Foot eversion:  4     Left Foot Muscle Strength   Foot dorsiflexion:  4  Foot plantar flexion:  4  Foot inversion:  4  Foot eversion:  4     DERMATOLOGIC     Right Foot Dermatologic   Skin: skin intact    Nails: onychomycosis, abnormally thick, subungual debris, dystrophic nails and ingrown toenail    Nails comment:  Toenails 1 through 5     Left Foot Dermatologic   Skin: skin intact    Nails: onychomycosis, abnormally thick, subungual debris, dystrophic nails and ingrown toenail    Nails comment:  Toenails 1 through 5      ASSESSMENT/PLAN     Diagnoses and all orders for this visit:    1. Neuropathy (Primary)    2. Foot pain, bilateral    3. Diabetic foot (HCC)    4. Onychomycosis    5. Onychocryptosis    6. Non-insulin dependent type 2 diabetes mellitus (HCC)    7. Plantar fasciitis  -     dexamethasone sodium phosphate injection 2 mg  -     lidocaine (XYLOCAINE) 1 % injection 0.5 mL  -      triamcinolone acetonide (KENALOG-40) injection 20 mg        Comprehensive lower extremity examination and evaluation was performed.    Discussed findings and treatment plan including risks, benefits, and treatment options with patient in detail. Patient agreed with treatment plan.    Toenails 1 through 5 bilaterally were debrided in thickness and length and then smoothed with a Dremel Tool.  Tolerated the procedure well without complications    Plantar Fasciitis Injection  Date/Time: 10/6/2022  Performed by: Toby Gonzalez DPM  Authorized by: Toby Gonzalez DPM     Consent: Verbal consent obtained. Written consent obtained.  Risks and benefits: risks, benefits and alternatives were discussed  Consent given by: patient  Patient identity confirmed: verbally with patient  Indications: pain relief    Injection site: Left heel.    Sedation:  Patient sedated: no    Patient position: sitting  Needle size: 27 G  Local anesthetic: 0.5 ml 1% Lidocaine plain, 0.5 ml Kenalog 40 mg/ml, and 0.5 ml Decadron 4 mg/mL.   Outcome: pain improved  Patient tolerance: Patient tolerated the procedure well with no immediate complications    Patient may begin to weight bear as tolerated in supportive shoes.  No impact activities for two weeks.  After that time, the patient may increase activities as tolerated. Patient states understanding and agreement with this plan.    Diabetic foot exam performed and documented this date, compliant with CQM required standards. Detail of findings as noted in physical exam.  Lower extremity Neurologic exam for diabetic patient performed and documented this date, compliant with PQRS required standards. Detail of findings as noted in physical exam.  Advised patient importance of good routine lower extremity hygiene. Advised patient importance of evaluating for intact skin and pain free nail borders.  Advised patient to use mirror to evaluate plantar/ soles of feet for better visualization. Advised  patient monitor and phone office to be seen if any cracking to skin, open lesions, painful nail borders or if nails become elongated prior to next visit. Advised patient importance of daily cleansing of lower extremities, followed by good skin cream to maintain normal hydration of skin. Also advised patient importance of close daily monitoring of blood sugar. Advised to regulate diet and medications to maintain control of blood sugar in optimal range. Contact primary care provider if difficulties maintaining blood sugar levels.  Advised Patient of presence of Diabetes Mellitus condition.  Advised Patient risk of progression and worsening or improvement, then return of condition.  Will monitor condition for any change in future. Treat with most appropriate treatment pending status of condition.  Counseled and advised patient extensively on nature and ramifications of diabetes. Standard instructions given to patient for good diabetic foot care and maintenance. Advised importance of careful monitoring to avoid break down and complications secondary to diabetes. Advised patient importance of strict maintenance of blood sugar control. Advised patient of possible ominous results from neglect of condition, i.e.: amputation/ loss of digits, feet and legs, or even death.  Patient states understands counseling, will monitor closely, continue good hygiene and routine diabetic foot care. Patient will contact office is questions or problems.      An After Visit Summary was printed and given to the patient at discharge, including (if requested) any available informative/educational handouts regarding diagnosis, treatment, or medications. All questions were answered to patient/family satisfaction. Should symptoms fail to improve or worsen they agree to call or return to clinic or to go to the Emergency Department. Discussed the importance of following up with any needed screening tests/labs/specialist appointments and any  requested follow-up recommended by me today. Importance of maintaining follow-up discussed and patient accepts that missed appointments can delay diagnosis and potentially lead to worsening of conditions.    Return in about 9 weeks (around 12/8/2022) for Toenail Care., or sooner if acute issues arise.    This document has been electronically signed by Toby Gonzalez DPM on October 6, 2022 12:35 EDT

## 2023-01-05 ENCOUNTER — OFFICE VISIT (OUTPATIENT)
Dept: PODIATRY | Facility: CLINIC | Age: 73
End: 2023-01-05
Payer: MEDICARE

## 2023-01-05 VITALS
DIASTOLIC BLOOD PRESSURE: 87 MMHG | BODY MASS INDEX: 42.66 KG/M2 | TEMPERATURE: 98.8 F | OXYGEN SATURATION: 96 % | HEIGHT: 72 IN | SYSTOLIC BLOOD PRESSURE: 152 MMHG | HEART RATE: 80 BPM | WEIGHT: 315 LBS

## 2023-01-05 DIAGNOSIS — B35.1 ONYCHOMYCOSIS: Primary | ICD-10-CM

## 2023-01-05 DIAGNOSIS — L60.0 ONYCHOCRYPTOSIS: ICD-10-CM

## 2023-01-05 DIAGNOSIS — E11.9 NON-INSULIN DEPENDENT TYPE 2 DIABETES MELLITUS: ICD-10-CM

## 2023-01-05 DIAGNOSIS — M79.671 FOOT PAIN, BILATERAL: ICD-10-CM

## 2023-01-05 DIAGNOSIS — M79.672 FOOT PAIN, BILATERAL: ICD-10-CM

## 2023-01-05 DIAGNOSIS — E11.8 DIABETIC FOOT: ICD-10-CM

## 2023-01-05 PROCEDURE — G8404 LOW EXTEMITY NEUR EXAM DOCUM: HCPCS | Performed by: PODIATRIST

## 2023-01-05 PROCEDURE — 11721 DEBRIDE NAIL 6 OR MORE: CPT | Performed by: PODIATRIST

## 2023-01-05 RX ORDER — SEMAGLUTIDE 1.34 MG/ML
INJECTION, SOLUTION SUBCUTANEOUS
COMMUNITY
Start: 2022-11-03 | End: 2023-03-22 | Stop reason: SDUPTHER

## 2023-01-05 NOTE — PROGRESS NOTES
[facility] - PODIATRY    Today's Date: 01/05/23    Patient Name: Jonas Mercado  MRN: [id number]  CSN: [id number]  PCP: Tristan Phipps, Last PCP Visit: 12/5/2022  Referring Provider: No ref. provider found    SUBJECTIVE     Chief Complaint   Patient presents with   • Left Foot - Nail Problem, Follow-up   • Right Foot - Nail Problem, Follow-up     HPI: Jonas Mercado, a 72 y.o.male, comes to clinic.    New, Established, New Problem:  established     Location:  Toenails    Duration:   Greater than five years    Onset:  Gradual    Nature:  sore with palpation.    Stable, worsening, improving:   Stable    Aggravating factors:  Pain with shoe gear and ambulation.    Previous Treatment:  debridement  __________________________________    NIDDM    Patient reported last blood glucose: 144  __________________________________    Patient reports the following medical changes since their last visit: Being evaluated for right total knee arthroplasty    Patient denies any fevers, chills, nausea, vomiting, shortness of breathe, nor any other constitutional signs nor symptoms.       Past Medical History:   Diagnosis Date   • Arthritis    • Back pain    • Bilateral ankle pain     NEUROPATHY   • Corns and callus    • Coronary artery disease     \"STIFF HEART\" ON BB, NO CARDIOLOGIST, PCP (-)CP OR SOA   • Diabetes mellitus type 2, controlled (HCC)    • Hammertoe    • Heel pain    • Hyperlipemia    • Hypertension    • Left foot pain    • Lymphedema     NO PROBLEMS LATELY   • Numbness in feet    • Pulmonary embolism (HCC) 2013    SADDLE PE, RESOLVED     Past Surgical History:   Procedure Laterality Date   • BACK SURGERY  11/2021    S1-L1 FUSION (TOTAL OF 2 SURGERY)   • CARPAL TUNNEL RELEASE Right    • COLONOSCOPY  2014   • CYST REMOVAL      BACK   • FOOT SURGERY Right     ACHELLIS TENDON/HAMMER TOE   • KNEE ARTHROSCOPY Right     DIAGNOSTIC   • TEMPORAL ARTERY BIOPSY Right 1/19/2022    Procedure: Right temporal artery  biopsy;  Surgeon: Wilbert Jacob MD;  [facility];  Service: Vascular;  Laterality: Right;     Family History   Problem Relation Age of Onset   • Stroke Father    • Heart disease Brother    • Malig Hyperthermia Neg Hx      Social History     Socioeconomic History   • Marital status:    Tobacco Use   • Smoking status: Former     Types: Cigarettes     Quit date: 1998     Years since quittin.9   • Smokeless tobacco: Never   Vaping Use   • Vaping Use: Never used   Substance and Sexual Activity   • Alcohol use: Not Currently     Comment: VERY RARE   • Drug use: Never   • Sexual activity: Defer     Allergies   Allergen Reactions   • Aspirin Anaphylaxis     Current Outpatient Medications   Medication Sig Dispense Refill   • allopurinol (ZYLOPRIM) 300 MG tablet Take 1 tablet by mouth Daily. (Patient taking differently: Take 300 mg by mouth Every Morning.) 90 tablet 0   • amLODIPine (NORVASC) 5 MG tablet TAKE 1 TABLET DAILY (Patient taking differently: Take 5 mg by mouth Every Morning.) 90 tablet 3   • atorvastatin (LIPITOR) 20 MG tablet Take 1 tablet by mouth Daily. (Patient taking differently: Take 20 mg by mouth Every Morning.) 90 tablet 0   • Diclofenac Sodium (VOLTAREN) 1 % gel gel Apply 1 application topically to the appropriate area as directed.     • DULoxetine (CYMBALTA) 60 MG capsule Take 60 mg by mouth Daily.     • glipizide (GLUCOTROL) 5 MG tablet 2 tabs in AM & 1 tab in pm (Patient taking differently: 2 tabs in AM & 2 tab in pm) 270 tablet 0   • lidocaine (LIDODERM) 5 % Place 1 patch on the skin as directed by provider Daily. Remove & Discard patch within 12 hours or as directed by MD 30 patch 3   • lisinopril (PRINIVIL,ZESTRIL) 40 MG tablet Take 1 tablet by mouth Daily. (Patient taking differently: Take 40 mg by mouth Every Morning.) 90 tablet 0   • metFORMIN (GLUCOPHAGE) 1000 MG tablet Take 1,000 mg by mouth 2 (Two) Times a Day With Meals.     • metoprolol succinate XL (TOPROL-XL)  200 MG 24 hr tablet Take 1 tablet by mouth Daily. (Patient taking differently: Take 200 mg by mouth Every Morning.) 90 tablet 0   • Ozempic, 1 MG/DOSE, 4 MG/3ML solution pen-injector      • pregabalin (LYRICA) 150 MG capsule Take 1 capsule by mouth 2 (Two) Times a Day. 180 capsule 0   • tamsulosin (FLOMAX) 0.4 MG capsule 24 hr capsule Take 1 capsule by mouth Daily.       No current facility-administered medications for this visit.     Review of Systems   Constitutional: Negative.    Skin:        Toenails 1 through 5 bilaterally   Neurological: Positive for numbness.   All other systems reviewed and are negative.      OBJECTIVE     Vitals:    23 1233   BP: 152/87   Pulse: 80   Temp: 98.8 °F (37.1 °C)   SpO2: 96%       Lab Results   Component Value Date    HGBA1C 7.7 (H) 2021       Lab Results   Component Value Date    GLUCOSE 266 (H) 2022    CALCIUM 8.9 2022     2022    K 4.0 2022    CO2 25.4 2022    CL 98 2022    BUN 22 2022    CREATININE 1.05 2022    EGFRIFNONA 70 2022    BCR 21.0 2022    ANIONGAP 12.6 2022       Patient seen in no apparent distress.      PHYSICAL EXAM:     Foot/Ankle Exam:       General:   Diabetic Foot Exam Performed    Appearance: obesity and elderly    Orientation: AAOx3    Affect: appropriate    Shoe Gear:  Casual shoes    VASCULAR      Right Foot Vascularity   Dorsalis pedis:  1+  Posterior tibial:  1+  Skin Temperature: cool    Edema Grading:  Pitting and 2+  CFT:  3  Pedal Hair Growth:  Absent  Varicosities: mild varicosities       Left Foot Vascularity   Dorsalis pedis:  1+  Posterior tibial:  1+  Skin Temperature: cool    Edema Gradin+ and pitting  CFT:  3  Pedal Hair Growth:  Absent  Varicosities: mild varicosities        NEUROLOGIC     Right Foot Neurologic   Light touch sensation:  Diminished  Vibratory sensation:  Diminished  Hot/Cold sensation: diminished    Protective Sensation using  Adams-Cheryl Monofilament:  Diminished     Left Foot Neurologic   Light touch sensation:  Diminished  Vibratory sensation:  Diminished  Hot/cold sensation: diminished    Protective Sensation using Adams-Cheryl Monofilament:  Diminished     MUSCLE STRENGTH     Right Foot Muscle Strength   Foot dorsiflexion:  4  Foot plantar flexion:  4  Foot inversion:  4  Foot eversion:  4     Left Foot Muscle Strength   Foot dorsiflexion:  4  Foot plantar flexion:  4  Foot inversion:  4  Foot eversion:  4     DERMATOLOGIC     Right Foot Dermatologic   Skin: skin intact    Nails: onychomycosis, abnormally thick, subungual debris, dystrophic nails and ingrown toenail    Nails comment:  Toenails 1 through 5     Left Foot Dermatologic   Skin: skin intact    Nails: onychomycosis, abnormally thick, subungual debris, dystrophic nails and ingrown toenail    Nails comment:  Toenails 1 through 5      ASSESSMENT/PLAN     Diagnoses and all orders for this visit:    1. Onychomycosis (Primary)    2. Foot pain, bilateral    3. Onychocryptosis    4. Diabetic foot (HCC)    5. Non-insulin dependent type 2 diabetes mellitus (HCC)        Comprehensive lower extremity examination and evaluation was performed.    Discussed findings and treatment plan including risks, benefits, and treatment options with patient in detail. Patient agreed with treatment plan.    Toenails 1 through 5 bilaterally were debrided in thickness and length and then smoothed with a Dremel Tool.  Tolerated the procedure well without complications.    Diabetic foot exam performed and documented this date, compliant with CQM required standards. Detail of findings as noted in physical exam.  Lower extremity Neurologic exam for diabetic patient performed and documented this date, compliant with PQRS required standards. Detail of findings as noted in physical exam.  Advised patient importance of good routine lower extremity hygiene. Advised patient importance of evaluating for  intact skin and pain free nail borders.  Advised patient to use mirror to evaluate plantar/ soles of feet for better visualization. Advised patient monitor and phone office to be seen if any cracking to skin, open lesions, painful nail borders or if nails become elongated prior to next visit. Advised patient importance of daily cleansing of lower extremities, followed by good skin cream to maintain normal hydration of skin. Also advised patient importance of close daily monitoring of blood sugar. Advised to regulate diet and medications to maintain control of blood sugar in optimal range. Contact primary care provider if difficulties maintaining blood sugar levels.  Advised Patient of presence of Diabetes Mellitus condition.  Advised Patient risk of progression and worsening or improvement, then return of condition.  Will monitor condition for any change in future. Treat with most appropriate treatment pending status of condition.  Counseled and advised patient extensively on nature and ramifications of diabetes. Standard instructions given to patient for good diabetic foot care and maintenance. Advised importance of careful monitoring to avoid break down and complications secondary to diabetes. Advised patient importance of strict maintenance of blood sugar control. Advised patient of possible ominous results from neglect of condition, i.e.: amputation/ loss of digits, feet and legs, or even death.  Patient states understands counseling, will monitor closely, continue good hygiene and routine diabetic foot care. Patient will contact office is questions or problems.      An After Visit Summary was printed and given to the patient at discharge, including (if requested) any available informative/educational handouts regarding diagnosis, treatment, or medications. All questions were answered to patient/family satisfaction. Should symptoms fail to improve or worsen they agree to call or return to clinic or to go to the  Emergency Department. Discussed the importance of following up with any needed screening tests/labs/specialist appointments and any requested follow-up recommended by me today. Importance of maintaining follow-up discussed and patient accepts that missed appointments can delay diagnosis and potentially lead to worsening of conditions.    Return in about 9 weeks (around 3/9/2023) for Toenail Care., or sooner if acute issues arise.    This document has been electronically signed by Toby Gonzalez DPM on January 5, 2023 12:59 EST

## 2023-03-22 ENCOUNTER — OFFICE VISIT (OUTPATIENT)
Dept: UROLOGY | Facility: CLINIC | Age: 73
End: 2023-03-22
Payer: MEDICARE

## 2023-03-22 VITALS — RESPIRATION RATE: 14 BRPM | HEIGHT: 71 IN | BODY MASS INDEX: 45.61 KG/M2

## 2023-03-22 DIAGNOSIS — N43.3 HYDROCELE, UNSPECIFIED HYDROCELE TYPE: Primary | ICD-10-CM

## 2023-03-22 PROBLEM — M51.36 LUMBAR ADJACENT SEGMENT DISEASE WITH SPONDYLOLISTHESIS: Status: ACTIVE | Noted: 2021-11-01

## 2023-03-22 PROBLEM — M75.121 NONTRAUMATIC COMPLETE TEAR OF RIGHT ROTATOR CUFF: Status: ACTIVE | Noted: 2022-10-31

## 2023-03-22 PROBLEM — M51.369 LUMBAR ADJACENT SEGMENT DISEASE WITH SPONDYLOLISTHESIS: Status: ACTIVE | Noted: 2021-11-01

## 2023-03-22 PROBLEM — N40.0 BPH (BENIGN PROSTATIC HYPERPLASIA): Status: ACTIVE | Noted: 2022-11-03

## 2023-03-22 PROBLEM — M17.0 BILATERAL PRIMARY OSTEOARTHRITIS OF KNEE: Status: ACTIVE | Noted: 2022-10-31

## 2023-03-22 PROBLEM — R60.0 LOCALIZED EDEMA: Status: ACTIVE | Noted: 2023-02-07

## 2023-03-22 PROBLEM — M43.16 LUMBAR ADJACENT SEGMENT DISEASE WITH SPONDYLOLISTHESIS: Status: ACTIVE | Noted: 2021-11-01

## 2023-03-22 PROBLEM — R51.9 HEADACHE: Status: ACTIVE | Noted: 2022-01-27

## 2023-03-22 PROBLEM — M48.00 SPINAL STENOSIS: Status: ACTIVE | Noted: 2021-10-13

## 2023-03-22 PROCEDURE — 99214 OFFICE O/P EST MOD 30 MIN: CPT | Performed by: NURSE PRACTITIONER

## 2023-03-22 PROCEDURE — 1159F MED LIST DOCD IN RCRD: CPT | Performed by: NURSE PRACTITIONER

## 2023-03-22 PROCEDURE — 1160F RVW MEDS BY RX/DR IN RCRD: CPT | Performed by: NURSE PRACTITIONER

## 2023-03-22 RX ORDER — EMPAGLIFLOZIN 10 MG/1
TABLET, FILM COATED ORAL
COMMUNITY
Start: 2023-02-09

## 2023-03-22 RX ORDER — SEMAGLUTIDE 2.68 MG/ML
2 INJECTION, SOLUTION SUBCUTANEOUS
COMMUNITY
Start: 2023-02-07 | End: 2024-02-07

## 2023-03-22 RX ORDER — ALBUTEROL SULFATE 90 UG/1
1 AEROSOL, METERED RESPIRATORY (INHALATION) EVERY 6 HOURS PRN
COMMUNITY
Start: 2023-03-03

## 2023-03-22 NOTE — PROGRESS NOTES
"Chief Complaint: hydrocele (Pt here for hydrocele.  )    Subjective         History of Present Illness  Jonas Mercado is a 72 y.o. male presents to [facility] to be seen for hydrocele.    The patient states that he has had a several year history of scrotal swelling that has gotten worse.    He states no real pain but getting into a vehicle or sitting a certain way will cause some discomfort.      Patient had a testicular scrotal ultrasound performed on 9/26/2022 which reveals moderate right and large left hydroceles.    He states that there is a weighty sensation.     He does also report that he has issues with voiding secondary to feeling as if his penis is \"buried\"   Objective     Past Medical History:   Diagnosis Date   • Arthritis    • Back pain    • Bilateral ankle pain     NEUROPATHY   • Corns and callus    • Coronary artery disease     \"STIFF HEART\" ON BB, NO CARDIOLOGIST, PCP (-)CP OR SOA   • Diabetes mellitus type 2, controlled (HCC)    • Hammertoe    • Heel pain    • Hyperlipemia    • Hypertension    • Left foot pain    • Lymphedema     NO PROBLEMS LATELY   • Numbness in feet    • Pulmonary embolism (HCC) 2013    SADDLE PE, RESOLVED       Past Surgical History:   Procedure Laterality Date   • BACK SURGERY  11/2021    S1-L1 FUSION (TOTAL OF 2 SURGERY)   • CARPAL TUNNEL RELEASE Right    • COLONOSCOPY  2014   • CYST REMOVAL      BACK   • FOOT SURGERY Right     ACHELLIS TENDON/HAMMER TOE   • KNEE ARTHROSCOPY Right     DIAGNOSTIC   • TEMPORAL ARTERY BIOPSY Right 1/19/2022    Procedure: Right temporal artery biopsy;  Surgeon: Wilbert Jacob MD;  Location: [facility];  Service: Vascular;  Laterality: Right;         Current Outpatient Medications:   •  albuterol sulfate  (90 Base) MCG/ACT inhaler, 1 puff Every 6 (Six) Hours As Needed., Disp: , Rfl:   •  allopurinol (ZYLOPRIM) 300 MG tablet, Take 1 tablet by mouth Daily. (Patient taking differently: Take 1 tablet by mouth " Every Morning.), Disp: 90 tablet, Rfl: 0  •  amLODIPine (NORVASC) 5 MG tablet, TAKE 1 TABLET DAILY (Patient taking differently: Take 1 tablet by mouth Every Morning.), Disp: 90 tablet, Rfl: 3  •  atorvastatin (LIPITOR) 20 MG tablet, Take 1 tablet by mouth Daily. (Patient taking differently: Take 1 tablet by mouth Every Morning.), Disp: 90 tablet, Rfl: 0  •  Diclofenac Sodium (VOLTAREN) 1 % gel gel, Apply 1 application topically to the appropriate area as directed., Disp: , Rfl:   •  DULoxetine (CYMBALTA) 60 MG capsule, Take 1 capsule by mouth Daily., Disp: , Rfl:   •  Jardiance 10 MG tablet tablet, , Disp: , Rfl:   •  lisinopril (PRINIVIL,ZESTRIL) 40 MG tablet, Take 1 tablet by mouth Daily. (Patient taking differently: Take 1 tablet by mouth Every Morning.), Disp: 90 tablet, Rfl: 0  •  metFORMIN (GLUCOPHAGE) 1000 MG tablet, Take 1 tablet by mouth 2 (Two) Times a Day With Meals., Disp: , Rfl:   •  metoprolol succinate XL (TOPROL-XL) 200 MG 24 hr tablet, Take 1 tablet by mouth Daily. (Patient taking differently: Take 1 tablet by mouth Every Morning.), Disp: 90 tablet, Rfl: 0  •  pregabalin (LYRICA) 150 MG capsule, Take 1 capsule by mouth 2 (Two) Times a Day., Disp: 180 capsule, Rfl: 0  •  Semaglutide, 2 MG/DOSE, (Ozempic, 2 MG/DOSE,) 8 MG/3ML solution pen-injector, Inject 2 mg under the skin into the appropriate area as directed., Disp: , Rfl:   •  tamsulosin (FLOMAX) 0.4 MG capsule 24 hr capsule, Take 1 capsule by mouth Daily., Disp: , Rfl:     Allergies   Allergen Reactions   • Aspirin Anaphylaxis        Family History   Problem Relation Age of Onset   • Stroke Father    • Heart disease Brother    • Malig Hyperthermia Neg Hx        Social History     Socioeconomic History   • Marital status:    Tobacco Use   • Smoking status: Former     Types: Cigarettes     Quit date: 1998     Years since quittin.1   • Smokeless tobacco: Never   Vaping Use   • Vaping Use: Never used   Substance and Sexual Activity  "  • Alcohol use: Not Currently     Comment: VERY RARE   • Drug use: Never   • Sexual activity: Defer       Vital Signs:   Resp 14   Ht 180.3 cm (71\")   BMI 45.61 kg/m²      Physical Exam  Genitourinary:             Result Review :   The following data was reviewed by: YU Telles on 03/22/2023:  Results for orders placed or performed during the hospital encounter of 01/19/22   CBC (No Diff)    Specimen: Blood   Result Value Ref Range    WBC 15.84 (H) 3.40 - 10.80 10*3/mm3    RBC 4.04 (L) 4.14 - 5.80 10*6/mm3    Hemoglobin 11.5 (L) 13.0 - 17.7 g/dL    Hematocrit 34.7 (L) 37.5 - 51.0 %    MCV 85.9 79.0 - 97.0 fL    MCH 28.5 26.6 - 33.0 pg    MCHC 33.1 31.5 - 35.7 g/dL    RDW 13.6 12.3 - 15.4 %    RDW-SD 42.7 37.0 - 54.0 fl    MPV 11.2 6.0 - 12.0 fL    Platelets 336 140 - 450 10*3/mm3   Basic Metabolic Panel    Specimen: Blood   Result Value Ref Range    Glucose 266 (H) 65 - 99 mg/dL    BUN 22 8 - 23 mg/dL    Creatinine 1.05 0.76 - 1.27 mg/dL    Sodium 136 136 - 145 mmol/L    Potassium 4.0 3.5 - 5.2 mmol/L    Chloride 98 98 - 107 mmol/L    CO2 25.4 22.0 - 29.0 mmol/L    Calcium 8.9 8.6 - 10.5 mg/dL    eGFR Non African Amer 70 >60 mL/min/1.73    BUN/Creatinine Ratio 21.0 7.0 - 25.0    Anion Gap 12.6 5.0 - 15.0 mmol/L   POC Glucose Once    Specimen: Blood   Result Value Ref Range    Glucose 293 (H) 70 - 99 mg/dL   POC Glucose Once    Specimen: Blood   Result Value Ref Range    Glucose 231 (H) 70 - 99 mg/dL   ECG 12 Lead   Result Value Ref Range    QT Interval 428 ms   Tissue Pathology Exam    Specimen: Temporal Artery; Tissue   Result Value Ref Range    Case Report       Surgical Pathology Report                         Case: [id number]                                  Authorizing Provider:  Wilbert Jacob MD         Collected:           01/19/2022 05:06 PM          Ordering Location:     [facility] MAIN Received:            01/20/2022 04:54 AM                                 OR                   "                                                         Pathologist:           Clara Aquino DO                                                       Specimen:    Temporal Artery, RIGHT TEMPORAL ARTERY                                                     Clinical Information      Final Diagnosis       Right temporal artery, biopsy:   -   Negative for giant cell arteritis      Gross Description       Right temporal artery: Received in formalin is a cylindrical fragment of blood vessel measuring 2.2 cm in length and 0.4 cm in diameter. Sectioned, all 1A. CRE      Microscopic Description              Procedures        Assessment and Plan    Diagnoses and all orders for this visit:    1. Hydrocele, unspecified hydrocele type (Primary)        Given symptoms and physical exam the patient wishes to proceed with surgical consultation scrotal ultrasound reviewed    Large left hydrocele with compression of the testis on ultrasound, likely etiology of patient pain Ultrasound images reviewed and discussed with patent. Chronic hydrocele likely with component from inflammation given activity at work. Discussed hydrocele with patient at length. Management options provided with patient including definitive surgical repair versus conservative management. Risks, benefits, and alternatives discussed reach option. Given patient's symptoms wishes to pursue surgical management. Risks discussed including but not limited to bleeding, infection, damage to surrounding structures, recurrence of hydrocele, need for further procedures, and pain. Patient is understanding of these risks and is agreeable to proceed.      I spent 30 minutes caring for Jonas on this date of service. This time includes time spent by me in the following activities:reviewing tests, obtaining and/or reviewing a separately obtained history, performing a medically appropriate examination and/or evaluation , counseling and educating the patient/family/caregiver,  ordering medications, tests, or procedures, and documenting information in the medical record  Follow Up   Return for With Dr. Robertson for Consult for hydrocelectomy .  Patient was given instructions and counseling regarding his condition or for health maintenance advice. Please see specific information pulled into the AVS if appropriate.         This document has been electronically signed by YU Telles  March 22, 2023 16:19 EDT

## 2023-03-30 ENCOUNTER — OFFICE VISIT (OUTPATIENT)
Dept: UROLOGY | Facility: CLINIC | Age: 73
End: 2023-03-30
Payer: MEDICARE

## 2023-03-30 ENCOUNTER — PREP FOR SURGERY (OUTPATIENT)
Dept: OTHER | Facility: HOSPITAL | Age: 73
End: 2023-03-30
Payer: MEDICARE

## 2023-03-30 VITALS — HEIGHT: 71 IN | RESPIRATION RATE: 16 BRPM | BODY MASS INDEX: 43.82 KG/M2 | WEIGHT: 313 LBS

## 2023-03-30 DIAGNOSIS — N43.3 HYDROCELE, UNSPECIFIED HYDROCELE TYPE: Primary | ICD-10-CM

## 2023-03-30 DIAGNOSIS — E11.65 TYPE 2 DIABETES MELLITUS WITH HYPERGLYCEMIA, WITHOUT LONG-TERM CURRENT USE OF INSULIN: ICD-10-CM

## 2023-03-30 PROCEDURE — 99214 OFFICE O/P EST MOD 30 MIN: CPT | Performed by: UROLOGY

## 2023-03-30 PROCEDURE — 1160F RVW MEDS BY RX/DR IN RCRD: CPT | Performed by: UROLOGY

## 2023-03-30 PROCEDURE — 1159F MED LIST DOCD IN RCRD: CPT | Performed by: UROLOGY

## 2023-03-30 RX ORDER — CEFAZOLIN SODIUM 2 G/100ML
2 INJECTION, SOLUTION INTRAVENOUS ONCE
OUTPATIENT
Start: 2023-03-30 | End: 2023-03-30

## 2023-03-30 NOTE — PROGRESS NOTES
"    UROLOGY OFFICE follow-up NOTE    Subjective   HPI  Jonas Mercado is a 72 y.o. male.  Presents for follow-up of hydrocele, discussion of management.  Previously seen by urology ARNP, Anya Dowling.    The patient is feeling okay.  Notes significant bother with left hydrocele.  He occasionally has pain when getting in and out of a vehicle, which \"feels like he is getting kicked.\" His hydrocele has increased in size over the last 2 to 3 years.  States it is getting worse.   If he sits in a hard chair, he experiences significant discomfort, especially if he squirms and his testicle gets under him. He thinks the weight of the hydrocele is giving him problems. He has trouble with urination as his penis is \"turtling.\"    He had a vasectomy years ago.    The patient is a diabetic. He was prescribed steroids for a period of time, which raised his blood glucose.  Reports his most recent hemoglobin A1c was 8.3 percent. He was recently prescribed Ozempic.    Review of systems  A review of systems was performed, and positive findings are noted in the HPI.    Objective     Vital Signs:   There were no vitals taken for this visit.      Physical exam  No acute distress, well-nourished, BMI 43.65  Lungs: Clear, unlabored  CV: Regular rate, no chest retractions  Awake alert and oriented  Mood normal; affect normal  Walks with cane  : Large left hydrocele; unable to examine left testis secondary to hydrocele; no right testicular or hemiscrotal abnormalities      Results  PROCEDURE:  US SCROTUM AND TESTICLES     COMPARISON: None     INDICATIONS:  left scrotal swelling     TECHNIQUE:    The scrotum and testicles were evaluated with gray scale and color duplex Doppler   sonography.       FINDINGS:          The right testicle measures 3.5 x 3.6 x 2.8 cm and the left testicle measures 4.9 x 3.2 x 2.5 cm.   Testicular vascularity appears intact bilaterally. There is no evidence of an intratesticular mass.    There is moderate " right hydrocele enlarged left hydrocele.  There is no varicocele. The   epididymides appear unremarkable. Scrotal wall is within normal limits.        IMPRESSION:                 1. No intratesticular mass or testicular torsion.  2. Moderate right and large left hydroceles.            YARELI CARREON MD         Electronically Signed and Approved By: YARELI CARREON MD on 9/26/2022 at 14:42                Problem List:  Patient Active Problem List   Diagnosis   • Lymphedema   • Hyperlipemia   • Hammertoe   • Hypertension   • Spondylolisthesis at L5-S1 level   • Pulmonary embolism (HCC)   • High risk medication use   • Type 2 diabetes mellitus with hyperglycemia, without long-term current use of insulin (HCC)   • Gout   • Temporal arteritis (HCC)   • Bilateral primary osteoarthritis of knee   • BPH (benign prostatic hyperplasia)   • Headache   • Localized edema   • Lumbar adjacent segment disease with spondylolisthesis   • Spinal stenosis   • Nontraumatic complete tear of right rotator cuff       Assessment & Plan   Diagnoses and all orders for this visit:    1. Hydrocele, unspecified hydrocele type (Primary)        Large left hydrocele, obscuring left testis on examination, likely etiology of patient pain complaints.   Reports enlargement and worsening of symptoms with time.    Ultrasound images reviewed and discussed with patent.     Management options provided with patient including definitive surgical repair versus conservative management. Risks, benefits, and alternatives discussed of each option. Given patient's symptoms wishes to pursue surgical management. Risks discussed including but not limited to bleeding, infection, damage to surrounding structures, recurrence of hydrocele, need for further procedures, and pain.  Aware that this is a procedure performed under general anesthesia which has inherent risks including and up to death.  Discussed anticipated postoperative course, healing of approximately 4 to 6  weeks.  Also aware that he may have drain in place for period of time to assist with evacuation of inflammation.  Patient is understanding of these risks and is agreeable to proceed.    Discussed the importance of optimization of diabetic control prior to surgery to promote healing.    We will have his hemoglobin A1c checked to monitor his control of his diabetes.  Discussed that hemoglobin A1c should be less than 8 to proceed.    We will contact him with hemoglobin A1c result once obtained and to schedule him for surgery.    Schedule OR  all questions and concerns have been addressed at this time.          MDM moderate: 1 chronic illness with exacerbation, progression; decision regarding surgery including patient or procedure identified risk factors    Transcribed from ambient dictation for Nicky Robertson MD by Zaida Deluna.  03/30/23   14:04 EDT    Patient or patient representative verbalized consent to the visit recording.  I have personally performed the services described in this document as transcribed by the above individual, and it is both accurate and complete.

## 2023-03-31 ENCOUNTER — TELEPHONE (OUTPATIENT)
Dept: UROLOGY | Facility: CLINIC | Age: 73
End: 2023-03-31
Payer: MEDICARE

## 2023-03-31 PROBLEM — N43.3 HYDROCELE: Status: ACTIVE | Noted: 2023-03-31

## 2023-04-03 ENCOUNTER — LAB (OUTPATIENT)
Dept: LAB | Facility: HOSPITAL | Age: 73
End: 2023-04-03
Payer: MEDICARE

## 2023-04-03 DIAGNOSIS — E11.65 TYPE 2 DIABETES MELLITUS WITH HYPERGLYCEMIA, WITHOUT LONG-TERM CURRENT USE OF INSULIN: ICD-10-CM

## 2023-04-03 LAB — HBA1C MFR BLD: 8.2 % (ref 4.8–5.6)

## 2023-04-03 PROCEDURE — 36415 COLL VENOUS BLD VENIPUNCTURE: CPT

## 2023-04-03 PROCEDURE — 83036 HEMOGLOBIN GLYCOSYLATED A1C: CPT

## 2023-04-06 ENCOUNTER — OFFICE VISIT (OUTPATIENT)
Dept: PODIATRY | Facility: CLINIC | Age: 73
End: 2023-04-06
Payer: MEDICARE

## 2023-04-06 VITALS
BODY MASS INDEX: 43.82 KG/M2 | WEIGHT: 313 LBS | TEMPERATURE: 97.7 F | HEART RATE: 78 BPM | DIASTOLIC BLOOD PRESSURE: 72 MMHG | HEIGHT: 71 IN | OXYGEN SATURATION: 94 % | SYSTOLIC BLOOD PRESSURE: 147 MMHG

## 2023-04-06 DIAGNOSIS — M79.671 FOOT PAIN, BILATERAL: ICD-10-CM

## 2023-04-06 DIAGNOSIS — G62.9 NEUROPATHY: ICD-10-CM

## 2023-04-06 DIAGNOSIS — B35.1 ONYCHOMYCOSIS: Primary | ICD-10-CM

## 2023-04-06 DIAGNOSIS — L60.0 ONYCHOCRYPTOSIS: ICD-10-CM

## 2023-04-06 DIAGNOSIS — M79.672 FOOT PAIN, BILATERAL: ICD-10-CM

## 2023-04-06 DIAGNOSIS — E11.8 DIABETIC FOOT: ICD-10-CM

## 2023-04-06 DIAGNOSIS — E11.9 NON-INSULIN DEPENDENT TYPE 2 DIABETES MELLITUS: ICD-10-CM

## 2023-04-06 PROCEDURE — 3077F SYST BP >= 140 MM HG: CPT | Performed by: PODIATRIST

## 2023-04-06 PROCEDURE — 1159F MED LIST DOCD IN RCRD: CPT | Performed by: PODIATRIST

## 2023-04-06 PROCEDURE — G8404 LOW EXTEMITY NEUR EXAM DOCUM: HCPCS | Performed by: PODIATRIST

## 2023-04-06 PROCEDURE — 1160F RVW MEDS BY RX/DR IN RCRD: CPT | Performed by: PODIATRIST

## 2023-04-06 PROCEDURE — 11721 DEBRIDE NAIL 6 OR MORE: CPT | Performed by: PODIATRIST

## 2023-04-06 PROCEDURE — 3078F DIAST BP <80 MM HG: CPT | Performed by: PODIATRIST

## 2023-04-06 RX ORDER — SULFAMETHOXAZOLE AND TRIMETHOPRIM 800; 160 MG/1; MG/1
TABLET ORAL
COMMUNITY
Start: 2023-04-05

## 2023-04-06 NOTE — PROGRESS NOTES
"    [facility]    Today's Date: 04/06/23    Patient Name: Jonas Mercado  MRN: [id number]  CSN: [id number]  PCP: Tristan Phipps, Last PCP Visit: 3/30/2023  Referring Provider: No ref. provider found    SUBJECTIVE     Chief Complaint   Patient presents with   • Left Foot - Follow-up, Nail Problem   • Right Foot - Follow-up, Nail Problem     HPI: Jonas Mercado, a 72 y.o.male, comes to clinic.    New, Established, New Problem:  established     Location:  Toenails    Duration:   Greater than five years    Onset:  Gradual    Nature:  sore with palpation.    Stable, worsening, improving:   Stable    Aggravating factors:  Pain with shoe gear and ambulation.    Previous Treatment:  debridement  __________________________________    NIDDM    Patient reported last blood glucose: 128    Patient states their most recent HgB A1C was 7.2.  __________________________________    Patient reports the following medical changes since their last visit: Tx for UTI.    Patient denies any fevers, chills, nausea, vomiting, shortness of breathe, nor any other constitutional signs nor symptoms.       Past Medical History:   Diagnosis Date   • Arthritis    • Back pain    • Bilateral ankle pain     NEUROPATHY   • Corns and callus    • Coronary artery disease     \"STIFF HEART\" ON BB, NO CARDIOLOGIST, PCP (-)CP OR SOA   • Diabetes mellitus type 2, controlled    • Hammertoe    • Heel pain    • Hyperlipemia    • Hypertension    • Left foot pain    • Lymphedema     NO PROBLEMS LATELY   • Numbness in feet    • Pulmonary embolism 2013    SADDLE PE, RESOLVED     Past Surgical History:   Procedure Laterality Date   • BACK SURGERY  11/2021    S1-L1 FUSION (TOTAL OF 2 SURGERY)   • CARPAL TUNNEL RELEASE Right    • COLONOSCOPY  2014   • CYST REMOVAL      BACK   • FOOT SURGERY Right     ACHELLIS TENDON/HAMMER TOE   • KNEE ARTHROSCOPY Right     DIAGNOSTIC   • TEMPORAL ARTERY BIOPSY Right 1/19/2022    Procedure: Right temporal " artery biopsy;  Surgeon: Wilbert Jacob MD;  Location: [facility] OR;  Service: Vascular;  Laterality: Right;     Family History   Problem Relation Age of Onset   • Stroke Father    • Heart disease Brother    • Malig Hyperthermia Neg Hx      Social History     Socioeconomic History   • Marital status:    Tobacco Use   • Smoking status: Former     Types: Cigarettes     Quit date: 1998     Years since quittin.1   • Smokeless tobacco: Never   Vaping Use   • Vaping Use: Never used   Substance and Sexual Activity   • Alcohol use: Not Currently     Comment: VERY RARE   • Drug use: Never   • Sexual activity: Defer     Allergies   Allergen Reactions   • Aspirin Anaphylaxis     Current Outpatient Medications   Medication Sig Dispense Refill   • albuterol sulfate  (90 Base) MCG/ACT inhaler 1 puff Every 6 (Six) Hours As Needed.     • allopurinol (ZYLOPRIM) 300 MG tablet Take 1 tablet by mouth Daily. (Patient taking differently: Take 1 tablet by mouth Every Morning.) 90 tablet 0   • amLODIPine (NORVASC) 5 MG tablet TAKE 1 TABLET DAILY (Patient taking differently: Take 1 tablet by mouth Every Morning.) 90 tablet 3   • atorvastatin (LIPITOR) 20 MG tablet Take 1 tablet by mouth Daily. (Patient taking differently: Take 1 tablet by mouth Every Morning.) 90 tablet 0   • Diclofenac Sodium (VOLTAREN) 1 % gel gel Apply 1 application topically to the appropriate area as directed.     • DULoxetine (CYMBALTA) 60 MG capsule Take 1 capsule by mouth Daily.     • Jardiance 10 MG tablet tablet      • lisinopril (PRINIVIL,ZESTRIL) 40 MG tablet Take 1 tablet by mouth Daily. (Patient taking differently: Take 1 tablet by mouth Every Morning.) 90 tablet 0   • metFORMIN (GLUCOPHAGE) 1000 MG tablet Take 1 tablet by mouth 2 (Two) Times a Day With Meals.     • metoprolol succinate XL (TOPROL-XL) 200 MG 24 hr tablet Take 1 tablet by mouth Daily. (Patient taking differently: Take 1 tablet by mouth Every Morning.) 90 tablet 0   •  pregabalin (LYRICA) 150 MG capsule Take 1 capsule by mouth 2 (Two) Times a Day. 180 capsule 0   • Semaglutide, 2 MG/DOSE, (Ozempic, 2 MG/DOSE,) 8 MG/3ML solution pen-injector Inject 2 mg under the skin into the appropriate area as directed.     • sulfamethoxazole-trimethoprim (BACTRIM DS,SEPTRA DS) 800-160 MG per tablet      • tamsulosin (FLOMAX) 0.4 MG capsule 24 hr capsule Take 1 capsule by mouth Daily.       No current facility-administered medications for this visit.     Review of Systems   Constitutional: Negative.    Skin:        Toenails 1 through 5 bilaterally   Neurological: Positive for numbness.   All other systems reviewed and are negative.      OBJECTIVE     Vitals:    23 1249   BP: 147/72   Pulse: 78   Temp: 97.7 °F (36.5 °C)   SpO2: 94%       Lab Results   Component Value Date    HGBA1C 8.20 (H) 2023       Lab Results   Component Value Date    GLUCOSE 266 (H) 2022    CALCIUM 8.9 2022     2022    K 4.0 2022    CO2 25.4 2022    CL 98 2022    BUN 22 2022    CREATININE 1.05 2022    EGFRIFNONA 70 2022    BCR 21.0 2022    ANIONGAP 12.6 2022       Patient seen in no apparent distress.      PHYSICAL EXAM:     Foot/Ankle Exam:       General:   Diabetic Foot Exam Performed    Appearance: obesity and elderly    Orientation: AAOx3    Affect: appropriate    Shoe Gear:  Casual shoes    VASCULAR      Right Foot Vascularity   Dorsalis pedis:  1+  Posterior tibial:  1+  Skin Temperature: cool    Edema Grading:  Pitting and 2+  CFT:  3  Pedal Hair Growth:  Absent  Varicosities: mild varicosities       Left Foot Vascularity   Dorsalis pedis:  1+  Posterior tibial:  1+  Skin Temperature: cool    Edema Gradin+ and pitting  CFT:  3  Pedal Hair Growth:  Absent  Varicosities: mild varicosities        NEUROLOGIC     Right Foot Neurologic   Light touch sensation:  Diminished  Vibratory sensation:  Diminished  Hot/Cold sensation: diminished     Protective Sensation using [place] Monofilament:  Diminished     Left Foot Neurologic   Light touch sensation:  Diminished  Vibratory sensation:  Diminished  Hot/cold sensation: diminished    Protective Sensation using [place]yl Monofilament:  Diminished     MUSCLE STRENGTH     Right Foot Muscle Strength   Foot dorsiflexion:  4  Foot plantar flexion:  4  Foot inversion:  4  Foot eversion:  4     Left Foot Muscle Strength   Foot dorsiflexion:  4  Foot plantar flexion:  4  Foot inversion:  4  Foot eversion:  4     DERMATOLOGIC     Right Foot Dermatologic   Skin: skin intact    Nails: onychomycosis, abnormally thick, subungual debris, dystrophic nails and ingrown toenail    Nails comment:  Toenails 1 through 5     Left Foot Dermatologic   Skin: skin intact    Nails: onychomycosis, abnormally thick, subungual debris, dystrophic nails and ingrown toenail    Nails comment:  Toenails 1 through 5      ASSESSMENT/PLAN     Diagnoses and all orders for this visit:    1. Onychomycosis (Primary)    2. Diabetic foot    3. Foot pain, bilateral    4. Non-insulin dependent type 2 diabetes mellitus    5. Onychocryptosis    6. Neuropathy        Comprehensive lower extremity examination and evaluation was performed.    Discussed findings and treatment plan including risks, benefits, and treatment options with patient in detail. Patient agreed with treatment plan.    Toenails 1 through 5 bilaterally were debrided in thickness and length and then smoothed with a Dremel Tool.  Tolerated the procedure well without complications.    Diabetic foot exam performed and documented this date, compliant with CQM required standards. Detail of findings as noted in physical exam.  Lower extremity Neurologic exam for diabetic patient performed and documented this date, compliant with PQRS required standards. Detail of findings as noted in physical exam.  Advised patient importance of good routine lower extremity hygiene. Advised  patient importance of evaluating for intact skin and pain free nail borders.  Advised patient to use mirror to evaluate plantar/ soles of feet for better visualization. Advised patient monitor and phone office to be seen if any cracking to skin, open lesions, painful nail borders or if nails become elongated prior to next visit. Advised patient importance of daily cleansing of lower extremities, followed by good skin cream to maintain normal hydration of skin. Also advised patient importance of close daily monitoring of blood sugar. Advised to regulate diet and medications to maintain control of blood sugar in optimal range. Contact primary care provider if difficulties maintaining blood sugar levels.  Advised Patient of presence of Diabetes Mellitus condition.  Advised Patient risk of progression and worsening or improvement, then return of condition.  Will monitor condition for any change in future. Treat with most appropriate treatment pending status of condition.  Counseled and advised patient extensively on nature and ramifications of diabetes. Standard instructions given to patient for good diabetic foot care and maintenance. Advised importance of careful monitoring to avoid break down and complications secondary to diabetes. Advised patient importance of strict maintenance of blood sugar control. Advised patient of possible ominous results from neglect of condition, i.e.: amputation/ loss of digits, feet and legs, or even death.  Patient states understands counseling, will monitor closely, continue good hygiene and routine diabetic foot care. Patient will contact office is questions or problems.      An After Visit Summary was printed and given to the patient at discharge, including (if requested) any available informative/educational handouts regarding diagnosis, treatment, or medications. All questions were answered to patient/family satisfaction. Should symptoms fail to improve or worsen they agree to call  or return to clinic or to go to the Emergency Department. Discussed the importance of following up with any needed screening tests/labs/specialist appointments and any requested follow-up recommended by me today. Importance of maintaining follow-up discussed and patient accepts that missed appointments can delay diagnosis and potentially lead to worsening of conditions.    Return in about 9 weeks (around 6/8/2023) for Toenail Care., or sooner if acute issues arise.    This document has been electronically signed by Toby Gonzalez DPM on April 6, 2023 13:15 EDT

## 2023-05-11 RX ORDER — TAMSULOSIN HYDROCHLORIDE 0.4 MG/1
1 CAPSULE ORAL DAILY
COMMUNITY

## 2023-05-11 NOTE — PRE-PROCEDURE INSTRUCTIONS
PATIENT INSTRUCTED TO BE:    - NPO AFTER MIDNIGHT EXCEPT CAN HAVE CLEAR LIQUIDS 2 HOURS PRIOR TO SURGERY ARRIVAL TIME     - TO HOLD ALL VITAMINS, SUPPLEMENTS, NSAIDS FOR ONE WEEK PRIOR TO THEIR SURGICAL PROCEDURE    - INSTRUCTED PT TO USE SURGICAL SOAP 1 TIME THE NIGHT PRIOR TO SURGERY OR THE AM OF SURGERY.   USE SOAP FROM NECK TO TOES AVOID THEIR FACE, HAIR, AND PRIVATE PARTS. INSTRUCTED NO LOTIONS, JEWELRY, PIERCINGS, OR DEODORANT DAY OF SURGERY    - IF DIABETIC, CHECK BLOOD GLUCOSE IF LESS THAN 70 CALL PREOP AREA -AM OF SURGERY     - INSTRUCTED TO TAKE THE FOLLOWING MEDICATIONS THE DAY OF SURGERY:      INHALERS, ALLOPURINOL, NORVASC, LIPITOR. CYMBALTA, METOPROLOL, LYRICA, FLOMAX,     TAKE METFORMIN BY 6PM THE NIGHT PRIOR TO SURGERY     PATIENT VERBALIZED UNDERSTANDING

## 2023-05-12 ENCOUNTER — ANESTHESIA EVENT (OUTPATIENT)
Dept: PERIOP | Facility: HOSPITAL | Age: 73
End: 2023-05-12
Payer: MEDICARE

## 2023-05-12 ENCOUNTER — HOSPITAL ENCOUNTER (OUTPATIENT)
Facility: HOSPITAL | Age: 73
Setting detail: HOSPITAL OUTPATIENT SURGERY
Discharge: HOME | End: 2023-05-12
Attending: UROLOGY | Admitting: UROLOGY
Payer: MEDICARE

## 2023-05-12 ENCOUNTER — ANESTHESIA (OUTPATIENT)
Dept: PERIOP | Facility: HOSPITAL | Age: 73
End: 2023-05-12
Payer: MEDICARE

## 2023-05-12 VITALS
BODY MASS INDEX: 40.65 KG/M2 | OXYGEN SATURATION: 91 % | WEIGHT: 290.35 LBS | HEIGHT: 71 IN | HEART RATE: 86 BPM | RESPIRATION RATE: 16 BRPM | SYSTOLIC BLOOD PRESSURE: 143 MMHG | DIASTOLIC BLOOD PRESSURE: 81 MMHG | TEMPERATURE: 97.8 F

## 2023-05-12 DIAGNOSIS — N43.3 HYDROCELE, UNSPECIFIED HYDROCELE TYPE: ICD-10-CM

## 2023-05-12 LAB
GLUCOSE BLDC GLUCOMTR-MCNC: 146 MG/DL (ref 70–99)
GLUCOSE BLDC GLUCOMTR-MCNC: 165 MG/DL (ref 70–99)

## 2023-05-12 PROCEDURE — 25010000002 CEFAZOLIN PER 500 MG: Performed by: UROLOGY

## 2023-05-12 PROCEDURE — 25010000002 MIDAZOLAM PER 1MG: Performed by: ANESTHESIOLOGY

## 2023-05-12 PROCEDURE — 93005 ELECTROCARDIOGRAM TRACING: CPT | Performed by: ANESTHESIOLOGY

## 2023-05-12 PROCEDURE — 25010000002 SUGAMMADEX 200 MG/2ML SOLUTION: Performed by: NURSE ANESTHETIST, CERTIFIED REGISTERED

## 2023-05-12 PROCEDURE — 25010000002 ONDANSETRON PER 1 MG: Performed by: NURSE ANESTHETIST, CERTIFIED REGISTERED

## 2023-05-12 PROCEDURE — 55040 REMOVAL OF HYDROCELE: CPT | Performed by: UROLOGY

## 2023-05-12 PROCEDURE — S0260 H&P FOR SURGERY: HCPCS | Performed by: UROLOGY

## 2023-05-12 PROCEDURE — 25010000002 DEXAMETHASONE PER 1 MG: Performed by: NURSE ANESTHETIST, CERTIFIED REGISTERED

## 2023-05-12 PROCEDURE — 25010000002 FENTANYL CITRATE (PF) 50 MCG/ML SOLUTION: Performed by: NURSE ANESTHETIST, CERTIFIED REGISTERED

## 2023-05-12 PROCEDURE — 82948 REAGENT STRIP/BLOOD GLUCOSE: CPT

## 2023-05-12 PROCEDURE — 25010000002 PROPOFOL 10 MG/ML EMULSION: Performed by: NURSE ANESTHETIST, CERTIFIED REGISTERED

## 2023-05-12 RX ORDER — SODIUM CHLORIDE 0.9 % (FLUSH) 0.9 %
10 SYRINGE (ML) INJECTION AS NEEDED
Status: DISCONTINUED | OUTPATIENT
Start: 2023-05-12 | End: 2023-05-12 | Stop reason: HOSPADM

## 2023-05-12 RX ORDER — ONDANSETRON 2 MG/ML
4 INJECTION INTRAMUSCULAR; INTRAVENOUS ONCE AS NEEDED
Status: DISCONTINUED | OUTPATIENT
Start: 2023-05-12 | End: 2023-05-12 | Stop reason: HOSPADM

## 2023-05-12 RX ORDER — SUCCINYLCHOLINE/SOD CL,ISO/PF 100 MG/5ML
SYRINGE (ML) INTRAVENOUS AS NEEDED
Status: DISCONTINUED | OUTPATIENT
Start: 2023-05-12 | End: 2023-05-12 | Stop reason: SURG

## 2023-05-12 RX ORDER — SODIUM CHLORIDE 9 MG/ML
40 INJECTION, SOLUTION INTRAVENOUS AS NEEDED
Status: DISCONTINUED | OUTPATIENT
Start: 2023-05-12 | End: 2023-05-12 | Stop reason: HOSPADM

## 2023-05-12 RX ORDER — MEPERIDINE HYDROCHLORIDE 25 MG/ML
12.5 INJECTION INTRAMUSCULAR; INTRAVENOUS; SUBCUTANEOUS
Status: DISCONTINUED | OUTPATIENT
Start: 2023-05-12 | End: 2023-05-12 | Stop reason: HOSPADM

## 2023-05-12 RX ORDER — ONDANSETRON 2 MG/ML
INJECTION INTRAMUSCULAR; INTRAVENOUS AS NEEDED
Status: DISCONTINUED | OUTPATIENT
Start: 2023-05-12 | End: 2023-05-12 | Stop reason: SURG

## 2023-05-12 RX ORDER — PROPOFOL 10 MG/ML
VIAL (ML) INTRAVENOUS AS NEEDED
Status: DISCONTINUED | OUTPATIENT
Start: 2023-05-12 | End: 2023-05-12 | Stop reason: SURG

## 2023-05-12 RX ORDER — OXYCODONE HYDROCHLORIDE AND ACETAMINOPHEN 5; 325 MG/1; MG/1
1-2 TABLET ORAL EVERY 6 HOURS PRN
Qty: 20 TABLET | Refills: 0 | Status: SHIPPED | OUTPATIENT
Start: 2023-05-12

## 2023-05-12 RX ORDER — PROMETHAZINE HYDROCHLORIDE 25 MG/1
25 SUPPOSITORY RECTAL ONCE AS NEEDED
Status: DISCONTINUED | OUTPATIENT
Start: 2023-05-12 | End: 2023-05-12 | Stop reason: HOSPADM

## 2023-05-12 RX ORDER — OXYCODONE HYDROCHLORIDE 5 MG/1
5 TABLET ORAL
Status: DISCONTINUED | OUTPATIENT
Start: 2023-05-12 | End: 2023-05-12 | Stop reason: HOSPADM

## 2023-05-12 RX ORDER — MAGNESIUM HYDROXIDE 1200 MG/15ML
LIQUID ORAL AS NEEDED
Status: DISCONTINUED | OUTPATIENT
Start: 2023-05-12 | End: 2023-05-12 | Stop reason: HOSPADM

## 2023-05-12 RX ORDER — MIDAZOLAM HYDROCHLORIDE 2 MG/2ML
2 INJECTION, SOLUTION INTRAMUSCULAR; INTRAVENOUS
Status: DISCONTINUED | OUTPATIENT
Start: 2023-05-12 | End: 2023-05-12 | Stop reason: HOSPADM

## 2023-05-12 RX ORDER — BUPIVACAINE HYDROCHLORIDE 5 MG/ML
INJECTION, SOLUTION EPIDURAL; INTRACAUDAL AS NEEDED
Status: DISCONTINUED | OUTPATIENT
Start: 2023-05-12 | End: 2023-05-12 | Stop reason: HOSPADM

## 2023-05-12 RX ORDER — IBUPROFEN 600 MG/1
600 TABLET ORAL EVERY 6 HOURS PRN
Status: DISCONTINUED | OUTPATIENT
Start: 2023-05-12 | End: 2023-05-12

## 2023-05-12 RX ORDER — ACETAMINOPHEN 500 MG
1000 TABLET ORAL ONCE
Status: COMPLETED | OUTPATIENT
Start: 2023-05-12 | End: 2023-05-12

## 2023-05-12 RX ORDER — ROCURONIUM BROMIDE 10 MG/ML
INJECTION, SOLUTION INTRAVENOUS AS NEEDED
Status: DISCONTINUED | OUTPATIENT
Start: 2023-05-12 | End: 2023-05-12 | Stop reason: SURG

## 2023-05-12 RX ORDER — DEXAMETHASONE SODIUM PHOSPHATE 4 MG/ML
INJECTION, SOLUTION INTRA-ARTICULAR; INTRALESIONAL; INTRAMUSCULAR; INTRAVENOUS; SOFT TISSUE AS NEEDED
Status: DISCONTINUED | OUTPATIENT
Start: 2023-05-12 | End: 2023-05-12 | Stop reason: SURG

## 2023-05-12 RX ORDER — PROMETHAZINE HYDROCHLORIDE 12.5 MG/1
25 TABLET ORAL ONCE AS NEEDED
Status: DISCONTINUED | OUTPATIENT
Start: 2023-05-12 | End: 2023-05-12 | Stop reason: HOSPADM

## 2023-05-12 RX ORDER — LIDOCAINE HYDROCHLORIDE 20 MG/ML
INJECTION, SOLUTION EPIDURAL; INFILTRATION; INTRACAUDAL; PERINEURAL AS NEEDED
Status: DISCONTINUED | OUTPATIENT
Start: 2023-05-12 | End: 2023-05-12 | Stop reason: SURG

## 2023-05-12 RX ORDER — PROMETHAZINE HYDROCHLORIDE 12.5 MG/1
12.5 TABLET ORAL ONCE AS NEEDED
Status: DISCONTINUED | OUTPATIENT
Start: 2023-05-12 | End: 2023-05-12 | Stop reason: HOSPADM

## 2023-05-12 RX ORDER — ONDANSETRON 4 MG/1
4 TABLET, FILM COATED ORAL ONCE AS NEEDED
Status: DISCONTINUED | OUTPATIENT
Start: 2023-05-12 | End: 2023-05-12 | Stop reason: HOSPADM

## 2023-05-12 RX ORDER — FENTANYL CITRATE 50 UG/ML
INJECTION, SOLUTION INTRAMUSCULAR; INTRAVENOUS AS NEEDED
Status: DISCONTINUED | OUTPATIENT
Start: 2023-05-12 | End: 2023-05-12 | Stop reason: SURG

## 2023-05-12 RX ORDER — GINSENG 100 MG
CAPSULE ORAL AS NEEDED
Status: DISCONTINUED | OUTPATIENT
Start: 2023-05-12 | End: 2023-05-12 | Stop reason: HOSPADM

## 2023-05-12 RX ORDER — SODIUM CHLORIDE, SODIUM LACTATE, POTASSIUM CHLORIDE, CALCIUM CHLORIDE 600; 310; 30; 20 MG/100ML; MG/100ML; MG/100ML; MG/100ML
9 INJECTION, SOLUTION INTRAVENOUS CONTINUOUS PRN
Status: DISCONTINUED | OUTPATIENT
Start: 2023-05-12 | End: 2023-05-12 | Stop reason: HOSPADM

## 2023-05-12 RX ORDER — ACETAMINOPHEN 325 MG/1
650 TABLET ORAL ONCE
Status: DISCONTINUED | OUTPATIENT
Start: 2023-05-12 | End: 2023-05-12 | Stop reason: HOSPADM

## 2023-05-12 RX ORDER — SODIUM CHLORIDE 0.9 % (FLUSH) 0.9 %
10 SYRINGE (ML) INJECTION EVERY 12 HOURS SCHEDULED
Status: DISCONTINUED | OUTPATIENT
Start: 2023-05-12 | End: 2023-05-12 | Stop reason: HOSPADM

## 2023-05-12 RX ORDER — CEFAZOLIN SODIUM IN 0.9 % NACL 3 G/100 ML
3 INTRAVENOUS SOLUTION, PIGGYBACK (ML) INTRAVENOUS ONCE
Status: COMPLETED | OUTPATIENT
Start: 2023-05-12 | End: 2023-05-12

## 2023-05-12 RX ADMIN — DEXAMETHASONE SODIUM PHOSPHATE 4 MG: 4 INJECTION, SOLUTION INTRA-ARTICULAR; INTRALESIONAL; INTRAMUSCULAR; INTRAVENOUS; SOFT TISSUE at 07:27

## 2023-05-12 RX ADMIN — SODIUM CHLORIDE, POTASSIUM CHLORIDE, SODIUM LACTATE AND CALCIUM CHLORIDE: 600; 310; 30; 20 INJECTION, SOLUTION INTRAVENOUS at 08:45

## 2023-05-12 RX ADMIN — Medication 3 G: at 07:26

## 2023-05-12 RX ADMIN — ONDANSETRON 4 MG: 2 INJECTION INTRAMUSCULAR; INTRAVENOUS at 09:15

## 2023-05-12 RX ADMIN — LIDOCAINE HYDROCHLORIDE 100 MG: 20 INJECTION, SOLUTION EPIDURAL; INFILTRATION; INTRACAUDAL; PERINEURAL at 07:32

## 2023-05-12 RX ADMIN — ONDANSETRON 4 MG: 2 INJECTION INTRAMUSCULAR; INTRAVENOUS at 08:12

## 2023-05-12 RX ADMIN — SODIUM CHLORIDE, POTASSIUM CHLORIDE, SODIUM LACTATE AND CALCIUM CHLORIDE 9 ML/HR: 600; 310; 30; 20 INJECTION, SOLUTION INTRAVENOUS at 06:58

## 2023-05-12 RX ADMIN — ROCURONIUM BROMIDE 5 MG: 10 INJECTION, SOLUTION INTRAVENOUS at 07:32

## 2023-05-12 RX ADMIN — MIDAZOLAM HYDROCHLORIDE 2 MG: 1 INJECTION, SOLUTION INTRAMUSCULAR; INTRAVENOUS at 07:23

## 2023-05-12 RX ADMIN — SUGAMMADEX 200 MG: 100 INJECTION, SOLUTION INTRAVENOUS at 08:12

## 2023-05-12 RX ADMIN — ACETAMINOPHEN 1000 MG: 500 TABLET ORAL at 06:57

## 2023-05-12 RX ADMIN — PROPOFOL 180 MG: 10 INJECTION, EMULSION INTRAVENOUS at 07:32

## 2023-05-12 RX ADMIN — ROCURONIUM BROMIDE 45 MG: 10 INJECTION, SOLUTION INTRAVENOUS at 07:42

## 2023-05-12 RX ADMIN — FENTANYL CITRATE 100 MCG: 50 INJECTION, SOLUTION INTRAMUSCULAR; INTRAVENOUS at 07:47

## 2023-05-12 RX ADMIN — Medication 100 MG: at 07:32

## 2023-05-12 NOTE — ANESTHESIA POSTPROCEDURE EVALUATION
Patient: Jonas Mercado    Procedure Summary     Date: 05/12/23 Room / Location: [facility] OR 08 / [facility] MAIN OR    Anesthesia Start: 0726 Anesthesia Stop: 0851    Procedure: HYDROCELECTOMY, left (Left: Scrotum) Diagnosis:       Hydrocele, unspecified hydrocele type      (Hydrocele, unspecified hydrocele type [N43.3])    Surgeons: Nicky Robertson MD Provider: Alex Leslie CRNA    Anesthesia Type: general ASA Status: 3          Anesthesia Type: general    Vitals  Vitals Value Taken Time   /83 05/12/23 0945   Temp 36.2 °C (97.2 °F) 05/12/23 0945   Pulse 88 05/12/23 0945   Resp 16 05/12/23 0945   SpO2 92 % 05/12/23 0945           Post Anesthesia Care and Evaluation    Patient location during evaluation: bedside  Patient participation: complete - patient participated  Level of consciousness: awake  Pain management: adequate    Airway patency: patent  PONV Status: none  Cardiovascular status: acceptable and stable  Respiratory status: acceptable  Hydration status: acceptable    Comments: An Anesthesiologist personally participated in the most demanding procedures (including induction and emergence if applicable) in the anesthesia plan, monitored the course of anesthesia administration at frequent intervals and remained physically present and available for immediate diagnosis and treatment of emergencies.

## 2023-05-12 NOTE — DISCHARGE INSTRUCTIONS
DISCHARGE INSTRUCTIONS  SURGICAL / AMBULATORY  PROCEDURES      For your surgery you had:  General anesthesia (you may have a sore throat for the first 24 hours)  Local anesthesia  You may experience dizziness, drowsiness, or light-headedness for several hours following surgery/procedure.  Do not stay alone today or tonight.  Limit your activity for 24 hours.  Resume your diet slowly.  Follow whatever special dietary instructions you may have been given by your doctor.  You should not drive or operate machinery, drink alcohol, or sign legally binding documents for 24 hours or while you are taking pain medication.    NOTIFY YOUR DOCTOR IF YOU EXPERIENCE ANY OF THE FOLLOWING:  Temperature greater than 101 degrees Fahrenheit  Shaking Chills  Redness or excessive drainage from incision  Nausea, vomiting and/or pain that is not controlled by prescribed medications  Increase in bleeding or bleeding that is excessive  Unable to urinate in 6 hours after surgery  If unable to reach your doctor, please go to the closest Emergency Room  You may begin dressing changes:     [] in 24 hours   [] in 48 hours   [x] Other: change as needed  You may shower tomorrow. Do not submerge incision or drain until released by MD.  Apply an ice pack 24-48 hours.        SPECIAL INSTRUCTIONS:  Follow Dr. Robertson's instructions on After Visit Summary.    No strenuous activity or heavy lifting over 25 pounds.  No sexual activity until follow-up.      Last dose of pain medication was given at:   Tylenol (1000mg) last at 7am. Do not exceed 4000mg of tylenol in a 24 hour period.

## 2023-05-12 NOTE — OP NOTE
Preoperative diagnosis  Left hydrocele    Postoperative diagnosis  Left hydrocele    Procedure performed  Left hydrocelectomy    Attending surgeon  Nicky Robertson MD     Assistant  Kyle Alegre RN    Anesthesia  MAC    EBL  5 mL    Complications  None    Specimen  None    Findings  300 mL left hydrocele; normal-appearing testis and cord    Indications  72 y.o. male agreed to undergo the above named procedure after discussion of the alternatives, risks and benefits.   Informed consent was obtained.      Procedure  The patient was brought to the operating   room and general anesthesia was administered.  He was placed in the   supine position, and his pressure points were padded appropriately.    His genitals were shaved and then prepped and draped in the standard   sterile fashion.  Preoperative antibiotics were given, and a time-out   was performed.     A 5 cm, approximately, midline scrotal incision was performed, and the left subcutaneous and dartos tissues were   dissected down with electrocautery until reaching the tunica   vaginalis and the hydrocele sac.  At this point, manual dissection   was performed surrounding the entire hydrocele, to free it from the   surrounding tissues, and then to the testicle and hydrocele were   delivered out of the skin incision.  At this time, the tissues   adjacent to the hydrocele were once again peeled off with the use of   a Ray-Sharita gauze, towards the spermatic cord.  After this, the tunica   vaginalis was incised sharply, and the hydrocele fluid was drained.    Approximately, 300 mL of hydrocele fluid was extracted.  After this,   the hydrocele sac was then incised and opened, and the redundant   portions were excised with judicious electrocautery, with careful   attention to leave an appropriate margin, off of the testicle,   epididymis and spermatic cord.  After this, we used 2-0 Vicryl suture   to invert and coapt the edges of the hydrocele sac, and the posterior    portion of the testicle and spermatic cord.  This was loosely sutured   in a running fashion for hemostasis.  Also, some of the edges of the   hydrocele sac were cauterized judiciously.  After this, the left   hemiscrotum was copiously irrigated, and it was monitored for areas   of active bleeding.  These areas were spotted and cauterized to   achieve appropriate hemostasis.  After this, the spermatic cord block was performed with Marcaine and   testicle were placed back into the left hemiscrotum, with careful   attention to be in the appropriate position without any twisting.  We   did a 1 cm incision in the inferior aspect of the left hemiscrotum,   and passed a Penrose drain for drainage.  This was secured   to the skin with a 2-0 silk suture.  At this point, we closed the   dartos layer with a 2-0 running chromic suture, with careful   attention to obliterate as much of the dead space as possible.  After   this, the skin incision was closed with Monocryl.  Further local was placed at the incision.  Dermabond was applied over the incision and drain dressing placed.  Fluffs and scrotal support applied.  Procedure was then deemed terminated.  The patient awoke from anesthesia in stable   condition and transferred to recovery.  All suture, sponge and instrument counts were   correct x2.     The first assist, Kyle Alegre RN, was present and actively participated throughout the case.      Signed:  Nicky Robertson MD  05/12/23  09:08 EDT

## 2023-05-12 NOTE — ANESTHESIA PREPROCEDURE EVALUATION
Anesthesia Evaluation     no history of anesthetic complications:  NPO Solid Status: > 6 hours  NPO Liquid Status: > 6 hours           Airway   Mallampati: II  TM distance: >3 FB  Neck ROM: full  No difficulty expected  Dental - normal exam     Pulmonary - normal exam   (+) pulmonary embolism (2013, No anticoagulation currently),   Cardiovascular - normal exam    ECG reviewed    (+) hypertension, dysrhythmias (RBBB, LAFB), hyperlipidemia,       Neuro/Psych- negative ROS  GI/Hepatic/Renal/Endo    (+) morbid obesity,  diabetes mellitus type 2,     Musculoskeletal     (+) back pain,   Abdominal  - normal exam   Substance History - negative use     OB/GYN negative ob/gyn ROS         Other - negative ROS                       Anesthesia Plan    ASA 3     general     intravenous induction     Anesthetic plan, risks, benefits, and alternatives have been provided, discussed and informed consent has been obtained with: patient.    Plan discussed with CRNA and attending.        CODE STATUS:

## 2023-05-12 NOTE — H&P
"  [facility]   Urology Preop H&P Note    Patient Name: Jonas Mercado  : 1950  MRN: [id number]  Primary Care Physician:  Tristan Phipps  Referring Physician: Nicky Robertson MD  Date of admission: 2023    Subjective   Subjective     Reason for Consult/ Chief Complaint: Hydrocele, unspecified hydrocele type [N43.3]    HPI:  Jonas Mercado is a 72 y.o. male history ofHydrocele, unspecified hydrocele type [N43.3] who presents for further management OR.  Presents for planned Procedure(s):  HYDROCELECTOMY, left;  .  Procedure to be preformed on the left.  Risk, benefits, and alternatives discussed with patient prior to today.All questions were addressed after providing time for discussion.  Patient denies significant changes since last visit.  No new complaints today.    Review of Systems   All systems were reviewed and negative except for the above  Personal History     Past Medical History:   Diagnosis Date   • Arthritis    • Back pain    • Bilateral ankle pain     NEUROPATHY   • Corns and callus    • Coronary artery disease     \"STIFF HEART\" ON BB, NO CARDIOLOGIST, PCP (-)CP OR SOA   • Diabetes mellitus type 2, controlled     BG RUNS IN 'S IN AM   • Hammertoe    • Heel pain    • Hyperlipemia    • Hypertension    • Left foot pain    • Lymphedema     NO PROBLEMS LATELY   • Numbness in feet    • Pulmonary embolism 2013    SADDLE PE, RESOLVED       Past Surgical History:   Procedure Laterality Date   • BACK SURGERY  2021    S1-L1 FUSION, CYST REMOVED (TOTAL OF 3 SURGERY  TO )   • CARPAL TUNNEL RELEASE Right    • COLONOSCOPY     • CYST REMOVAL      BACK   • FOOT SURGERY Right     ACHELLIS TENDON/HAMMER TOE   • KNEE ARTHROSCOPY Right     DIAGNOSTIC   • TEMPORAL ARTERY BIOPSY Right 2022    Procedure: Right temporal artery biopsy;  Surgeon: Wilbert Jacob MD;  Location: [facility] MAIN OR;  Service: Vascular;  Laterality: Right;       Family History: family history " includes Heart disease in his brother; Stroke in his father. Otherwise pertinent FHx was reviewed and not pertinent to current issue.    Social History:  reports that he quit smoking about 25 years ago. His smoking use included cigarettes. He has never used smokeless tobacco. He reports that he does not currently use alcohol. He reports that he does not use drugs.    Home Medications:  DULoxetine, Diclofenac Sodium, Semaglutide (2 MG/DOSE), albuterol sulfate HFA, allopurinol, amLODIPine, atorvastatin, empagliflozin, lisinopril, metFORMIN, metoprolol succinate XL, pregabalin, and tamsulosin    Allergies:  Allergies   Allergen Reactions   • Aspirin Anaphylaxis       Objective    Objective     Vitals:   Temp:  [98.8 °F (37.1 °C)] 98.8 °F (37.1 °C)  Heart Rate:  [88] 88  Resp:  [16] 16  BP: (148)/(80) 148/80    Physical Exam:   Constitutional: Awake, alert   Respiratory: Clear, nonlabored respirations    Cardiovascular: Regular rate, no chest retractions   gastrointestinal: Appears soft, nontender     Results:    Assessment & Plan   Assessment / Plan     Brief Patient Summary:  Jonas Mercado is a 72 y.o. male      Active Hospital Problems:  Active Hospital Problems    Diagnosis    • **Hydrocele        Plan:   Proceed to the OR for planned procedure, Procedure(s):  HYDROCELECTOMY, left,    Risk, benefits, and alternatives discussed with patient at length he is agreeable to proceed  Laterality identified, left  All questions addressed      Electronically signed by Nicky Robertson MD, 05/12/23, 7:12 AM EDT.

## 2023-05-12 NOTE — ANESTHESIA POSTPROCEDURE EVALUATION
Patient: Jonas Mercado    Procedure Summary     Date: 05/12/23 Room / Location: [facility] OR 08 / [facility] MAIN OR    Anesthesia Start: 0726 Anesthesia Stop: 0851    Procedure: HYDROCELECTOMY, left (Left: Scrotum) Diagnosis:       Hydrocele, unspecified hydrocele type      (Hydrocele, unspecified hydrocele type [N43.3])    Surgeons: Nicky Robertson MD Provider: Alex Leslie CRNA    Anesthesia Type: general ASA Status: 3          Anesthesia Type: general    Vitals  Vitals Value Taken Time   /83 05/12/23 0945   Temp 36.2 °C (97.2 °F) 05/12/23 0945   Pulse 88 05/12/23 0945   Resp 16 05/12/23 0945   SpO2 92 % 05/12/23 0945           Post Anesthesia Care and Evaluation    Patient location during evaluation: PACU  Patient participation: complete - patient participated  Level of consciousness: awake and alert  Pain management: adequate    Airway patency: patent  Anesthetic complications: No anesthetic complications  PONV Status: none  Cardiovascular status: acceptable  Respiratory status: acceptable  Hydration status: acceptable    Comments: An Anesthesiologist personally participated in the most demanding procedures (including induction and emergence if applicable) of the anesthesia plan, and monitored the course of anesthesia administration at frequent intervals and remained physically present and available for immediate diagnosis and treatment of emergencies.

## 2023-05-13 LAB — QT INTERVAL: 423 MS

## 2023-05-15 ENCOUNTER — TELEPHONE (OUTPATIENT)
Dept: UROLOGY | Facility: CLINIC | Age: 73
End: 2023-05-15
Payer: MEDICARE

## 2023-05-15 NOTE — TELEPHONE ENCOUNTER
Caller: Jonas Mercado    Relationship to patient: Self    Best call back number: [phone]    Chief complaint: PT JUST RECENTLY HAD SURGERY WITH DR. GOODSON AND WAS TOLD HE NEEDED TO BE SEEN ON THURSDAY    Type of visit: POST-OP    Requested date: 5/18/23    If rescheduling, when is the original appointment: 5/23/23 10:30A    Additional notes: WOULD LIKE A CALL BACK, OK TO LEAVE VM

## 2023-05-17 ENCOUNTER — OFFICE VISIT (OUTPATIENT)
Dept: UROLOGY | Facility: CLINIC | Age: 73
End: 2023-05-17
Payer: MEDICARE

## 2023-05-17 VITALS — BODY MASS INDEX: 40.4 KG/M2 | RESPIRATION RATE: 16 BRPM | WEIGHT: 288.6 LBS | HEIGHT: 71 IN

## 2023-05-17 DIAGNOSIS — N43.3 HYDROCELE, UNSPECIFIED HYDROCELE TYPE: Primary | ICD-10-CM

## 2023-05-17 RX ORDER — AMOXICILLIN AND CLAVULANATE POTASSIUM 875; 125 MG/1; MG/1
1 TABLET, FILM COATED ORAL 2 TIMES DAILY
Qty: 10 TABLET | Refills: 0 | Status: SHIPPED | OUTPATIENT
Start: 2023-05-17

## 2023-05-17 RX ORDER — BACITRACIN ZINC AND POLYMYXIN B SULFATE 500; 1000 [USP'U]/G; [USP'U]/G
OINTMENT TOPICAL
Qty: 30 G | Refills: 0 | Status: SHIPPED | OUTPATIENT
Start: 2023-05-17

## 2023-05-26 ENCOUNTER — OFFICE VISIT (OUTPATIENT)
Dept: ORTHOPEDIC SURGERY | Facility: CLINIC | Age: 73
End: 2023-05-26
Payer: MEDICARE

## 2023-05-26 VITALS — HEART RATE: 68 BPM | BODY MASS INDEX: 40.32 KG/M2 | OXYGEN SATURATION: 97 % | WEIGHT: 288 LBS | HEIGHT: 71 IN

## 2023-05-26 DIAGNOSIS — M25.561 RIGHT KNEE PAIN, UNSPECIFIED CHRONICITY: Primary | ICD-10-CM

## 2023-05-26 DIAGNOSIS — M17.11 PRIMARY OSTEOARTHRITIS OF RIGHT KNEE: ICD-10-CM

## 2023-05-26 NOTE — PROGRESS NOTES
"Chief Complaint  No chief complaint on file.    Subjective          Jonas Mercado presents to [facility]   History of Present Illness    He reports the pain of his knee began in  when he had a fall on the porch, patella dislocation. He underwent a arthroscopy sometime later. He reports the knee pain is constant and fells the knee is unstable. He had an injection about 2 weeks ago with some relief from Dr. Adames. He reports the injection takes the edge off. He reports rounds of therapy but the pain returns after some time. Patient mentions back pain.    Allergies   Allergen Reactions   • Aspirin Anaphylaxis        Social History     Socioeconomic History   • Marital status:    Tobacco Use   • Smoking status: Former     Types: Cigarettes     Quit date: 1998     Years since quittin.3   • Smokeless tobacco: Never   Vaping Use   • Vaping Use: Never used   Substance and Sexual Activity   • Alcohol use: Not Currently     Comment: VERY RARE   • Drug use: Never   • Sexual activity: Defer        I reviewed the patient's chief complaint, history of present illness, review of systems, past medical history, surgical history, family history, social history, medications, and allergy list.     REVIEW OF SYSTEMS    Constitutional: Denies fevers, chills, weight loss  Cardiovascular: Denies chest pain, shortness of breath  Skin: Denies rashes, acute skin changes  Neurologic: Denies headache, loss of consciousness  MSK: right knee      Objective   Vital Signs:   Pulse 68   Ht 180.3 cm (71\")   Wt 131 kg (288 lb)   SpO2 97%   BMI 40.17 kg/m²     Body mass index is 40.17 kg/m².    Physical Exam    General: Alert. No acute distress.   Right knee: Skin intact, mild swelling, tenderness to palpation, sensation grossly intact, neuro intact, no skin discoloration, ambulates with mild antalgic gait, stable to varus and valgus stress ,Stable to anterior and posterior drawer, negative " Lachman's, Negative Luisa's, lacks 10 of extension, flexion 115 degrees, varus alignment, positive crepitus, extensor mechanism intact, tender over medial knee    Procedures    Imaging Results (Most Recent)     Procedure Component Value Units Date/Time    XR Knee 4+ View Right [[id number]] Resulted: 05/26/23 1127     Updated: 05/26/23 1128    Narrative:      Indications: Right knee pain    Views: Weightbearing AP, PA flexion, lateral, sunrise right knee    Findings: Advanced degenerative changes.  Bone-on-bone loss of articular   height medially and in the patellofemoral compartment.  No fractures.    Varus alignment.    Comparative Data: No comparative data available                     Assessment and Plan        XR Knee 4+ View Right    Result Date: 5/26/2023  Narrative: Indications: Right knee pain Views: Weightbearing AP, PA flexion, lateral, sunrise right knee Findings: Advanced degenerative changes.  Bone-on-bone loss of articular height medially and in the patellofemoral compartment.  No fractures.  Varus alignment. Comparative Data: No comparative data available        Diagnoses and all orders for this visit:    1. Right knee pain, unspecified chronicity (Primary)  -     XR Knee 4+ View Right    2. Primary osteoarthritis of right knee        Discussed options with the patient. We briefly discussed surgery. He is interested in gel injection at this time. He will proceed with pre-approval of viscosupplementation injection.  He will continue oral medications, home exercises, and activity modification with his assistive device.        Call or return if worsening symptoms.    Scribed for Bennett Carson MD by Kathia Mantilla  05/26/2023   11:00 EDT         Follow Up     Once injection is approved      Patient was given instructions and counseling regarding his condition or for health maintenance advice. Please see specific information pulled into the AVS if appropriate.       I have personally performed the services  described in this document as scribed by the above individual and it is both accurate and complete.     Bennett Carson MD  05/26/23  11:29 EDT

## 2023-06-01 ENCOUNTER — OFFICE VISIT (OUTPATIENT)
Dept: UROLOGY | Facility: CLINIC | Age: 73
End: 2023-06-01
Payer: MEDICARE

## 2023-06-01 VITALS — WEIGHT: 283.4 LBS | RESPIRATION RATE: 16 BRPM | HEIGHT: 71 IN | BODY MASS INDEX: 39.68 KG/M2

## 2023-06-01 DIAGNOSIS — N43.3 HYDROCELE, UNSPECIFIED HYDROCELE TYPE: Primary | ICD-10-CM

## 2023-06-01 NOTE — PROGRESS NOTES
UROLOGY OFFICE follow-up NOTE    Subjective   HPI  Jonas Mecrado is a 72 y.o. male. Patient presents for postop follow-up after left hydrocelectomy, 5/12/2023.  Drain removed at last visit.    The patient reports that he is doing better.   He states that the drainage has somewhat stopped. He denies any concern for infection.     The patient reports that the swelling has gone down. He states that he is not having any pain unless he sits wrong or on something hard. The patient reports that the pain that he had before the surgery has gotten better.       Review of systems  A review of systems was performed, and positive findings are noted in the HPI.    Objective     Vital Signs:   There were no vitals taken for this visit.      Physical exam  No acute distress, well-nourished  Awake alert and oriented  Mood normal; affect normal  : Slight dehiscence of incision at superior portion, approximately 0.5 cm; no expressible drainage or evidence of infection, granulation tissue at base; drain site healed well; slight persistent scrotal edema with induration of the underlying tissues as expected, healing well      Problem List:  Patient Active Problem List   Diagnosis    Lymphedema    Hyperlipemia    Hammertoe    Hypertension    Spondylolisthesis at L5-S1 level    Pulmonary embolism    High risk medication use    Type 2 diabetes mellitus with hyperglycemia, without long-term current use of insulin    Gout    Temporal arteritis    Bilateral primary osteoarthritis of knee    BPH (benign prostatic hyperplasia)    Headache    Localized edema    Lumbar adjacent segment disease with spondylolisthesis    Spinal stenosis    Nontraumatic complete tear of right rotator cuff    Hydrocele           Assessment & Plan   Diagnoses and all orders for this visit:    1. Hydrocele, unspecified hydrocele type (Primary)      continues to do well: Slight incisional dehiscence at the superior portion; good granulation tissue at the base,  no evidence of infection.  Discussed that this will heal with time but will start tiny W2D dressings daily in an attempt to speed healing process; supplies and instructions provided    F/u 1 week   all questions and concerns have been addressed at this time.          Transcribed from ambient dictation for Nicky Robertson MD by Nick Jose.  06/01/23   15:00 EDT    Patient or patient representative verbalized consent to the visit recording.  I have personally performed the services described in this document as transcribed by the above individual, and it is both accurate and complete.

## 2023-06-08 ENCOUNTER — OFFICE VISIT (OUTPATIENT)
Dept: UROLOGY | Facility: CLINIC | Age: 73
End: 2023-06-08
Payer: MEDICARE

## 2023-06-08 VITALS — HEIGHT: 71 IN | WEIGHT: 279.4 LBS | RESPIRATION RATE: 16 BRPM | BODY MASS INDEX: 39.11 KG/M2

## 2023-06-08 DIAGNOSIS — N43.3 HYDROCELE, UNSPECIFIED HYDROCELE TYPE: Primary | ICD-10-CM

## 2023-06-08 NOTE — PROGRESS NOTES
UROLOGY OFFICE follow-up NOTE    Subjective   HPI  Jonas Mercado is a 72 y.o. male.  Patient presents for postop follow-up after left hydrocelectomy, 5/12/2023.  Drain removed at last visit.     The patient reports that he is doing better.   He states that the drainage has somewhat stopped. He denies any concern for infection.      The patient reports that the swelling has gone down. He states that he is not having any pain unless he sits wrong or on something hard. The patient reports that the pain that he had before the surgery has gotten better.      Update 6/9/2023: Presents for follow-up hydrocelectomy, noted to have a small incisional dehiscence at last visit.  Has been performing local wound care.  Patient states he is doing well; wife has been forming dressing changes; states it is healing nicely.  No pain or concerns.      Review of systems  A review of systems was performed, and positive findings are noted in the HPI.    Objective     Vital Signs:   There were no vitals taken for this visit.      Physical exam  No acute distress, well-nourished  Awake alert and oriented  Mood normal; affect normal  : Slight dehiscence of incision at superior portion, approximately 0.5 cm; with epithelialization at the edges, good granulation tissue at the base; no expressible drainage or tenderness; resolution of scrotal edema; well-healed after hydrocelectomy    Problem List:  Patient Active Problem List   Diagnosis    Lymphedema    Hyperlipemia    Hammertoe    Hypertension    Spondylolisthesis at L5-S1 level    Pulmonary embolism    High risk medication use    Type 2 diabetes mellitus with hyperglycemia, without long-term current use of insulin    Gout    Temporal arteritis    Bilateral primary osteoarthritis of knee    BPH (benign prostatic hyperplasia)    Headache    Localized edema    Lumbar adjacent segment disease with spondylolisthesis    Spinal stenosis    Nontraumatic complete tear of right rotator  cuff    Hydrocele       Assessment & Plan   Diagnoses and all orders for this visit:    1. Hydrocele, unspecified hydrocele type (Primary)      Patient doing very well; continues to heal  Anticipate epithelialization of the granulation tissue with time; discussed with them at length.  At this point, they are okay to follow-up with us as needed but encouraged to call with any concerns or questions.      All questions addressed

## 2023-07-20 ENCOUNTER — APPOINTMENT (OUTPATIENT)
Dept: GENERAL RADIOLOGY | Facility: HOSPITAL | Age: 73
End: 2023-07-20
Payer: MEDICARE

## 2023-07-20 ENCOUNTER — APPOINTMENT (OUTPATIENT)
Dept: CT IMAGING | Facility: HOSPITAL | Age: 73
End: 2023-07-20
Payer: MEDICARE

## 2023-07-20 ENCOUNTER — HOSPITAL ENCOUNTER (EMERGENCY)
Facility: HOSPITAL | Age: 73
Discharge: HOME | End: 2023-07-20
Attending: EMERGENCY MEDICINE | Admitting: EMERGENCY MEDICINE
Payer: MEDICARE

## 2023-07-20 VITALS
HEIGHT: 71 IN | DIASTOLIC BLOOD PRESSURE: 72 MMHG | BODY MASS INDEX: 38.61 KG/M2 | RESPIRATION RATE: 20 BRPM | TEMPERATURE: 98.6 F | WEIGHT: 275.8 LBS | SYSTOLIC BLOOD PRESSURE: 143 MMHG | OXYGEN SATURATION: 94 % | HEART RATE: 77 BPM

## 2023-07-20 DIAGNOSIS — S06.0X0A CONCUSSION WITHOUT LOSS OF CONSCIOUSNESS, INITIAL ENCOUNTER: ICD-10-CM

## 2023-07-20 DIAGNOSIS — T07.XXXA MULTIPLE ABRASIONS: ICD-10-CM

## 2023-07-20 DIAGNOSIS — S09.90XA TRAUMATIC INJURY OF HEAD, INITIAL ENCOUNTER: Primary | ICD-10-CM

## 2023-07-20 DIAGNOSIS — S51.811A SKIN TEAR OF FOREARM WITHOUT COMPLICATION, RIGHT, INITIAL ENCOUNTER: ICD-10-CM

## 2023-07-20 PROCEDURE — 90715 TDAP VACCINE 7 YRS/> IM: CPT | Performed by: EMERGENCY MEDICINE

## 2023-07-20 PROCEDURE — 25010000002 TETANUS-DIPHTH-ACELL PERTUSSIS 5-2.5-18.5 LF-MCG/0.5 SUSPENSION PREFILLED SYRINGE: Performed by: EMERGENCY MEDICINE

## 2023-07-20 PROCEDURE — 73130 X-RAY EXAM OF HAND: CPT

## 2023-07-20 PROCEDURE — 90471 IMMUNIZATION ADMIN: CPT | Performed by: EMERGENCY MEDICINE

## 2023-07-20 PROCEDURE — 99283 EMERGENCY DEPT VISIT LOW MDM: CPT

## 2023-07-20 PROCEDURE — 70450 CT HEAD/BRAIN W/O DYE: CPT

## 2023-07-20 RX ORDER — HYDROCODONE BITARTRATE AND ACETAMINOPHEN 7.5; 325 MG/1; MG/1
1 TABLET ORAL ONCE
Status: COMPLETED | OUTPATIENT
Start: 2023-07-20 | End: 2023-07-20

## 2023-07-20 RX ORDER — GINSENG 100 MG
1 CAPSULE ORAL ONCE
Status: COMPLETED | OUTPATIENT
Start: 2023-07-20 | End: 2023-07-20

## 2023-07-20 RX ADMIN — TETANUS TOXOID, REDUCED DIPHTHERIA TOXOID AND ACELLULAR PERTUSSIS VACCINE, ADSORBED 0.5 ML: 5; 2.5; 8; 8; 2.5 SUSPENSION INTRAMUSCULAR at 21:58

## 2023-07-20 RX ADMIN — HYDROCODONE BITARTRATE AND ACETAMINOPHEN 1 TABLET: 7.5; 325 TABLET ORAL at 21:58

## 2023-07-20 RX ADMIN — BACITRACIN 0.9 G: 500 OINTMENT TOPICAL at 22:10

## 2023-07-21 NOTE — DISCHARGE INSTRUCTIONS
Keep wounds clean and dry.  Use the antibiotic ointment twice daily change your dressings twice daily and keep covered while healing.  Watch for signs of infection such as increased redness or drainage.  Ice to area several times a day with gentle range of motion stretches to areas for comfort.  You may experience some concussion symptoms over the next several weeks such as lightheadedness, blurry vision, headaches, nausea.  Follow-up with your family doctor next week for reevaluation and to ensure that your concussion symptoms resolve and that your wounds heal properly.  Return to the emergency department for any acutely developing neurological symptoms, any altered mental status, any persistent vomiting, any signs of any infection or any new or worse concerns.

## 2023-07-21 NOTE — ED PROVIDER NOTES
Subjective   History of Present Illness  The patient presents to the emergency department and states that he was at the movie theater and he was walking to the theater on the carpeted floor and states that he has shoe come off causing him to trip and fall.  The patient fell down and struck his forehead.  He states that he had no loss of consciousness.  He does have a skin tear to his mid forehead with bleeding controlled.  He also has a very large skin tear to his right forearm and right hand around the thumb area.  The patient is able to flex and extend the shoulder the elbow and the wrist without any difficulties.  He is able to flex and extend the thumb without any difficulties.  He states that he tried to catch himself with his outstretched hands without success.  He is alert and oriented has a grossly intact neuro exam.  He denies any nausea or vomiting.  He states he has had no vision loss but states he has had some blurry vision but is able to blink a couple times and make it clear.    History provided by:  Patient and spouse   used: No      Review of Systems   Constitutional:  Negative for chills and fever.   HENT:  Negative for congestion, ear pain and sore throat.    Eyes:  Negative for pain.   Respiratory:  Negative for cough, chest tightness and shortness of breath.    Cardiovascular:  Negative for chest pain.   Gastrointestinal:  Negative for abdominal pain, diarrhea, nausea and vomiting.   Genitourinary:  Negative for flank pain and hematuria.   Musculoskeletal:  Negative for back pain, joint swelling, neck pain and neck stiffness.   Skin:  Positive for wound. Negative for pallor.   Neurological:  Negative for dizziness, seizures, facial asymmetry, speech difficulty and headaches.   All other systems reviewed and are negative.    Past Medical History:   Diagnosis Date    Arthritis     Back pain     Bilateral ankle pain     NEUROPATHY    Corns and callus     Coronary artery disease   "   \"STIFF HEART\" ON BB, NO CARDIOLOGIST, PCP (-)CP OR SOA    Diabetes mellitus type 2, controlled     BG RUNS IN 'S IN AM    Hammertoe     Heel pain     Hyperlipemia     Hypertension     Left foot pain     Lymphedema     NO PROBLEMS LATELY    Numbness in feet     Pulmonary embolism 2013    SADDLE PE, RESOLVED       Allergies   Allergen Reactions    Aspirin Anaphylaxis       Past Surgical History:   Procedure Laterality Date    BACK SURGERY  2021    S1-L1 FUSION, CYST REMOVED (TOTAL OF 3 SURGERY  TO )    CARPAL TUNNEL RELEASE Right     COLONOSCOPY      CYST REMOVAL      BACK    FOOT SURGERY Right     ACHELLIS TENDON/HAMMER TOE    HYDROCELECTOMY Left 2023    Procedure: HYDROCELECTOMY, left;  Surgeon: Nicky Robertson MD;  Location: [facility] MAIN OR;  Service: Urology;  Laterality: Left;    KNEE ARTHROSCOPY Right     DIAGNOSTIC    TEMPORAL ARTERY BIOPSY Right 2022    Procedure: Right temporal artery biopsy;  Surgeon: Wilbert Jacob MD;  Location: [facility] MAIN OR;  Service: Vascular;  Laterality: Right;       Family History   Problem Relation Age of Onset    Stroke Father     Heart disease Brother     Malig Hyperthermia Neg Hx        Social History     Socioeconomic History    Marital status:    Tobacco Use    Smoking status: Former     Types: Cigarettes     Quit date: 1998     Years since quittin.4    Smokeless tobacco: Never   Vaping Use    Vaping Use: Never used   Substance and Sexual Activity    Alcohol use: Not Currently     Comment: VERY RARE    Drug use: Never    Sexual activity: Defer           Objective   Physical Exam  Vitals and nursing note reviewed.   Constitutional:       General: He is not in acute distress.     Appearance: Normal appearance. He is not ill-appearing or toxic-appearing.   HENT:      Head: Normocephalic.      Right Ear: Tympanic membrane, ear canal and external ear normal.      Left Ear: Tympanic membrane, ear canal and external ear " normal.      Mouth/Throat:      Mouth: Mucous membranes are moist.      Pharynx: Oropharynx is clear.   Eyes:      General: No scleral icterus.     Conjunctiva/sclera: Conjunctivae normal.      Pupils: Pupils are equal, round, and reactive to light.   Cardiovascular:      Rate and Rhythm: Normal rate and regular rhythm.      Pulses: Normal pulses.   Pulmonary:      Effort: Pulmonary effort is normal. No respiratory distress.      Breath sounds: Normal breath sounds. No wheezing.   Musculoskeletal:         General: Tenderness and signs of injury present. No swelling or deformity. Normal range of motion.      Cervical back: Normal range of motion and neck supple. Tenderness present. No rigidity.   Lymphadenopathy:      Cervical: No cervical adenopathy.   Skin:     General: Skin is warm and dry.      Capillary Refill: Capillary refill takes less than 2 seconds.      Findings: Bruising and lesion present. No erythema or rash.   Neurological:      General: No focal deficit present.      Mental Status: He is alert and oriented to person, place, and time. Mental status is at baseline.   Psychiatric:         Mood and Affect: Mood normal.         Behavior: Behavior normal.       Procedures           ED Course                                           Medical Decision Making  Problems Addressed:  Concussion without loss of consciousness, initial encounter: complicated acute illness or injury  Multiple abrasions: complicated acute illness or injury  Skin tear of forearm without complication, right, initial encounter: complicated acute illness or injury  Traumatic injury of head, initial encounter: complicated acute illness or injury    Amount and/or Complexity of Data Reviewed  Radiology: ordered.    Risk  OTC drugs.  Prescription drug management.        Final diagnoses:   Traumatic injury of head, initial encounter   Multiple abrasions   Skin tear of forearm without complication, right, initial encounter   Concussion without  loss of consciousness, initial encounter       ED Disposition  ED Disposition       ED Disposition   Discharge    Condition   Stable    Comment   --               Tristan Phipps  [address] [facility] [address] [phone]    Call   FOR FOLLOW UP         Medication List        New Prescriptions      mupirocin 2 % ointment  Commonly known as: BACTROBAN  Apply 1 application  topically to the appropriate area as directed 2 (Two) Times a Day for 3 days.               Where to Get Your Medications        These medications were sent to [id number] - [place], [place] - [address] AT [place] [address] [facility] - [phone]  - [phone] FX  [address] , [facility] [address]      Phone: [phone]   mupirocin 2 % ointment            Eliana Smith, YU  07/21/23 3577

## 2023-10-06 ENCOUNTER — OFFICE VISIT (OUTPATIENT)
Dept: ORTHOPEDIC SURGERY | Facility: CLINIC | Age: 73
End: 2023-10-06
Payer: MEDICARE

## 2023-10-06 VITALS
WEIGHT: 272 LBS | SYSTOLIC BLOOD PRESSURE: 139 MMHG | DIASTOLIC BLOOD PRESSURE: 72 MMHG | HEART RATE: 84 BPM | OXYGEN SATURATION: 93 % | HEIGHT: 71 IN | BODY MASS INDEX: 38.08 KG/M2

## 2023-10-06 DIAGNOSIS — R20.0 RIGHT ARM NUMBNESS: Primary | ICD-10-CM

## 2023-10-06 DIAGNOSIS — M17.11 PRIMARY OSTEOARTHRITIS OF RIGHT KNEE: ICD-10-CM

## 2023-10-06 NOTE — PROGRESS NOTES
"Chief Complaint  Follow-up and Pain of the Right Knee    Subjective          Jonas Mercado presents to [facility] for   History of Present Illness    Jonas returns today for follow-up of his right knee.  He has right knee arthritis we are treating nonoperatively.  He reports significant relief from the Synvisc injection at his previous visit.  He has no pain at this time.  He is making progress with physical therapy.  They are working on gait and balance.  He is ambulating better and using a cane.  He does report right arm numbness today.  He feels a radiating sense of pain and numbness extending from the neck down to the fourth and fifth digits.  He is interested in further evaluation of his numbness and pain.    Allergies   Allergen Reactions    Aspirin Anaphylaxis        Social History     Socioeconomic History    Marital status:    Tobacco Use    Smoking status: Former     Types: Cigarettes     Quit date: 1998     Years since quittin.6    Smokeless tobacco: Never   Vaping Use    Vaping Use: Never used   Substance and Sexual Activity    Alcohol use: Not Currently     Comment: VERY RARE    Drug use: Never    Sexual activity: Defer        I reviewed the patient's chief complaint, history of present illness, review of systems, past medical history, surgical history, family history, social history, medications, and allergy list.     REVIEW OF SYSTEMS    Constitutional: Denies fevers, chills, weight loss  Cardiovascular: Denies chest pain, shortness of breath  Skin: Denies rashes, acute skin changes  Neurologic: Denies headache, loss of consciousness  MSK: Right knee pain      Objective   Vital Signs:   /72   Pulse 84   Ht 180.3 cm (71\")   Wt 123 kg (272 lb)   SpO2 93%   BMI 37.94 kg/m²     Body mass index is 37.94 kg/m².    Physical Exam    General: Alert. No acute distress.   Right lower extremity: Skin intact, mild swelling, tenderness to palpation, " sensation grossly intact, neuro intact, no skin discoloration, ambulates with mild antalgic gait, stable to varus and valgus stress ,Stable to anterior and posterior drawer, negative Lachman's, Negative Luisa's, lacks 10 of extension, flexion 115 degrees, varus alignment, positive crepitus, extensor mechanism intact, tender over medial knee .     Right upper extremity: No muscle atrophy noted.  Decreased sensation to light touch over the fourth and fifth digits.  Negative Tinel's at the elbow.  Full elbow range of motion.  Pain with cervical range of motion.  4-5  strength in the ulnar nerve distribution.    Procedures    Imaging Results (Most Recent)       None                     Assessment and Plan        No results found.     Diagnoses and all orders for this visit:    1. Right arm numbness (Primary)  -     EMG & Nerve Conduction Test; Future    2. Primary osteoarthritis of right knee        We will not add any additional treatments for the right knee at this time.  He will continue physical therapy and we may consider a repeat steroid injection to the right knee in the future.  We will evaluate his right arm numbness with an EMG.  He will follow-up in 2 months for further evaluation.    Call or return if worsening symptoms.    Scribed for Bennett Carson MD by Marilu Valle  10/06/2023   11:43 EDT         Follow Up       2 months with the EMG results    Patient was given instructions and counseling regarding his condition or for health maintenance advice. Please see specific information pulled into the AVS if appropriate.       I have personally performed the services described in this document as scribed by the above individual and it is both accurate and complete.     Bennett Carson MD  10/06/23  13:02 EDT

## 2023-10-12 ENCOUNTER — TELEPHONE (OUTPATIENT)
Dept: NEUROLOGY | Facility: CLINIC | Age: 73
End: 2023-10-12

## 2023-10-12 NOTE — TELEPHONE ENCOUNTER
PATIENT IS CURRENTLY SCHEDULED FOR EMG NVC ON  1/9/24. HE IS WAIT LISTED.    PATIENT WILL BE LEAVING THE STATE ON DECEMBER 24 AND WILL NOT RETURN UNTIL APRIL, 2024.    PATIENT IS REQUESTING SOONER APPT - PATIENT HAS A 30 MINUTE DRIVE TIME TO REACH [facility].    PLEASE ADVISE PATIENT    THANK YOU

## 2023-11-02 ENCOUNTER — OFFICE VISIT (OUTPATIENT)
Dept: PODIATRY | Facility: CLINIC | Age: 73
End: 2023-11-02
Payer: MEDICARE

## 2023-11-02 VITALS
HEIGHT: 71 IN | SYSTOLIC BLOOD PRESSURE: 135 MMHG | WEIGHT: 270.4 LBS | OXYGEN SATURATION: 96 % | HEART RATE: 74 BPM | DIASTOLIC BLOOD PRESSURE: 74 MMHG | BODY MASS INDEX: 37.85 KG/M2

## 2023-11-02 DIAGNOSIS — E11.8 DIABETIC FOOT: ICD-10-CM

## 2023-11-02 DIAGNOSIS — M79.672 FOOT PAIN, BILATERAL: Primary | ICD-10-CM

## 2023-11-02 DIAGNOSIS — B35.1 ONYCHOMYCOSIS: ICD-10-CM

## 2023-11-02 DIAGNOSIS — M79.671 FOOT PAIN, BILATERAL: Primary | ICD-10-CM

## 2023-11-02 DIAGNOSIS — L60.0 ONYCHOCRYPTOSIS: ICD-10-CM

## 2023-11-02 DIAGNOSIS — E11.9 NON-INSULIN DEPENDENT TYPE 2 DIABETES MELLITUS: ICD-10-CM

## 2023-11-02 DIAGNOSIS — M72.2 PLANTAR FASCIITIS: ICD-10-CM

## 2023-11-02 DIAGNOSIS — G62.9 NEUROPATHY: ICD-10-CM

## 2023-11-02 RX ORDER — TRIAMCINOLONE ACETONIDE 40 MG/ML
20 INJECTION, SUSPENSION INTRA-ARTICULAR; INTRAMUSCULAR ONCE
Status: COMPLETED | OUTPATIENT
Start: 2023-11-02 | End: 2023-11-02

## 2023-11-02 RX ORDER — BUPIVACAINE HYDROCHLORIDE 2.5 MG/ML
0.5 INJECTION, SOLUTION INFILTRATION; PERINEURAL ONCE
Status: COMPLETED | OUTPATIENT
Start: 2023-11-02 | End: 2023-11-02

## 2023-11-02 RX ORDER — TESTOSTERONE CYPIONATE 200 MG/ML
4 INJECTION INTRAMUSCULAR ONCE
Status: COMPLETED | OUTPATIENT
Start: 2023-11-02 | End: 2023-11-02

## 2023-11-02 RX ORDER — EMPAGLIFLOZIN 10 MG/1
10 TABLET, FILM COATED ORAL DAILY
COMMUNITY
Start: 2023-10-21

## 2023-11-02 RX ADMIN — BUPIVACAINE HYDROCHLORIDE 0.5 ML: 2.5 INJECTION, SOLUTION INFILTRATION; PERINEURAL at 13:20

## 2023-11-02 RX ADMIN — TESTOSTERONE CYPIONATE 4 MG: 200 INJECTION INTRAMUSCULAR at 13:28

## 2023-11-02 RX ADMIN — TRIAMCINOLONE ACETONIDE 20 MG: 40 INJECTION, SUSPENSION INTRA-ARTICULAR; INTRAMUSCULAR at 13:25

## 2023-11-02 NOTE — PROGRESS NOTES
"    [facility]    Today's Date: 11/02/23    Patient Name: Jonas Mercado  MRN: [id number]  CSN: [id number]  PCP: Tristan Phipps, Last PCP Visit: 9 August 2023.  Referring Provider: No ref. provider found    SUBJECTIVE     Chief Complaint   Patient presents with    Left Foot - Follow-up, Pain, Injections     RFC, pt states he would also like to get an injection in left foot.     Right Foot - Follow-up     RFC    Follow-up     Diabetic, last B/S, yday 128.   LOV with PCP 7/24/2023 (Dr. EVAN Phipps)   last A1c 4/30/2023 8.20     HPI: Jonas Mercado, a 72 y.o.male, comes to clinic.    New, Established, New Problem:  established     Location:  Toenails    Duration:   Greater than five years    Onset:  Gradual    Nature:  sore with palpation.    Stable, worsening, improving:   Stable    Aggravating factors:  Pain with shoe gear and ambulation.    Previous Treatment:  debridement    Patient relates recurrence of left heel pain request cortisone injection today.  __________________________________    NIDDM    Patient reported last blood glucose:  128.  __________________________________    Patient reports the following medical changes since their last visit: Going to physical therapy for low back issues.    Patient denies any fevers, chills, nausea, vomiting, shortness of breathe, nor any other constitutional signs nor symptoms.       Past Medical History:   Diagnosis Date    Arthritis     Back pain     Bilateral ankle pain     NEUROPATHY    Corns and callus     Coronary artery disease     \"STIFF HEART\" ON BB, NO CARDIOLOGIST, PCP (-)CP OR SOA    Diabetes mellitus type 2, controlled     BG RUNS IN 'S IN AM    Hammertoe     Heel pain     Hyperlipemia     Hypertension     Left foot pain     Lymphedema     NO PROBLEMS LATELY    Numbness in feet     Pulmonary embolism 2013    SADDLE PE, RESOLVED     Past Surgical History:   Procedure Laterality Date    BACK SURGERY  11/2021    S1-L1 FUSION, CYST " REMOVED (TOTAL OF 3 SURGERY  TO )    CARPAL TUNNEL RELEASE Right     COLONOSCOPY      CYST REMOVAL      BACK    FOOT SURGERY Right     ACHELLIS TENDON/HAMMER TOE    HYDROCELECTOMY Left 2023    Procedure: HYDROCELECTOMY, left;  Surgeon: Nicky Robertson MD;  Location: [facility] MAIN OR;  Service: Urology;  Laterality: Left;    KNEE ARTHROSCOPY Right     DIAGNOSTIC    TEMPORAL ARTERY BIOPSY Right 2022    Procedure: Right temporal artery biopsy;  Surgeon: Wilbert Jacob MD;  Location: [facility] MAIN OR;  Service: Vascular;  Laterality: Right;     Family History   Problem Relation Age of Onset    Stroke Father     Heart disease Brother     Malamie Hyperthermia Neg Hx      Social History     Socioeconomic History    Marital status:    Tobacco Use    Smoking status: Former     Packs/day: 0.50     Years: 4.00     Additional pack years: 0.00     Total pack years: 2.00     Types: Cigarettes     Start date:      Quit date: 1998     Years since quittin.7    Smokeless tobacco: Never   Vaping Use    Vaping Use: Never used   Substance and Sexual Activity    Alcohol use: Not Currently     Comment: VERY RARE    Drug use: Never    Sexual activity: Defer     Allergies   Allergen Reactions    Aspirin Anaphylaxis     Current Outpatient Medications   Medication Sig Dispense Refill    albuterol sulfate  (90 Base) MCG/ACT inhaler 1 puff Every 6 (Six) Hours As Needed.      allopurinol (ZYLOPRIM) 300 MG tablet Take 1 tablet by mouth Daily. 90 tablet 0    amLODIPine (NORVASC) 5 MG tablet TAKE 1 TABLET DAILY 90 tablet 3    atorvastatin (LIPITOR) 20 MG tablet Take 1 tablet by mouth Daily. 90 tablet 0    Diclofenac Sodium (VOLTAREN) 1 % gel gel Apply 4 g topically to the appropriate area as directed 4 (Four) Times a Day As Needed.      DULoxetine (CYMBALTA) 60 MG capsule Take 1 capsule by mouth Daily.      Jardiance 10 MG tablet tablet Take 1 tablet by mouth Daily.      lisinopril  (PRINIVIL,ZESTRIL) 40 MG tablet Take 1 tablet by mouth Daily. 90 tablet 0    metFORMIN (GLUCOPHAGE) 500 MG tablet Take 2 tablets by mouth 2 (Two) Times a Day With Meals. INSTRUCTED PER ANESTHESIA STANDING ORDERS      metoprolol succinate XL (TOPROL-XL) 200 MG 24 hr tablet Take 1 tablet by mouth Daily. 90 tablet 0    pregabalin (LYRICA) 150 MG capsule Take 1 capsule by mouth 2 (Two) Times a Day. 180 capsule 0    Semaglutide, 2 MG/DOSE, (Ozempic, 2 MG/DOSE,) 8 MG/3ML solution pen-injector Inject 2 mg under the skin into the appropriate area as directed. TAKES FOR DM- LAST DOSE TAKEN 5-11-23  INSTRUCTED PER ANESTHESIA STANDING ORDERS      tamsulosin (FLOMAX) 0.4 MG capsule 24 hr capsule Take 1 capsule by mouth Daily.       Current Facility-Administered Medications   Medication Dose Route Frequency Provider Last Rate Last Admin    bupivacaine (MARCAINE) 0.25 % injection 0.5 mL  0.5 mL Injection Once Toby Gonzalez DPM        dexAMETHasone sodium phosphate injection 4 mg  4 mg Intra-articular Once Toby Gonzalez DPM        triamcinolone acetonide (KENALOG-40) injection 20 mg  20 mg Intramuscular Once Toby Gonzalez DPM         Review of Systems   Constitutional: Negative.    Musculoskeletal:         Left heel pain   Skin:         Toenails 1 through 5 bilaterally   Neurological:  Positive for numbness.   All other systems reviewed and are negative.      OBJECTIVE     Vitals:    11/02/23 1305   BP: 135/74   Pulse: 74   SpO2: 96%         Lab Results   Component Value Date    HGBA1C 8.20 (H) 04/03/2023       Lab Results   Component Value Date    GLUCOSE 266 (H) 01/19/2022    CALCIUM 8.9 01/19/2022     01/19/2022    K 4.0 01/19/2022    CO2 25.4 01/19/2022    CL 98 01/19/2022    BUN 22 01/19/2022    CREATININE 1.05 01/19/2022    EGFRIFNONA 70 01/19/2022    BCR 21.0 01/19/2022    ANIONGAP 12.6 01/19/2022       Patient seen in no apparent distress.      PHYSICAL EXAM:     Foot/Ankle  Exam    GENERAL  Diabetic foot exam performed    Appearance:  obese and elderly  Orientation:  AAOx3  Affect:  appropriate  Gait:  unimpaired  Assistance:  independent  Right shoe gear: casual shoe  Left shoe gear: casual shoe    VASCULAR     Right Foot Vascularity   Dorsalis pedis:  1+  Posterior tibial:  1+  Skin temperature:  cool  Edema grading:  Pitting and 2+  CFT:  3  Pedal hair growth:  Absent  Varicosities:  mild varicosities     Left Foot Vascularity   Dorsalis pedis:  1+  Posterior tibial:  1+  Skin temperature:  cool  Edema gradin+ and pitting  CFT:  3  Pedal hair growth:  Absent  Varicosities:  mild varicosities     NEUROLOGIC     Right Foot Neurologic   Light touch sensation: diminished  Vibratory sensation: diminished  Hot/Cold sensation: diminished  Protective Sensation using [place] Monofilament:   Sites intact: 2  Sites tested: 10     Left Foot Neurologic   Light touch sensation: diminished  Vibratory sensation: diminished  Hot/Cold sensation:  diminished  Protective Sensation using [place] Monofilament:   Sites intact: 2  Sites tested: 10    MUSCULOSKELETAL     Left Foot Musculoskeletal   Tenderness:  plantar fascia tenderness    MUSCLE STRENGTH     Right Foot Muscle Strength   Foot dorsiflexion:  4  Foot plantar flexion:  4  Foot inversion:  4  Foot eversion:  4     Left Foot Muscle Strength   Foot dorsiflexion:  4  Foot plantar flexion:  4  Foot inversion:  4  Foot eversion:  4    DERMATOLOGIC      Right Foot Dermatologic   Skin  Right foot skin is intact.   Nails  1.  Positive for elongated, onychomycosis, abnormal thickness, subungual debris and ingrown toenail.  2.  Positive for elongated, onychomycosis, abnormal thickness, subungual debris and ingrown toenail.  3.  Positive for elongated, onychomycosis, abnormal thickness, subungual debris and ingrown toenail.  4.  Positive for elongated, onychomycosis, abnormal thickness, subungual debris and ingrown toenail.  5.   Positive for elongated, onychomycosis, abnormal thickness, subungual debris and ingrown toenail.     Left Foot Dermatologic   Skin  Left foot skin is intact.   Nails  1.  Positive for elongated, onychomycosis, abnormal thickness, subungual debris and ingrown toenail.  2.  Positive for elongated, onychomycosis, abnormal thickness, subungual debris and ingrown toenail.  3.  Positive for elongated, onychomycosis, abnormal thickness, subungual debris and ingrown toenail.  4.  Positive for elongated, onychomycosis, abnormally thick, subungual debris and ingrown toenail.  5.  Positive for elongated, onychomycosis, abnormally thick, subungual debris and ingrown toenail.    Diabetic Foot Exam Performed and Monofilament Test Performed      ASSESSMENT/PLAN     Diagnoses and all orders for this visit:    1. Foot pain, bilateral (Primary)    2. Non-insulin dependent type 2 diabetes mellitus    3. Onychomycosis    4. Neuropathy    5. Diabetic foot    6. Onychocryptosis    7. Plantar fasciitis  -     bupivacaine (MARCAINE) 0.25 % injection 0.5 mL  -     dexAMETHasone sodium phosphate injection 4 mg  -     triamcinolone acetonide (KENALOG-40) injection 20 mg    Comprehensive lower extremity examination and evaluation was performed.    Discussed findings and treatment plan including risks, benefits, and treatment options with patient in detail. Patient agreed with treatment plan.    Toenails 1 through 5 bilaterally were debrided in thickness and length and then smoothed with a Dremel Tool.  Tolerated the procedure well without complications.    Plantar Fasciitis Injection  Date/Time: 11/02/2023  Performed by: Toby Gonzalez DPM  Authorized by: Toby Gonzalez DPM     Consent: Verbal consent obtained. Written consent obtained.  Risks and benefits: risks, benefits and alternatives were discussed  Consent given by: patient  Patient identity confirmed: verbally with patient  Indications: pain relief    Injection site: Left  heel.    Sedation:  Patient sedated: no    Patient position: sitting  Needle size: 27 G  Local anesthetic: 0.5 ml 0.25% Marcaine plain, 0.5 ml Kenalog 40 mg/ml, and 0.5 ml Decadron 4 mg/mL.   Outcome: pain improved  Patient tolerance: Patient tolerated the procedure well with no immediate complications    Patient may begin to weight bear as tolerated in supportive shoes.  No impact activities for two weeks.  After that time, the patient may increase activities as tolerated. Patient states understanding and agreement with this plan.    Rice Therapy: It is important to treat any injury as soon as possible to help control swelling and increase recovery time. The recognized regimen for immediate treatment of sport injuries includes rest, ice (cold application), compression, and elevation (RICE). Remove the injured athlete from play, apply ice to the affected area, wrap or compress the injured area with an elastic bandage when appropriate, and elevate the injured area above heart level to reduce swelling.  The patient is to not use ice for longer than 20 minutes at a time, with at least 20 minutes of no ice usage between applications.  The patient states understanding and agreement with this plan.    Patient instructed use OTC analgesics with dosing per package insert as needed.  Patient states understanding and agreement with this plan.    Diabetic foot exam performed and documented this date, compliant with CQM required standards. Detail of findings as noted in physical exam.  Lower extremity Neurologic exam for diabetic patient performed and documented this date, compliant with PQRS required standards. Detail of findings as noted in physical exam.  Advised patient importance of good routine lower extremity hygiene. Advised patient importance of evaluating for intact skin and pain free nail borders.  Advised patient to use mirror to evaluate plantar/ soles of feet for better visualization. Advised patient monitor and  phone office to be seen if any cracking to skin, open lesions, painful nail borders or if nails become elongated prior to next visit. Advised patient importance of daily cleansing of lower extremities, followed by good skin cream to maintain normal hydration of skin. Also advised patient importance of close daily monitoring of blood sugar. Advised to regulate diet and medications to maintain control of blood sugar in optimal range. Contact primary care provider if difficulties maintaining blood sugar levels.  Advised Patient of presence of Diabetes Mellitus condition.  Advised Patient risk of progression and worsening or improvement, then return of condition.  Will monitor condition for any change in future. Treat with most appropriate treatment pending status of condition.  Counseled and advised patient extensively on nature and ramifications of diabetes. Standard instructions given to patient for good diabetic foot care and maintenance. Advised importance of careful monitoring to avoid break down and complications secondary to diabetes. Advised patient importance of strict maintenance of blood sugar control. Advised patient of possible ominous results from neglect of condition, i.e.: amputation/ loss of digits, feet and legs, or even death.  Patient states understands counseling, will monitor closely, continue good hygiene and routine diabetic foot care. Patient will contact office is questions or problems.      An After Visit Summary was printed and given to the patient at discharge, including (if requested) any available informative/educational handouts regarding diagnosis, treatment, or medications. All questions were answered to patient/family satisfaction. Should symptoms fail to improve or worsen they agree to call or return to clinic or to go to the Emergency Department. Discussed the importance of following up with any needed screening tests/labs/specialist appointments and any requested follow-up  recommended by me today. Importance of maintaining follow-up discussed and patient accepts that missed appointments can delay diagnosis and potentially lead to worsening of conditions.    Return in about 9 weeks (around 1/4/2024) for Toenail Care., or sooner if acute issues arise.    This document has been electronically signed by Toby Gonzalez DPM on November 2, 2023 13:32 EDT

## 2023-11-06 DIAGNOSIS — R20.0 RIGHT ARM NUMBNESS: ICD-10-CM

## 2023-11-06 DIAGNOSIS — R20.0 RIGHT ARM NUMBNESS: Primary | ICD-10-CM

## 2023-11-07 ENCOUNTER — TELEPHONE (OUTPATIENT)
Dept: ORTHOPEDIC SURGERY | Facility: CLINIC | Age: 73
End: 2023-11-07
Payer: MEDICARE

## 2023-11-07 DIAGNOSIS — R20.0 RIGHT ARM NUMBNESS: Primary | ICD-10-CM

## 2023-11-07 NOTE — TELEPHONE ENCOUNTER
PATIENT NOTIFIED OF EMG RESULTS AND MRI OF CERVICAL SPINE. PATIENT STATED HE IS NOT ABLE TO HAVE AN MRI DUE TO HARDWARE IN LUMBAR SPINE. PATIENT ADVISED I WILL NOTIFY DR NULL OF THIS ISSUE AND SEE IF A CT COULD BE ORDERED INSTEAD. PATIENT VERBALIZED UNDERSTANDING.

## 2023-11-09 NOTE — TELEPHONE ENCOUNTER
PER DR. NULL PATIENT NOTIFIED WE ARE REFERRING HIM BACK TO [facility] SPINE WITH THE PHYSICIAN WHO DID HIS SPINE PROCEDURE. PATIENT ADVISED THEIR SCHEDULING DEPARTMENT WILL CONTACT HIM TO SCHEDULE. PATIENT ADVISED WE ARE CANCELING THE MRI. PATIENT VERBALIZED UNDERSTANDING.

## 2023-11-13 DIAGNOSIS — R20.0 RIGHT ARM NUMBNESS: Primary | ICD-10-CM

## 2023-12-01 ENCOUNTER — OFFICE VISIT (OUTPATIENT)
Dept: ORTHOPEDIC SURGERY | Facility: CLINIC | Age: 73
End: 2023-12-01
Payer: MEDICARE

## 2023-12-01 VITALS — HEIGHT: 71 IN | BODY MASS INDEX: 37.8 KG/M2 | OXYGEN SATURATION: 94 % | WEIGHT: 270 LBS

## 2023-12-01 DIAGNOSIS — G89.29 CHRONIC RIGHT SHOULDER PAIN: ICD-10-CM

## 2023-12-01 DIAGNOSIS — M17.11 PRIMARY OSTEOARTHRITIS OF RIGHT KNEE: Primary | ICD-10-CM

## 2023-12-01 DIAGNOSIS — R20.0 RIGHT ARM NUMBNESS: ICD-10-CM

## 2023-12-01 DIAGNOSIS — M25.511 CHRONIC RIGHT SHOULDER PAIN: ICD-10-CM

## 2023-12-01 RX ORDER — LIDOCAINE HYDROCHLORIDE 10 MG/ML
5 INJECTION, SOLUTION INFILTRATION; PERINEURAL
Status: COMPLETED | OUTPATIENT
Start: 2023-12-01 | End: 2023-12-01

## 2023-12-01 RX ORDER — TRIAMCINOLONE ACETONIDE 40 MG/ML
40 INJECTION, SUSPENSION INTRA-ARTICULAR; INTRAMUSCULAR
Status: COMPLETED | OUTPATIENT
Start: 2023-12-01 | End: 2023-12-01

## 2023-12-01 RX ADMIN — TRIAMCINOLONE ACETONIDE 40 MG: 40 INJECTION, SUSPENSION INTRA-ARTICULAR; INTRAMUSCULAR at 11:10

## 2023-12-01 RX ADMIN — TRIAMCINOLONE ACETONIDE 40 MG: 40 INJECTION, SUSPENSION INTRA-ARTICULAR; INTRAMUSCULAR at 11:11

## 2023-12-01 RX ADMIN — LIDOCAINE HYDROCHLORIDE 5 ML: 10 INJECTION, SOLUTION INFILTRATION; PERINEURAL at 11:10

## 2023-12-01 RX ADMIN — LIDOCAINE HYDROCHLORIDE 5 ML: 10 INJECTION, SOLUTION INFILTRATION; PERINEURAL at 11:11

## 2023-12-01 NOTE — PROGRESS NOTES
"Chief Complaint  Pain and Follow-up of the Right Knee    Subjective          Jonas Mercado presents to [facility] for   Pain    Jonas returns for follow-up of his right knee and right upper extremity.  He has right knee arthritis aware and treating nonoperatively.  He did very well with a gel injection of the right knee.  He is interested in a right knee steroid injection today.  He is doing physical therapy and making good progress.    He obtained his EMG.  He does have signs of carpal tunnel and cubital tunnel but also cervical nerve compression.  We attempted an MRI but he has had back surgery in the past and we are having difficulty getting imaging.  He has not seen his neurosurgeon recently.    Allergies   Allergen Reactions   • Aspirin Anaphylaxis        Social History     Socioeconomic History   • Marital status:    Tobacco Use   • Smoking status: Former     Packs/day: 0.50     Years: 4.00     Additional pack years: 0.00     Total pack years: 2.00     Types: Cigarettes     Start date:      Quit date: 1998     Years since quittin.8   • Smokeless tobacco: Never   Vaping Use   • Vaping Use: Never used   Substance and Sexual Activity   • Alcohol use: Not Currently     Comment: VERY RARE   • Drug use: Never   • Sexual activity: Defer        I reviewed the patient's chief complaint, history of present illness, review of systems, past medical history, surgical history, family history, social history, medications, and allergy list.     REVIEW OF SYSTEMS    Constitutional: Denies fevers, chills, weight loss  Cardiovascular: Denies chest pain, shortness of breath  Skin: Denies rashes, acute skin changes  Neurologic: Denies headache, loss of consciousness  MSK: Right knee and right arm pain      Objective   Vital Signs:   Ht 180.3 cm (71\")   Wt 122 kg (270 lb)   SpO2 94%   BMI 37.66 kg/m²     Body mass index is 37.66 kg/m².    Physical Exam    General: Alert. No " acute distress.   Right lower extremity: Skin intact, mild swelling, tenderness to palpation, sensation grossly intact, neuro intact, no skin discoloration, ambulates with mild antalgic gait, stable to varus and valgus stress ,Stable to anterior and posterior drawer, negative Lachman's, Negative Luisa's, lacks 10 of extension, flexion 115 degrees, varus alignment, positive crepitus, extensor mechanism intact, tender over medial knee .      Right upper extremity: Tender in the subacromial interval.  Assisted elevation 180 degrees with terminal pain.  Positive impingement.  Pain with rotator cuff testing.  External rotation at 30.  Internal rotation to the back pocket. No muscle atrophy noted.  Decreased sensation to light touch over the fourth and fifth digits.  Negative Tinel's at the elbow.  Full elbow range of motion.  Pain with cervical range of motion.  4-5  strength in the ulnar nerve distribution.     Large Joint Arthrocentesis: R knee  Date/Time: 12/1/2023 11:10 AM  Consent given by: patient  Site marked: site marked  Timeout: Immediately prior to procedure a time out was called to verify the correct patient, procedure, equipment, support staff and site/side marked as required   Supporting Documentation  Indications: pain   Procedure Details  Location: knee - R knee  Needle gauge: 21 G.  Medications administered: 5 mL lidocaine 1 %; 40 mg triamcinolone acetonide 40 MG/ML  Patient tolerance: patient tolerated the procedure well with no immediate complications      Large Joint Arthrocentesis  Date/Time: 12/1/2023 11:11 AM  Consent given by: patient  Site marked: site marked  Timeout: Immediately prior to procedure a time out was called to verify the correct patient, procedure, equipment, support staff and site/side marked as required   Supporting Documentation  Indications: pain   Procedure Details  Location: shoulder -   Needle gauge: 21 G.  Medications administered: 5 mL lidocaine 1 %; 40 mg  triamcinolone acetonide 40 MG/ML  Patient tolerance: patient tolerated the procedure well with no immediate complications      Imaging Results (Most Recent)       None                     Assessment and Plan        No results found.     Diagnoses and all orders for this visit:    1. Primary osteoarthritis of right knee (Primary)    2. Right arm numbness    3. Chronic right shoulder pain    Other orders  -     Large Joint Arthrocentesis: R knee  -     Large Joint Arthrocentesis        We reviewed his imaging and EMG.  We discussed follow-up with his neurosurgeon for further evaluation of his right upper extremity numbness.  We discussed that shoulder pain.  We discussed the risk, benefits, indications, and alternatives to a right shoulder subacromial injection.  He elected to proceed and tolerated the injection well.  We also discussed the risk, benefits, indications, and alternatives to a right knee steroid injection.  He elected to proceed and tolerated the injection well.  He will continue physical therapy.  He may follow-up in 3 months.  We may consider repeat injections if needed.        Call or return if worsening symptoms.    Scribed for Bennett Carson MD by Marilu Valle  12/01/2023   11:10 EST         Follow Up       3 months    Patient was given instructions and counseling regarding his condition or for health maintenance advice. Please see specific information pulled into the AVS if appropriate.       I have personally performed the services described in this document as scribed by the above individual and it is both accurate and complete.     Bennett Carson MD  12/01/23  11:41 EST

## 2024-04-17 ENCOUNTER — OFFICE VISIT (OUTPATIENT)
Dept: ORTHOPEDIC SURGERY | Facility: CLINIC | Age: 74
End: 2024-04-17
Payer: MEDICARE

## 2024-04-17 VITALS
SYSTOLIC BLOOD PRESSURE: 119 MMHG | BODY MASS INDEX: 37.8 KG/M2 | DIASTOLIC BLOOD PRESSURE: 76 MMHG | WEIGHT: 270 LBS | HEART RATE: 77 BPM | HEIGHT: 71 IN | OXYGEN SATURATION: 92 %

## 2024-04-17 DIAGNOSIS — R20.0 RIGHT ARM NUMBNESS: Primary | ICD-10-CM

## 2024-04-17 DIAGNOSIS — G89.29 CHRONIC RIGHT SHOULDER PAIN: ICD-10-CM

## 2024-04-17 DIAGNOSIS — M25.511 CHRONIC RIGHT SHOULDER PAIN: ICD-10-CM

## 2024-04-17 DIAGNOSIS — M19.011 PRIMARY OSTEOARTHRITIS OF RIGHT SHOULDER: ICD-10-CM

## 2024-04-17 RX ORDER — TRIAMCINOLONE ACETONIDE 40 MG/ML
40 INJECTION, SUSPENSION INTRA-ARTICULAR; INTRAMUSCULAR
Status: COMPLETED | OUTPATIENT
Start: 2024-04-17 | End: 2024-04-17

## 2024-04-17 RX ORDER — LIDOCAINE HYDROCHLORIDE 10 MG/ML
5 INJECTION, SOLUTION INFILTRATION; PERINEURAL
Status: COMPLETED | OUTPATIENT
Start: 2024-04-17 | End: 2024-04-17

## 2024-04-17 RX ADMIN — LIDOCAINE HYDROCHLORIDE 5 ML: 10 INJECTION, SOLUTION INFILTRATION; PERINEURAL at 13:36

## 2024-04-17 RX ADMIN — TRIAMCINOLONE ACETONIDE 40 MG: 40 INJECTION, SUSPENSION INTRA-ARTICULAR; INTRAMUSCULAR at 13:36

## 2024-04-17 NOTE — PROGRESS NOTES
"Chief Complaint  Follow-up and Pain of the Right Shoulder    Subjective          Jonas Mercado presents to [facility] for   History of Present Illness    The patient presents here today for follow up evaluation of the right shoulder. The patient has been treating his right shoulder pain and numbness conservatively. He reports the last injection did not give him relief.     Allergies   Allergen Reactions    Aspirin Anaphylaxis        Social History     Socioeconomic History    Marital status:    Tobacco Use    Smoking status: Former     Current packs/day: 0.00     Average packs/day: 0.5 packs/day for 4.1 years (2.0 ttl pk-yrs)     Types: Cigarettes     Start date:      Quit date: 1998     Years since quittin.2    Smokeless tobacco: Never   Vaping Use    Vaping status: Never Used   Substance and Sexual Activity    Alcohol use: Not Currently     Comment: VERY RARE    Drug use: Never    Sexual activity: Defer        I reviewed the patient's chief complaint, history of present illness, review of systems, past medical history, surgical history, family history, social history, medications, and allergy list.     REVIEW OF SYSTEMS    Constitutional: Denies fevers, chills, weight loss  Cardiovascular: Denies chest pain, shortness of breath  Skin: Denies rashes, acute skin changes  Neurologic: Denies headache, loss of consciousness  MSK: right upper extremity pain      Objective   Vital Signs:   /76   Pulse 77   Ht 180.3 cm (71\")   Wt 122 kg (270 lb)   SpO2 92%   BMI 37.66 kg/m²     Body mass index is 37.66 kg/m².    Physical Exam    General: Alert. No acute distress.   Right upper extremity- tender in the subacromial interval.  Assisted elevation 180 degrees with terminal pain.  Positive impingement.  Pain with rotator cuff testing.  External rotation at 30.  Internal rotation to the back pocket. No muscle atrophy noted.  Decreased sensation to light touch over " the fourth and fifth digits.  Negative Tinel's at the elbow.  Full elbow range of motion.  Pain with cervical range of motion.  4-5  strength in the ulnar nerve distribution.     Large Joint Arthrocentesis  Date/Time: 4/17/2024 1:36 PM  Consent given by: patient  Site marked: site marked  Timeout: Immediately prior to procedure a time out was called to verify the correct patient, procedure, equipment, support staff and site/side marked as required   Supporting Documentation  Indications: pain   Procedure Details  Location: shoulder (RIGHT) -   Needle gauge: 21 G.  Medications administered: 5 mL lidocaine 1 %; 40 mg triamcinolone acetonide 40 MG/ML  Patient tolerance: patient tolerated the procedure well with no immediate complications      This injection documentation was Scribed for Bennett Carson MD by Marilu Valle.  04/17/24   13:37 EDT    Imaging Results (Most Recent)       Procedure Component Value Units Date/Time    XR Shoulder 2+ View Right [[id number]] Resulted: 04/17/24 1528     Updated: 04/17/24 1529    Narrative:      Indications: Right shoulder pain    Views: AP, Grashey, Scap Y, axillary right shoulder    Findings: Advanced degenerative changes with complete loss of articular   height and bone loss.  Glenohumeral joint reduced.  No fractures.    Comparative Data: No comparative data available           EMG- Right moderate carpal tunnel syndrome and cubital tunnel syndrome. Right C7 and C8 nerve root pathology.           Assessment and Plan        XR Shoulder 2+ View Right    Result Date: 4/17/2024  Narrative: Indications: Right shoulder pain Views: AP, Grashey, Scap Y, axillary right shoulder Findings: Advanced degenerative changes with complete loss of articular height and bone loss.  Glenohumeral joint reduced.  No fractures. Comparative Data: No comparative data available      Diagnoses and all orders for this visit:    1. Right arm numbness (Primary)    2. Chronic right shoulder pain  -     XR  Shoulder 2+ View Right    3. Primary osteoarthritis of right shoulder    Other orders  -     Large Joint Arthrocentesis        Discussed the treatment plan with the patient.  I reviewed the x-rays that were obtained today with the patient. The patient has advanced osteoarthritis in his shoulder. Discussed the risks and benefits of a right shoulder intra-articular joint injection. The patient expressed understanding and wished to proceed. He tolerated the injection well. I advised him to talk to his neurosurgeon about his neck        Call or return if worsening symptoms.    Scribed for Bennett Carson MD by Chyna Nolan  04/17/2024   12:55 EDT         Follow Up       3 months    Patient was given instructions and counseling regarding his condition or for health maintenance advice. Please see specific information pulled into the AVS if appropriate.       I have personally performed the services described in this document as scribed by the above individual and it is both accurate and complete.     Bennett Carson MD  04/17/24  15:40 EDT

## 2024-04-18 ENCOUNTER — OFFICE VISIT (OUTPATIENT)
Dept: SLEEP MEDICINE | Facility: HOSPITAL | Age: 74
End: 2024-04-18
Payer: MEDICARE

## 2024-04-18 VITALS
OXYGEN SATURATION: 95 % | HEART RATE: 87 BPM | WEIGHT: 260.8 LBS | BODY MASS INDEX: 36.51 KG/M2 | DIASTOLIC BLOOD PRESSURE: 73 MMHG | SYSTOLIC BLOOD PRESSURE: 139 MMHG | HEIGHT: 71 IN

## 2024-04-18 DIAGNOSIS — G47.33 OSA (OBSTRUCTIVE SLEEP APNEA): Primary | ICD-10-CM

## 2024-04-18 PROCEDURE — G0463 HOSPITAL OUTPT CLINIC VISIT: HCPCS

## 2024-04-18 RX ORDER — SEMAGLUTIDE 2.68 MG/ML
2 INJECTION, SOLUTION SUBCUTANEOUS
COMMUNITY
Start: 2024-04-04 | End: 2025-04-04

## 2024-04-18 NOTE — PROGRESS NOTES
"[facility] PULMONARY CARE         Dr Trudy Anderson  [unfilled]  Patient Care Team:  Tristan Phipps MD as PCP - General (Family Medicine)  Nicky Robertson MD as Consulting Physician (Urology)    Chief Complaint:it showed KARLA with AHI of 7.2 events per hour and with titration of CPAP at 14 cm     Interval History: Patient last seen 9/2022.  Currently set up CPAP 14 cm.  Compliance I have until July 2023 which reflects 97% compliance average daily use 8 hours 6 minutes.  AHI leak within normal limits.  He is benefiting from CPAP.  His current modem is not downloading.  Goes to bed 10 PM gets up 730 gets about 8+ hours of sleep more rested with CPAP.  No tobacco no alcohol no caffeine abuse.  Currently has a nasal mask that fits well.  Supplies been adequate.  [place] 5 out of 24 within normal limits.  Review of system positive for dry mouth.    REVIEW OF SYSTEMS:   CARDIOVASCULAR: No chest pain, chest pressure or chest discomfort. No palpitations or edema.   RESPIRATORY: No shortness of breath, cough or sputum.   GASTROINTESTINAL: No anorexia, nausea, vomiting or diarrhea. No abdominal pain or blood.   HEMATOLOGIC: No bleeding or bruising.     Ventilator/Non-Invasive Ventilation Settings (From admission, onward)      None              Vital Signs  Heart Rate:  [77-87] 87  BP: (119-139)/(73-76) 139/73  [unfilled]  Flowsheet Rows      Flowsheet Row First Filed Value   Admission Height 181.6 cm (71.5\") Documented at 04/18/2024 0900   Admission Weight 118 kg (260 lb 12.8 oz) Documented at 04/18/2024 0900            Physical Exam:  Patient is examined using the personal protective equipment as per guidelines from infection control for this particular patient as enacted.  Hand hygiene was performed before and after patient interaction.   General Appearance:    Alert, cooperative, in no acute distress.  Following simple commands  ENT Mallampati between 3 and 4 no nasal congestion  Neck midline trachea, no thyromegaly "   Lungs:     Clear to auscultation, respirations regular, even and                  unlabored    Heart:    Regular rhythm and normal rate, normal S1 and S2, no            murmur, no gallop, no rub, no click   Chest Wall:    No abnormalities observed   Abdomen:     Normal bowel sounds, no masses, no organomegaly, soft        nontender, nondistended, no guarding, no rebound                tenderness   Extremities:   Moves all extremities well, no edema, no cyanosis, no             redness  CNS no focal neurological deficits normal sensory exam  Skin no rashes no nodules  Musculoskeletal no cyanosis no clubbing normal range of motion     Results Review:                                          I reviewed the patient's new clinical results.  I personally viewed and interpreted the patient's chest x-ray.        Medication Review:       No current facility-administered medications for this visit.      ASSESSMENT:   1.  Obstructive sleep apnea, on CPAP.    2.  Morbid obesity (BMI = 40).    3.  Resistant hypertension.    4.  Hypertension  5 diabetes mellitus    PLAN:  Reviewed compliance download with the patient  Will attempt to get him a new smart card  Continue current CPAP 14 cm  Sleep hygiene measures  Treatment of underlying comorbidities  Will follow-up in 1 year      Trudy Anderson MD  04/18/24  10:47 EDT

## 2024-05-02 ENCOUNTER — OFFICE VISIT (OUTPATIENT)
Dept: PODIATRY | Facility: CLINIC | Age: 74
End: 2024-05-02
Payer: MEDICARE

## 2024-05-02 VITALS
SYSTOLIC BLOOD PRESSURE: 124 MMHG | HEIGHT: 71 IN | DIASTOLIC BLOOD PRESSURE: 71 MMHG | WEIGHT: 265 LBS | OXYGEN SATURATION: 96 % | HEART RATE: 71 BPM | BODY MASS INDEX: 37.1 KG/M2

## 2024-05-02 DIAGNOSIS — G62.9 NEUROPATHY: ICD-10-CM

## 2024-05-02 DIAGNOSIS — M79.672 FOOT PAIN, BILATERAL: Primary | ICD-10-CM

## 2024-05-02 DIAGNOSIS — E11.9 NON-INSULIN DEPENDENT TYPE 2 DIABETES MELLITUS: ICD-10-CM

## 2024-05-02 DIAGNOSIS — B35.1 ONYCHOMYCOSIS: ICD-10-CM

## 2024-05-02 DIAGNOSIS — E11.8 DIABETIC FOOT: ICD-10-CM

## 2024-05-02 DIAGNOSIS — M79.671 FOOT PAIN, BILATERAL: Primary | ICD-10-CM

## 2024-05-02 DIAGNOSIS — M72.2 PLANTAR FASCIITIS: ICD-10-CM

## 2024-05-02 DIAGNOSIS — L60.0 ONYCHOCRYPTOSIS: ICD-10-CM

## 2024-05-02 RX ORDER — TRAMADOL HYDROCHLORIDE 50 MG/1
50 TABLET ORAL
COMMUNITY
Start: 2024-04-23

## 2024-05-02 RX ORDER — LIDOCAINE 50 MG/G
1 PATCH TOPICAL EVERY 24 HOURS
COMMUNITY
Start: 2024-04-25 | End: 2024-05-25

## 2024-05-02 RX ORDER — TRIAMCINOLONE ACETONIDE 40 MG/ML
20 INJECTION, SUSPENSION INTRA-ARTICULAR; INTRAMUSCULAR ONCE
Status: COMPLETED | OUTPATIENT
Start: 2024-05-02 | End: 2024-05-02

## 2024-05-02 RX ORDER — TESTOSTERONE CYPIONATE 200 MG/ML
4 INJECTION INTRAMUSCULAR ONCE
Status: COMPLETED | OUTPATIENT
Start: 2024-05-02 | End: 2024-05-02

## 2024-05-02 RX ORDER — BUPIVACAINE HYDROCHLORIDE 5 MG/ML
0.5 INJECTION, SOLUTION PERINEURAL ONCE
Status: COMPLETED | OUTPATIENT
Start: 2024-05-02 | End: 2024-05-02

## 2024-05-02 RX ORDER — AMOXICILLIN AND CLAVULANATE POTASSIUM 875; 125 MG/1; MG/1
1 TABLET, FILM COATED ORAL 2 TIMES DAILY
COMMUNITY

## 2024-05-02 RX ADMIN — TESTOSTERONE CYPIONATE 4 MG: 200 INJECTION INTRAMUSCULAR at 13:51

## 2024-05-02 RX ADMIN — BUPIVACAINE HYDROCHLORIDE 0.5 ML: 5 INJECTION, SOLUTION PERINEURAL at 13:51

## 2024-05-02 RX ADMIN — TRIAMCINOLONE ACETONIDE 20 MG: 40 INJECTION, SUSPENSION INTRA-ARTICULAR; INTRAMUSCULAR at 13:51

## 2024-05-02 NOTE — PROGRESS NOTES
"    [facility]    Today's Date: 05/02/24    Patient Name: Jonas Mercado  MRN: [id number]  CSN: [id number]  PCP: Tristan Phipps MD, Last PCP Visit: 4/21/2024  Referring Provider: No ref. provider found    SUBJECTIVE     Chief Complaint   Patient presents with    Left Foot - Diabetes, Follow-up     RTC    Right Foot - Diabetes, Follow-up     RTC     HPI: Jonas Mercado, a 73 y.o.male, comes to clinic.    New, Established, New Problem:  established     Location:  Toenails    Duration:   Greater than five years    Onset:  Gradual    Nature:  sore with palpation.    Stable, worsening, improving:   Stable    Aggravating factors:  Pain with shoe gear and ambulation.    Previous Treatment:  debridement    Patient relates recurrence of left heel pain request cortisone injection today.  __________________________________    NIDDM    Patient reported last blood glucose:  110  __________________________________    Patient reports the following medical changes since their last visit: none    Patient denies any fevers, chills, nausea, vomiting, shortness of breathe, nor any other constitutional signs nor symptoms.       Past Medical History:   Diagnosis Date    Arthritis     Back pain     Bilateral ankle pain     NEUROPATHY    Corns and callus     Coronary artery disease     \"STIFF HEART\" ON BB, NO CARDIOLOGIST, PCP (-)CP OR SOA    Diabetes mellitus type 2, controlled     BG RUNS IN 'S IN AM    Hammertoe     Heel pain     Hyperlipemia     Hypertension     Left foot pain     Lymphedema     NO PROBLEMS LATELY    Numbness in feet     Pulmonary embolism 2013    SADDLE PE, RESOLVED     Past Surgical History:   Procedure Laterality Date    BACK SURGERY  11/2021    S1-L1 FUSION, CYST REMOVED (TOTAL OF 3 SURGERY 2016 TO 2021)    CARPAL TUNNEL RELEASE Right     COLONOSCOPY  2014    CYST REMOVAL      BACK    FOOT SURGERY Right     ACHELLIS TENDON/HAMMER TOE    HYDROCELECTOMY Left 5/12/2023    " Procedure: HYDROCELECTOMY, left;  Surgeon: Nicky Robertson MD;  Location: [facility] MAIN OR;  Service: Urology;  Laterality: Left;    KNEE ARTHROSCOPY Right     DIAGNOSTIC    TEMPORAL ARTERY BIOPSY Right 2022    Procedure: Right temporal artery biopsy;  Surgeon: Wilbert Jacob MD;  Location: [facility] MAIN OR;  Service: Vascular;  Laterality: Right;     Family History   Problem Relation Age of Onset    Stroke Father     Heart disease Brother     Malamie Hyperthermia Neg Hx      Social History     Socioeconomic History    Marital status:    Tobacco Use    Smoking status: Former     Current packs/day: 0.00     Average packs/day: 0.5 packs/day for 4.1 years (2.0 ttl pk-yrs)     Types: Cigarettes     Start date:      Quit date: 1998     Years since quittin.2    Smokeless tobacco: Never   Vaping Use    Vaping status: Never Used   Substance and Sexual Activity    Alcohol use: Not Currently     Comment: VERY RARE    Drug use: Never    Sexual activity: Defer     Allergies   Allergen Reactions    Aspirin Anaphylaxis     Current Outpatient Medications   Medication Sig Dispense Refill    albuterol sulfate  (90 Base) MCG/ACT inhaler 1 puff Every 6 (Six) Hours As Needed.      allopurinol (ZYLOPRIM) 300 MG tablet Take 1 tablet by mouth Daily. 90 tablet 0    amLODIPine (NORVASC) 5 MG tablet TAKE 1 TABLET DAILY 90 tablet 3    amoxicillin-clavulanate (AUGMENTIN) 875-125 MG per tablet Take 1 tablet by mouth 2 (Two) Times a Day.      atorvastatin (LIPITOR) 20 MG tablet Take 1 tablet by mouth Daily. 90 tablet 0    Diclofenac Sodium (VOLTAREN) 1 % gel gel Apply 4 g topically to the appropriate area as directed 4 (Four) Times a Day As Needed.      DULoxetine (CYMBALTA) 60 MG capsule Take 1 capsule by mouth Daily.      Jardiance 10 MG tablet tablet Take 1 tablet by mouth Daily.      lidocaine (LIDODERM) 5 % Place 1 patch on the skin as directed by provider Daily.      lisinopril (PRINIVIL,ZESTRIL) 40 MG  tablet Take 1 tablet by mouth Daily. 90 tablet 0    metFORMIN (GLUCOPHAGE) 500 MG tablet Take 2 tablets by mouth 2 (Two) Times a Day With Meals. INSTRUCTED PER ANESTHESIA STANDING ORDERS      metoprolol succinate XL (TOPROL-XL) 200 MG 24 hr tablet Take 1 tablet by mouth Daily. 90 tablet 0    Ozempic, 2 MG/DOSE, 8 MG/3ML solution pen-injector Inject 2 mg under the skin into the appropriate area as directed.      pregabalin (LYRICA) 150 MG capsule Take 1 capsule by mouth 2 (Two) Times a Day. 180 capsule 0    tamsulosin (FLOMAX) 0.4 MG capsule 24 hr capsule Take 1 capsule by mouth Daily.      traMADol (ULTRAM) 50 MG tablet Take 1 tablet by mouth.       Current Facility-Administered Medications   Medication Dose Route Frequency Provider Last Rate Last Admin    bupivacaine (MARCAINE) 0.5 % injection 0.5 mL  0.5 mL Injection Once Toby Gonzalez DPM        dexAMETHasone sodium phosphate injection 4 mg  4 mg Intra-articular Once Toby Gonzalez DPM        triamcinolone acetonide (KENALOG-40) injection 20 mg  20 mg Intramuscular Once Toby Gonzalez DPM         Review of Systems   Constitutional: Negative.    Musculoskeletal:         Left heel pain   Skin:         Toenails 1 through 5 bilaterally   Neurological:  Positive for numbness.   All other systems reviewed and are negative.      OBJECTIVE     Vitals:    05/02/24 1324   BP: 124/71   Pulse: 71   SpO2: 96%         Lab Results   Component Value Date    HGBA1C 8.20 (H) 04/03/2023       Lab Results   Component Value Date    GLUCOSE 266 (H) 01/19/2022    CALCIUM 8.9 01/19/2022     01/19/2022    K 4.0 01/19/2022    CO2 25.4 01/19/2022    CL 98 01/19/2022    BUN 22 01/19/2022    CREATININE 1.05 01/19/2022    EGFRIFAFRI >60 10/21/2021    EGFRIFNONA 70 01/19/2022    BCR 21.0 01/19/2022    ANIONGAP 12.6 01/19/2022       Patient seen in no apparent distress.      PHYSICAL EXAM:     Foot/Ankle Exam    GENERAL  Diabetic foot exam performed     Appearance:  obese and elderly  Orientation:  AAOx3  Affect:  appropriate  Gait:  unimpaired  Assistance:  independent  Right shoe gear: casual shoe  Left shoe gear: casual shoe    VASCULAR     Right Foot Vascularity   Dorsalis pedis:  1+  Posterior tibial:  1+  Skin temperature:  cool  Edema grading:  Pitting and 2+  CFT:  3  Pedal hair growth:  Absent  Varicosities:  mild varicosities     Left Foot Vascularity   Dorsalis pedis:  1+  Posterior tibial:  1+  Skin temperature:  cool  Edema gradin+ and pitting  CFT:  3  Pedal hair growth:  Absent  Varicosities:  mild varicosities     NEUROLOGIC     Right Foot Neurologic   Light touch sensation: diminished  Vibratory sensation: diminished  Hot/Cold sensation: diminished  Protective Sensation using Hudson-Cheryl Monofilament:   Sites intact: 2  Sites tested: 10     Left Foot Neurologic   Light touch sensation: diminished  Vibratory sensation: diminished  Hot/Cold sensation:  diminished  Protective Sensation using Hudson-Cheryl Monofilament:   Sites intact: 2  Sites tested: 10    MUSCULOSKELETAL     Left Foot Musculoskeletal   Tenderness:  plantar fascia tenderness    MUSCLE STRENGTH     Right Foot Muscle Strength   Foot dorsiflexion:  4  Foot plantar flexion:  4  Foot inversion:  4  Foot eversion:  4     Left Foot Muscle Strength   Foot dorsiflexion:  4  Foot plantar flexion:  4  Foot inversion:  4  Foot eversion:  4    DERMATOLOGIC      Right Foot Dermatologic   Skin  Right foot skin is intact.   Nails  1.  Positive for elongated, onychomycosis, abnormal thickness, subungual debris and ingrown toenail.  2.  Positive for elongated, onychomycosis, abnormal thickness, subungual debris and ingrown toenail.  3.  Positive for elongated, onychomycosis, abnormal thickness, subungual debris and ingrown toenail.  4.  Positive for elongated, onychomycosis, abnormal thickness, subungual debris and ingrown toenail.  5.  Positive for elongated, onychomycosis, abnormal  thickness, subungual debris and ingrown toenail.     Left Foot Dermatologic   Skin  Left foot skin is intact.   Nails  1.  Positive for elongated, onychomycosis, abnormal thickness, subungual debris and ingrown toenail.  2.  Positive for elongated, onychomycosis, abnormal thickness, subungual debris and ingrown toenail.  3.  Positive for elongated, onychomycosis, abnormal thickness, subungual debris and ingrown toenail.  4.  Positive for elongated, onychomycosis, abnormally thick, subungual debris and ingrown toenail.  5.  Positive for elongated, onychomycosis, abnormally thick, subungual debris and ingrown toenail.    Diabetic Foot Exam Performed and Monofilament Test Performed      ASSESSMENT/PLAN     Diagnoses and all orders for this visit:    1. Foot pain, bilateral (Primary)    2. Non-insulin dependent type 2 diabetes mellitus    3. Neuropathy    4. Onychomycosis    5. Onychocryptosis    6. Diabetic foot    7. Plantar fasciitis  -     dexAMETHasone sodium phosphate injection 4 mg  -     bupivacaine (MARCAINE) 0.5 % injection 0.5 mL  -     triamcinolone acetonide (KENALOG-40) injection 20 mg    Comprehensive lower extremity examination and evaluation was performed.    Discussed findings and treatment plan including risks, benefits, and treatment options with patient in detail. Patient agreed with treatment plan.    Toenails 1 through 5 bilaterally were debrided in thickness and length and then smoothed with a Dremel Tool.  Tolerated the procedure well without complications.    Plantar Fasciitis Injection  Date/Time: 5/2/2024  Performed by: Toby Gonzalez DPM  Authorized by: Toby Gonzalez DPM     Consent: Verbal consent obtained. Written consent obtained.  Risks and benefits: risks, benefits and alternatives were discussed  Consent given by: patient  Patient identity confirmed: verbally with patient  Indications: pain relief    Injection site: Left heel.    Sedation:  Patient sedated: no    Patient  position: sitting  Needle size: 27 G  Local anesthetic: 0.5 ml 0.5% Marcaine plain, 0.5 ml Kenalog 40 mg/ml, and 0.5 ml Decadron 4 mg/mL.   Outcome: pain improved  Patient tolerance: Patient tolerated the procedure well with no immediate complications    Patient may begin to weight bear as tolerated in supportive shoes.  No impact activities for two weeks.  After that time, the patient may increase activities as tolerated. Patient states understanding and agreement with this plan.    Rice Therapy: It is important to treat any injury as soon as possible to help control swelling and increase recovery time. The recognized regimen for immediate treatment of sport injuries includes rest, ice (cold application), compression, and elevation (RICE). Remove the injured athlete from play, apply ice to the affected area, wrap or compress the injured area with an elastic bandage when appropriate, and elevate the injured area above heart level to reduce swelling.  The patient is to not use ice for longer than 20 minutes at a time, with at least 20 minutes of no ice usage between applications.  The patient states understanding and agreement with this plan.    Patient instructed use OTC analgesics with dosing per package insert as needed.  Patient states understanding and agreement with this plan.    Diabetic foot exam performed and documented this date, compliant with CQM required standards. Detail of findings as noted in physical exam.  Lower extremity Neurologic exam for diabetic patient performed and documented this date, compliant with PQRS required standards. Detail of findings as noted in physical exam.  Advised patient importance of good routine lower extremity hygiene. Advised patient importance of evaluating for intact skin and pain free nail borders.  Advised patient to use mirror to evaluate plantar/ soles of feet for better visualization. Advised patient monitor and phone office to be seen if any cracking to skin, open  lesions, painful nail borders or if nails become elongated prior to next visit. Advised patient importance of daily cleansing of lower extremities, followed by good skin cream to maintain normal hydration of skin. Also advised patient importance of close daily monitoring of blood sugar. Advised to regulate diet and medications to maintain control of blood sugar in optimal range. Contact primary care provider if difficulties maintaining blood sugar levels.  Advised Patient of presence of Diabetes Mellitus condition.  Advised Patient risk of progression and worsening or improvement, then return of condition.  Will monitor condition for any change in future. Treat with most appropriate treatment pending status of condition.  Counseled and advised patient extensively on nature and ramifications of diabetes. Standard instructions given to patient for good diabetic foot care and maintenance. Advised importance of careful monitoring to avoid break down and complications secondary to diabetes. Advised patient importance of strict maintenance of blood sugar control. Advised patient of possible ominous results from neglect of condition, i.e.: amputation/ loss of digits, feet and legs, or even death.  Patient states understands counseling, will monitor closely, continue good hygiene and routine diabetic foot care. Patient will contact office is questions or problems.      An After Visit Summary was printed and given to the patient at discharge, including (if requested) any available informative/educational handouts regarding diagnosis, treatment, or medications. All questions were answered to patient/family satisfaction. Should symptoms fail to improve or worsen they agree to call or return to clinic or to go to the Emergency Department. Discussed the importance of following up with any needed screening tests/labs/specialist appointments and any requested follow-up recommended by me today. Importance of maintaining follow-up  discussed and patient accepts that missed appointments can delay diagnosis and potentially lead to worsening of conditions.    Return in about 9 weeks (around 7/4/2024) for Toenail Care., or sooner if acute issues arise.    This document has been electronically signed by Toby Gonzalez DPM on May 2, 2024 13:49 EDT

## 2024-06-03 ENCOUNTER — HOSPITAL ENCOUNTER (OUTPATIENT)
Dept: OTHER | Facility: HOSPITAL | Age: 74
Discharge: HOME | End: 2024-06-03

## 2024-06-15 ENCOUNTER — NURSING HOME (OUTPATIENT)
Dept: INTERNAL MEDICINE | Age: 74
End: 2024-06-15
Payer: MEDICARE

## 2024-06-15 DIAGNOSIS — E78.2 MIXED HYPERLIPIDEMIA: ICD-10-CM

## 2024-06-15 DIAGNOSIS — E11.9 TYPE 2 DIABETES MELLITUS WITHOUT COMPLICATION, UNSPECIFIED WHETHER LONG TERM INSULIN USE: ICD-10-CM

## 2024-06-15 DIAGNOSIS — E55.9 VITAMIN D DEFICIENCY: ICD-10-CM

## 2024-06-15 DIAGNOSIS — I50.23 ACUTE ON CHRONIC SYSTOLIC (CONGESTIVE) HEART FAILURE: ICD-10-CM

## 2024-06-15 DIAGNOSIS — E11.65 TYPE 2 DIABETES MELLITUS WITH HYPERGLYCEMIA, WITHOUT LONG-TERM CURRENT USE OF INSULIN: ICD-10-CM

## 2024-06-15 DIAGNOSIS — M48.04 SPINAL STENOSIS OF THORACIC REGION: Primary | ICD-10-CM

## 2024-06-15 DIAGNOSIS — I10 PRIMARY HYPERTENSION: ICD-10-CM

## 2024-06-15 DIAGNOSIS — N40.0 BENIGN PROSTATIC HYPERPLASIA, UNSPECIFIED WHETHER LOWER URINARY TRACT SYMPTOMS PRESENT: ICD-10-CM

## 2024-06-15 DIAGNOSIS — M10.9 GOUT, UNSPECIFIED CAUSE, UNSPECIFIED CHRONICITY, UNSPECIFIED SITE: ICD-10-CM

## 2024-06-15 DIAGNOSIS — F33.1 MAJOR DEPRESSIVE DISORDER, RECURRENT, MODERATE: ICD-10-CM

## 2024-06-15 NOTE — PROGRESS NOTES
"Nursing Home History and Physical Note      Sunny Aquino MD  [x]  [address], [address]  [place] [address]  Phone: [phone]  Fax: [phone]     PATIENT NAME: Jonas Mercado                                                                          YOB: 1950            DATE OF SERVICE: 06/15/2024  FACILITY:   [] [facility]   [x] [facility]  [] [facility]  [] Other      HISTORY OF PRESENT ILLNESS: Patient is a pleasant 73-year-old gentleman who says he is not sure why he is here, he knows he is weak, he is confused at baseline he is pleasant he is in no distress he is sitting partially up in the bed reclined, I reviewed his records.  It was admitted to [facility] around June 6, 2024.  Discharge Kimberly 10, 2024 at our facility, his admitting diagnosis was spinal stenosis and then thoracic stenosis he also had acute heart failure with reduced ejection fraction he has underlying diabetes hypertension obesity and obstructive sleep apnea.  According to his hospital course he was transferred for management of thoracic spinal stenosis he had been diagnosed with pneumonia and congestive heart failure, he had spine surgery and seemed to tolerate the procedure well, he became weak and lethargic after surgery and he was deconditioned and he was discharged to our facility for further rehab.,  He was sent out on some pain medications, stool softeners, he was also sent out on some antibiotics for another day or so      PAST MEDICAL & SURGICAL HISTORY:   Past Medical History:   Diagnosis Date    Arthritis     Back pain     Bilateral ankle pain     NEUROPATHY    Corns and callus     Coronary artery disease     \"STIFF HEART\" ON BB, NO CARDIOLOGIST, PCP (-)CP OR SOA    Diabetes mellitus type 2, controlled     BG RUNS IN 'S IN AM    Hammertoe     Heel pain     Hyperlipemia     Hypertension     Left foot pain  "    Lymphedema     NO PROBLEMS LATELY    Numbness in feet     Pulmonary embolism 2013    SADDLE PE, RESOLVED   Questionable dementia versus delirium  Past Surgical History:   Procedure Laterality Date    BACK SURGERY  2021    S1-L1 FUSION, CYST REMOVED (TOTAL OF 3 SURGERY  TO )    CARPAL TUNNEL RELEASE Right     COLONOSCOPY      CYST REMOVAL      BACK    FOOT SURGERY Right     ACHELLIS TENDON/HAMMER TOE    HYDROCELECTOMY Left 2023    Procedure: HYDROCELECTOMY, left;  Surgeon: Nicky Robertson MD;  Location: [facility] MAIN OR;  Service: Urology;  Laterality: Left;    KNEE ARTHROSCOPY Right     DIAGNOSTIC    TEMPORAL ARTERY BIOPSY Right 2022    Procedure: Right temporal artery biopsy;  Surgeon: Wilbert Jacob MD;  Location: [facility] MAIN OR;  Service: Vascular;  Laterality: Right;          MEDICATIONS:  I have reviewed and reconciled the patients medication list in the patients chart at the skilled nursing facility today.      ALLERGIES:  Allergies   Allergen Reactions    Aspirin Anaphylaxis         SOCIAL HISTORY:  Social History     Socioeconomic History    Marital status:    Tobacco Use    Smoking status: Former     Current packs/day: 0.00     Average packs/day: 0.5 packs/day for 4.1 years (2.0 ttl pk-yrs)     Types: Cigarettes     Start date:      Quit date: 1998     Years since quittin.3    Smokeless tobacco: Never   Vaping Use    Vaping status: Never Used   Substance and Sexual Activity    Alcohol use: Not Currently     Comment: VERY RARE    Drug use: Never    Sexual activity: Defer       FAMILY HISTORY:  Family History   Problem Relation Age of Onset    Stroke Father     Heart disease Brother     Malig Hyperthermia Neg Hx          PHYSICAL EXAMINATION:   VITAL SIGNS: 119/58 pulse of 94 respiratory rate 18 temperature 97.6 and sat 98% on room air, he had a blood sugar of 171    PHYSICAL EXAM: On exam he is awake alert he is pleasant he is confused he follows simple  commands he can raise his arms he can raise his legs up, his abdomen is obese soft nontender, he has 3+ DP and PT pulses bilaterally, cardiac exam reveals a regular rhythm without appreciable murmur, his lungs are clear laterally and anteriorly he moves all 4 extremities well and again his abdomen is obese soft nontender, neck is supple, skin is somewhat pale and dry throughout got a few ecchymosis and appears to be a skin tear on the left forearm, back incision is down the mid back area with minimal swelling and no drainage no open areas no redness otherwise, is a very long incision throughout the entire thoracic spine      RECORDS REVIEW:   Orders Reviewed.  Labs Reviewed.      ICD-10-CM ICD-9-CM   1. Spinal stenosis of thoracic region  M48.04 724.01   2. Acute on chronic systolic (congestive) heart failure  I50.23 428.23     428.0   3. Benign prostatic hyperplasia, unspecified whether lower urinary tract symptoms present  N40.0 600.00   4. Primary hypertension  I10 401.9   5. Type 2 diabetes mellitus with hyperglycemia, without long-term current use of insulin  E11.65 250.00     790.29   6. Gout, unspecified cause, unspecified chronicity, unspecified site  M10.9 274.9   7. Mixed hyperlipidemia  E78.2 272.2   8. Vitamin D deficiency  E55.9 268.9   9. Major depressive disorder, recurrent, moderate  F33.1 296.32   10. Type 2 diabetes mellitus without complication, unspecified whether long term insulin use  E11.9 250.00       ASSESSMENT/PLAN    Diagnoses and all orders for this visit:    1. Spinal stenosis of thoracic region (Primary)    2. Acute on chronic systolic (congestive) heart failure    3. Benign prostatic hyperplasia, unspecified whether lower urinary tract symptoms present    4. Primary hypertension    5. Type 2 diabetes mellitus with hyperglycemia, without long-term current use of insulin    6. Gout, unspecified cause, unspecified chronicity, unspecified site    7. Mixed hyperlipidemia    8. Vitamin D  deficiency    9. Major depressive disorder, recurrent, moderate    10. Type 2 diabetes mellitus without complication, unspecified whether long term insulin use       Spinal stenosis thoracic area status post surgery in [place], with low back pain mid back pain, continues Lyrica 150 mg twice a day, has as needed hydrocodone 7.5's every 6 hours as needed    Recent pneumonia was treated with antibiotics Augmentin    Congestive heart failure, continues Lasix 40 mg daily    Gout, continues allopurinol 300 mg daily,    Hypertension continues amlodipine 10 mg daily, metoprolol  mg daily, lisinopril 40 mg daily    GI prophylaxis continues Florastor to 50 mg daily    BPH continue Flomax 0.4 nightly    Hyperlipidemia continues atorvastatin 20 mg daily    Vitamin D deficiency continues vitamin D 10,000 units daily    Vitamin B12 deficiency continues B12 1000 mcg daily    Depression, pain issues continue Cymbalta 60 mg daily    Type 2 diabetes continues Jardiance 10 mg daily, metformin 1000 mg twice a day    Obesity  Sunny Aquino MD

## 2024-06-23 ENCOUNTER — NURSING HOME (OUTPATIENT)
Dept: INTERNAL MEDICINE | Age: 74
End: 2024-06-23
Payer: MEDICARE

## 2024-06-23 DIAGNOSIS — M48.04 SPINAL STENOSIS OF THORACIC REGION: ICD-10-CM

## 2024-06-23 DIAGNOSIS — R22.2 LOCALIZED SWELLING OF BACK: Primary | ICD-10-CM

## 2024-06-23 NOTE — PROGRESS NOTES
"Nursing Home Follow-Up Note      Sunny Aquino MD  [x]  [address], [address]  [place] [address]  Phone: [phone]  Fax: [phone]     PATIENT NAME: Jonas Mercado                                                                          YOB: 1950            DATE OF SERVICE: 06/23/2024  FACILITY:   [] [facility]   [x] [facility]  [] [facility]  [] Other      HISTORY OF PRESENT ILLNESS: Patient is being seen due to concerns about a place on his back from his previous surgery at the top of the incision site that was protruding, it was not through the skin there was no redness pain was not the issue it was just the concern of the amount of swelling noted by nursing staff and nurse practitioner, I discussed this with the patient he says he does not really feel any different he says he has trouble getting around and moving around in the bed due to the pain in his back and his extreme surgery that he just had at [facility] [place], he is pleasant talkative alert, he can move about turning side-to-side in the bed on his own      PAST MEDICAL & SURGICAL HISTORY:   Past Medical History:   Diagnosis Date    Arthritis     Back pain     Bilateral ankle pain     NEUROPATHY    Corns and callus     Coronary artery disease     \"STIFF HEART\" ON BB, NO CARDIOLOGIST, PCP (-)CP OR SOA    Diabetes mellitus type 2, controlled     BG RUNS IN 'S IN AM    Hammertoe     Heel pain     Hyperlipemia     Hypertension     Left foot pain     Lymphedema     NO PROBLEMS LATELY    Numbness in feet     Pulmonary embolism 2013    SADDLE PE, RESOLVED      Past Surgical History:   Procedure Laterality Date    BACK SURGERY  11/2021    S1-L1 FUSION, CYST REMOVED (TOTAL OF 3 SURGERY 2016 TO 2021)    CARPAL TUNNEL RELEASE Right     COLONOSCOPY  2014    CYST REMOVAL      BACK    FOOT SURGERY Right     ACHELLIS TENDON/HAMMER TOE    " HYDROCELECTOMY Left 5/12/2023    Procedure: HYDROCELECTOMY, left;  Surgeon: Nicky Robertson MD;  Location: [facility] OR;  Service: Urology;  Laterality: Left;    KNEE ARTHROSCOPY Right 2005    DIAGNOSTIC    TEMPORAL ARTERY BIOPSY Right 01/19/2022    Procedure: Right temporal artery biopsy;  Surgeon: Wilbert Jacob MD;  Location: [facility] OR;  Service: Vascular;  Laterality: Right;          MEDICATIONS:  I have reviewed and reconciled the patients medication list in the patients chart at the [facility] nursing [facility] today.        PHYSICAL EXAMINATION:   VITAL SIGNS: 140/60 sat 97% on room air pulse 71 respiratory rate 18 temperature 97.0 blood sugar 151, 130, 198,    PHYSICAL EXAM: On exam he is awake alert pleasant cooperative no distress,   His abdomen is obese soft his lower legs show dry skin trace to 1+ edema he is got some bruising on his wrist and forearms bilaterally he is alert pleasant weak appearing his lungs are clear laterally and anteriorly cardiac exam reveals a regular rhythm soft heart tones, his back incision site is all closed there is no redness there is some swelling or protrusion of the what appears to be hardware at the top of the incision site mainly to the right of the spinous process area, it is not tender to palpation is not fluctuant, there is no redness and no discrepancy between the top and bottom of the incision site, no drainage      RECORDS REVIEW:   Orders Reviewed.  Labs Reviewed.      ICD-10-CM ICD-9-CM   1. Localized swelling of back  R22.2 782.2   2. Spinal stenosis of thoracic region  M48.04 724.01       ASSESSMENT/PLAN    Diagnoses and all orders for this visit:    1. Localized swelling of back (Primary)    2. Spinal stenosis of thoracic region    Localized swelling of the proximal incision site of the back at the top, questionable just hardware being palpated versus soft tissue mass fluid which I do not suspect, he does have an appointment with his neuro orthopedic  surgeons this coming week, we discussed getting to [place] is going to be difficult given his immobilization but he will have to go by ambulance or medical transport,, consider x-ray,    Spinal stenosis ----thoracic area ----status post surgery in [place], with low back pain mid back pain, continues Lyrica 150 mg twice a day, has as needed hydrocodone 7.5's every 6 hours as needed    Recent pneumonia was treated with antibiotics Augmentin    Congestive heart failure, continues Lasix 40 mg daily, Jardiance 10 mg daily    Gout, continues allopurinol 300 mg daily,    Hypertension continues amlodipine 5 mg daily, metoprolol  mg daily, lisinopril 40 mg daily    GI prophylaxis continues Florastor to 50 mg daily    BPH continue Flomax 0.4 nightly    Hyperlipidemia continues atorvastatin 20 mg daily    Vitamin D deficiency continues vitamin D 10,000 units daily    Vitamin B12 deficiency continues B12 1000 mcg daily    Depression, pain issues continue Cymbalta 60 mg daily    Type 2 diabetes continues Jardiance 10 mg daily, metformin 1000 mg twice a day    Obesity    Sunny Aquino MD

## 2024-07-09 ENCOUNTER — TRANSCRIBE ORDERS (OUTPATIENT)
Dept: ADMINISTRATIVE | Facility: HOSPITAL | Age: 74
End: 2024-07-09
Payer: MEDICARE

## 2024-07-09 DIAGNOSIS — J18.9 UNRESOLVED PNEUMONIA: Primary | ICD-10-CM

## 2024-07-15 ENCOUNTER — HOSPITAL ENCOUNTER (OUTPATIENT)
Dept: CT IMAGING | Facility: HOSPITAL | Age: 74
Discharge: HOME | End: 2024-07-15
Admitting: INTERNAL MEDICINE
Payer: MEDICARE

## 2024-07-15 ENCOUNTER — TELEPHONE (OUTPATIENT)
Dept: INTERNAL MEDICINE | Age: 74
End: 2024-07-15
Payer: MEDICARE

## 2024-07-15 DIAGNOSIS — J18.9 UNRESOLVED PNEUMONIA: ICD-10-CM

## 2024-07-15 PROCEDURE — 71250 CT THORAX DX C-: CPT

## 2024-07-15 NOTE — TELEPHONE ENCOUNTER
I spoke with nurse and nursing home regarding test results are going to print off and put onto the chart at the nursing home

## 2024-07-23 ENCOUNTER — NURSING HOME (OUTPATIENT)
Dept: INTERNAL MEDICINE | Age: 74
End: 2024-07-23
Payer: MEDICARE

## 2024-07-23 DIAGNOSIS — M43.17 SPONDYLOLISTHESIS AT L5-S1 LEVEL: ICD-10-CM

## 2024-07-23 DIAGNOSIS — I10 PRIMARY HYPERTENSION: ICD-10-CM

## 2024-07-23 DIAGNOSIS — M48.04 SPINAL STENOSIS OF THORACIC REGION: Primary | ICD-10-CM

## 2024-07-23 DIAGNOSIS — R60.0 LOCALIZED EDEMA: ICD-10-CM

## 2024-07-23 DIAGNOSIS — E78.2 MIXED HYPERLIPIDEMIA: ICD-10-CM

## 2024-07-23 DIAGNOSIS — E55.9 VITAMIN D DEFICIENCY: ICD-10-CM

## 2024-07-23 DIAGNOSIS — I50.32 CHRONIC DIASTOLIC (CONGESTIVE) HEART FAILURE: ICD-10-CM

## 2024-07-23 DIAGNOSIS — N40.0 BENIGN PROSTATIC HYPERPLASIA, UNSPECIFIED WHETHER LOWER URINARY TRACT SYMPTOMS PRESENT: ICD-10-CM

## 2024-07-23 DIAGNOSIS — E11.9 TYPE 2 DIABETES MELLITUS WITHOUT COMPLICATION, UNSPECIFIED WHETHER LONG TERM INSULIN USE: ICD-10-CM

## 2024-07-23 PROCEDURE — 99309 SBSQ NF CARE MODERATE MDM 30: CPT | Performed by: INTERNAL MEDICINE

## 2024-07-23 NOTE — PROGRESS NOTES
"Nursing Home Follow-Up Note      Sunny Aquino MD  [x]  [address], [address]  [place] [address]  Phone: [phone]  Fax: [phone]     PATIENT NAME: Jonas Mercado                                                                          YOB: 1950            DATE OF SERVICE: 07/23/2024  FACILITY:   [] [facility]   [x] [facility]  [] [facility]  [] Other      HISTORY OF PRESENT ILLNESS: Patient was seen today he is pleasant he is wearing his BiPAP machine he is in no distress, he says he is doing fairly well, he had a recent CT scan July 15 that showed resolving inflammatory changes in the lung, with no other acute process,      PAST MEDICAL & SURGICAL HISTORY:   Past Medical History:   Diagnosis Date    Arthritis     Back pain     Bilateral ankle pain     NEUROPATHY    Corns and callus     Coronary artery disease     \"STIFF HEART\" ON BB, NO CARDIOLOGIST, PCP (-)CP OR SOA    Diabetes mellitus type 2, controlled     BG RUNS IN 'S IN AM    Hammertoe     Heel pain     Hyperlipemia     Hypertension     Left foot pain     Lymphedema     NO PROBLEMS LATELY    Numbness in feet     Pulmonary embolism 2013    SADDLE PE, RESOLVED      Past Surgical History:   Procedure Laterality Date    BACK SURGERY  11/2021    S1-L1 FUSION, CYST REMOVED (TOTAL OF 3 SURGERY 2016 TO 2021)    CARPAL TUNNEL RELEASE Right     COLONOSCOPY  2014    CYST REMOVAL      BACK    FOOT SURGERY Right     ACHELLIS TENDON/HAMMER TOE    HYDROCELECTOMY Left 5/12/2023    Procedure: HYDROCELECTOMY, left;  Surgeon: Nicky Robertson MD;  Location: [facility] MAIN OR;  Service: Urology;  Laterality: Left;    KNEE ARTHROSCOPY Right 2005    DIAGNOSTIC    TEMPORAL ARTERY BIOPSY Right 01/19/2022    Procedure: Right temporal artery biopsy;  Surgeon: Wilbert Jacob MD;  Location: [facility] MAIN OR;  Service: Vascular;  Laterality: Right;      "     MEDICATIONS:  I have reviewed and reconciled the patients medication list in the patients chart at the [facility] nursing [facility] today.        PHYSICAL EXAMINATION:   VITAL SIGNS: 115/62 sat 96% on room air pulse 70 temperature 97.2 respiratory rate 18, blood sugar 152    PHYSICAL EXAM: On exam he is awake alert he is wearing his nasal CPAP machine his abdomen slightly obese soft nontender he moves his upper and lower extremities to command his lungs are clear laterally and anteriorly with diminished breath sounds cardiac exam regular rhythm, he is got some redness to the lower PreTAB regions anterior of his lower legs with 1+ to trace edema bilateral, he moves his lower legs to command he is got a small bandage over the right middle lower leg on the medial aspect, the areas of redness are not really warm, and he has good movement of his feet he is pleasant cooperates with exam      RECORDS REVIEW:   Orders Reviewed.  Labs Reviewed.      ICD-10-CM ICD-9-CM   1. Spinal stenosis of thoracic region  M48.04 724.01   2. Localized edema  R60.0 782.3   3. Type 2 diabetes mellitus without complication, unspecified whether long term insulin use  E11.9 250.00   4. Vitamin D deficiency  E55.9 268.9   5. Mixed hyperlipidemia  E78.2 272.2   6. Primary hypertension  I10 401.9   7. Benign prostatic hyperplasia, unspecified whether lower urinary tract symptoms present  N40.0 600.00   8. Spondylolisthesis at L5-S1 level  M43.17 756.12   9. Chronic diastolic (congestive) heart failure  I50.32 428.32     428.0       ASSESSMENT/PLAN    Diagnoses and all orders for this visit:    1. Spinal stenosis of thoracic region (Primary)    2. Localized edema    3. Type 2 diabetes mellitus without complication, unspecified whether long term insulin use    4. Vitamin D deficiency    5. Mixed hyperlipidemia    6. Primary hypertension    7. Benign prostatic hyperplasia, unspecified whether lower urinary tract symptoms present    8.  Spondylolisthesis at L5-S1 level    9. Chronic diastolic (congestive) heart failure        Localized swelling of the proximal incision site of the back at the top, questionable just hardware being palpated versus soft tissue mass //fluid which I do not suspect, he was supposed to follow-up with his orthopedic surgeon since I saw him last    Spinal stenosis ----thoracic area ----status post spinal surgery in [place], with low back pain mid back pain, continues Lyrica 150 mg twice a day,  as needed hydrocodone 7.5's every 6 hours as needed    Recent pneumonia was treated with antibiotics Augmentin, resolved, July 23, 2024    Congestive heart failure, continues Lasix 20 mg daily, Jardiance 10 mg daily, lisinopril 10 mg daily    Gout, continues allopurinol 300 mg daily,    Hypertension continues amlodipine 5 mg daily, metoprolol  mg daily, lisinopril 40 mg daily    BPH continue Flomax 0.4 nightly    Hyperlipidemia continues atorvastatin 20 mg daily    Vitamin D deficiency continues vitamin D 10,000 units daily    Vitamin B12 deficiency continues B12 1000 mcg daily    Depression, pain issues continue Cymbalta 60 mg daily    Type 2 diabetes continues Jardiance 10 mg daily, metformin 1000 mg twice a day    Obesity    Sunny Aquino MD

## 2024-08-16 ENCOUNTER — OFFICE VISIT (OUTPATIENT)
Dept: ORTHOPEDIC SURGERY | Facility: CLINIC | Age: 74
End: 2024-08-16
Payer: MEDICARE

## 2024-08-16 VITALS — SYSTOLIC BLOOD PRESSURE: 131 MMHG | HEART RATE: 62 BPM | DIASTOLIC BLOOD PRESSURE: 60 MMHG | OXYGEN SATURATION: 94 %

## 2024-08-16 DIAGNOSIS — M17.0 PRIMARY OSTEOARTHRITIS OF BOTH KNEES: Primary | ICD-10-CM

## 2024-08-16 RX ORDER — LIDOCAINE HYDROCHLORIDE 10 MG/ML
5 INJECTION, SOLUTION INFILTRATION; PERINEURAL
Status: COMPLETED | OUTPATIENT
Start: 2024-08-16 | End: 2024-08-16

## 2024-08-16 RX ORDER — FUROSEMIDE 20 MG/1
20 TABLET ORAL DAILY
COMMUNITY
Start: 2024-08-06

## 2024-08-16 RX ORDER — TRIAMCINOLONE ACETONIDE 40 MG/ML
40 INJECTION, SUSPENSION INTRA-ARTICULAR; INTRAMUSCULAR
Status: COMPLETED | OUTPATIENT
Start: 2024-08-16 | End: 2024-08-16

## 2024-08-16 RX ADMIN — LIDOCAINE HYDROCHLORIDE 5 ML: 10 INJECTION, SOLUTION INFILTRATION; PERINEURAL at 09:33

## 2024-08-16 RX ADMIN — TRIAMCINOLONE ACETONIDE 40 MG: 40 INJECTION, SUSPENSION INTRA-ARTICULAR; INTRAMUSCULAR at 09:33

## 2024-08-16 NOTE — PROGRESS NOTES
Chief Complaint  Follow-up of the Right Shoulder and Follow-up of the Right Knee    Subjective          Jonas Mercado presents to [facility] for   Follow-up      Jonas presents today for follow-up of his bilateral knees.  He has bilateral knee arthritis we are treating nonoperatively.  He is currently recovering from spinal fusion surgery.  He is interested in bilateral knee injections today.      Allergies   Allergen Reactions    Aspirin Anaphylaxis        Social History     Socioeconomic History    Marital status:    Tobacco Use    Smoking status: Former     Current packs/day: 0.00     Average packs/day: 0.5 packs/day for 4.1 years (2.0 ttl pk-yrs)     Types: Cigarettes     Start date:      Quit date: 1998     Years since quittin.5    Smokeless tobacco: Never   Vaping Use    Vaping status: Never Used   Substance and Sexual Activity    Alcohol use: Not Currently     Comment: VERY RARE    Drug use: Never    Sexual activity: Defer        I reviewed the patient's chief complaint, history of present illness, review of systems, past medical history, surgical history, family history, social history, medications, and allergy list.     REVIEW OF SYSTEMS    Constitutional: Denies fevers, chills, weight loss  Cardiovascular: Denies chest pain, shortness of breath  Skin: Denies rashes, acute skin changes  Neurologic: Denies headache, loss of consciousness  MSK: Bilateral knee      Objective   Vital Signs:   /60   Pulse 62   SpO2 94%     There is no height or weight on file to calculate BMI.    Physical Exam    General: Alert. No acute distress.   Bilateral lower extremities: Distal edema which is symmetric.  Mild effusions.  Lacks 5 degrees of extension.  Flexion to 110 degrees.  Crepitus with motion.  Stable to varus and valgus stress.  Muscle atrophy noted.    Large Joint Arthrocentesis  Date/Time: 2024 9:14 AM  Consent given by: patient  Site marked: site  marked  Timeout: Immediately prior to procedure a time out was called to verify the correct patient, procedure, equipment, support staff and site/side marked as required   Supporting Documentation  Indications: pain   Procedure Details  Location: -   Location: RIGHT KNEE.Needle gauge: 21 G.  Medications administered: 48 mg hylan 48 MG/6ML  Patient tolerance: patient tolerated the procedure well with no immediate complications      Large Joint Arthrocentesis  Date/Time: 8/16/2024 9:33 AM  Consent given by: patient  Site marked: site marked  Timeout: Immediately prior to procedure a time out was called to verify the correct patient, procedure, equipment, support staff and site/side marked as required   Supporting Documentation  Indications: pain   Procedure Details  Location: -   Location: LEFT KNEE.Needle gauge: 21 G.  Medications administered: 5 mL lidocaine 1 %; 40 mg triamcinolone acetonide 40 MG/ML  Patient tolerance: patient tolerated the procedure well with no immediate complications      This injection documentation was Scribed for Bennett Carson MD by Alisha Graham MA.  08/16/24   09:15 EDT    Imaging Results (Most Recent)       None                     Assessment and Plan        XR L Spine Ap & Lateral-Standing    Result Date: 7/24/2024  Narrative: This result has an attachment that is not available. AP    Impression: and lateral x-ray of the thoracolumbar spine shows pedicle screw instrumentation in good position from T10-S1.      Diagnoses and all orders for this visit:    1. Primary osteoarthritis of both knees (Primary)    Other orders  -     Large Joint Arthrocentesis  -     Large Joint Arthrocentesis        We discussed treatment options.  We discussed the risk, benefits, indications, alternatives to a right knee Synvisc injection and a left knee steroid injection.  He elected to proceed and tolerated the injections well.  He may continue his activity progression with PT for his spinal fusion  surgery.  He may call as needed going forward.        Call or return if worsening symptoms.    Scribed for Bennett Carson MD by Alisha Graham MA  08/16/2024   09:14 EDT         Follow Up       As needed    Patient was given instructions and counseling regarding his condition or for health maintenance advice. Please see specific information pulled into the AVS if appropriate.     I have personally performed the services described in this document as scribed by the above individual and it is both accurate and complete. Bennett Carson MD 08/16/24 11:06 EDT

## 2024-09-05 ENCOUNTER — OFFICE VISIT (OUTPATIENT)
Dept: PODIATRY | Facility: CLINIC | Age: 74
End: 2024-09-05
Payer: MEDICARE

## 2024-09-05 VITALS
WEIGHT: 265 LBS | HEART RATE: 68 BPM | BODY MASS INDEX: 36.45 KG/M2 | DIASTOLIC BLOOD PRESSURE: 63 MMHG | SYSTOLIC BLOOD PRESSURE: 119 MMHG | OXYGEN SATURATION: 98 %

## 2024-09-05 DIAGNOSIS — M79.671 FOOT PAIN, BILATERAL: Primary | ICD-10-CM

## 2024-09-05 DIAGNOSIS — M79.672 FOOT PAIN, BILATERAL: Primary | ICD-10-CM

## 2024-09-05 DIAGNOSIS — L60.0 ONYCHOCRYPTOSIS: ICD-10-CM

## 2024-09-05 DIAGNOSIS — R26.2 DIFFICULTY WALKING: ICD-10-CM

## 2024-09-05 DIAGNOSIS — E11.9 NON-INSULIN DEPENDENT TYPE 2 DIABETES MELLITUS: ICD-10-CM

## 2024-09-05 DIAGNOSIS — G62.9 NEUROPATHY: ICD-10-CM

## 2024-09-05 DIAGNOSIS — B35.1 ONYCHOMYCOSIS: ICD-10-CM

## 2024-09-05 NOTE — PROGRESS NOTES
"    [facility]    Today's Date: 09/05/24    Patient Name: Jonas Mercado  MRN: [id number]  CSN: [id number]  PCP: Tristan Phipps MD, Last PCP Visit: August 2024  Referring Provider: No ref. provider found    SUBJECTIVE     Chief Complaint   Patient presents with    Left Foot - Follow-up, Nail Problem    Right Foot - Follow-up, Nail Problem     HPI: Jonas Mercado, a 73 y.o.male, comes to clinic.    New, Established, New Problem:  established     Location:  Toenails    Duration:   Greater than five years    Onset:  Gradual    Nature:  sore with palpation.    Stable, worsening, improving:   Stable    Aggravating factors:  Pain with shoe gear and ambulation.    Previous Treatment:  debridement  __________________________________    NIDDM    Patient reported last blood glucose:  \"110\"  __________________________________    Patient reports the following medical changes since their last visit: Recent T3 spinal fusion    Patient denies any fevers, chills, nausea, vomiting, shortness of breathe, nor any other constitutional signs nor symptoms.       Past Medical History:   Diagnosis Date    Arthritis     Back pain     Bilateral ankle pain     NEUROPATHY    Corns and callus     Coronary artery disease     \"STIFF HEART\" ON BB, NO CARDIOLOGIST, PCP (-)CP OR SOA    Diabetes mellitus type 2, controlled     BG RUNS IN 'S IN AM    Hammertoe     Heel pain     Hyperlipemia     Hypertension     Left foot pain     Lymphedema     NO PROBLEMS LATELY    Numbness in feet     Pulmonary embolism 2013    SADDLE PE, RESOLVED     Past Surgical History:   Procedure Laterality Date    BACK SURGERY  11/2021    S1-L1 FUSION, CYST REMOVED (TOTAL OF 3 SURGERY 2016 TO 2021)    CARPAL TUNNEL RELEASE Right     COLONOSCOPY  2014    CYST REMOVAL      BACK    FOOT SURGERY Right     ACHELLIS TENDON/HAMMER TOE    HYDROCELECTOMY Left 5/12/2023    Procedure: HYDROCELECTOMY, left;  Surgeon: Nicky Robertson MD;  Location: " [facility] MAIN OR;  Service: Urology;  Laterality: Left;    KNEE ARTHROSCOPY Right     DIAGNOSTIC    TEMPORAL ARTERY BIOPSY Right 2022    Procedure: Right temporal artery biopsy;  Surgeon: Wilbert Jacob MD;  Location: [facility] MAIN OR;  Service: Vascular;  Laterality: Right;     Family History   Problem Relation Age of Onset    Stroke Father     Heart disease Brother     Desirae Hyperthermia Neg Hx      Social History     Socioeconomic History    Marital status:    Tobacco Use    Smoking status: Former     Current packs/day: 0.00     Average packs/day: 0.5 packs/day for 4.1 years (2.0 ttl pk-yrs)     Types: Cigarettes     Start date:      Quit date: 1998     Years since quittin.6    Smokeless tobacco: Never   Vaping Use    Vaping status: Never Used   Substance and Sexual Activity    Alcohol use: Not Currently     Comment: VERY RARE    Drug use: Never    Sexual activity: Defer     Allergies   Allergen Reactions    Aspirin Anaphylaxis     Current Outpatient Medications   Medication Sig Dispense Refill    albuterol sulfate  (90 Base) MCG/ACT inhaler 1 puff Every 6 (Six) Hours As Needed.      allopurinol (ZYLOPRIM) 300 MG tablet Take 1 tablet by mouth Daily. 90 tablet 0    amLODIPine (NORVASC) 5 MG tablet TAKE 1 TABLET DAILY 90 tablet 3    amoxicillin-clavulanate (AUGMENTIN) 875-125 MG per tablet Take 1 tablet by mouth 2 (Two) Times a Day.      atorvastatin (LIPITOR) 20 MG tablet Take 1 tablet by mouth Daily. 90 tablet 0    Diclofenac Sodium (VOLTAREN) 1 % gel gel Apply 4 g topically to the appropriate area as directed 4 (Four) Times a Day As Needed.      DULoxetine (CYMBALTA) 60 MG capsule Take 1 capsule by mouth Daily.      furosemide (LASIX) 20 MG tablet Take 1 tablet by mouth Daily.      Jardiance 10 MG tablet tablet Take 1 tablet by mouth Daily.      lisinopril (PRINIVIL,ZESTRIL) 40 MG tablet Take 1 tablet by mouth Daily. (Patient taking differently: Take 0.5 tablets by mouth Daily.)  90 tablet 0    metFORMIN (GLUCOPHAGE) 500 MG tablet Take 2 tablets by mouth 2 (Two) Times a Day With Meals. INSTRUCTED PER ANESTHESIA STANDING ORDERS      metoprolol succinate XL (TOPROL-XL) 200 MG 24 hr tablet Take 1 tablet by mouth Daily. 90 tablet 0    Ozempic, 2 MG/DOSE, 8 MG/3ML solution pen-injector Inject 2 mg under the skin into the appropriate area as directed.      pregabalin (LYRICA) 150 MG capsule Take 1 capsule by mouth 2 (Two) Times a Day. 180 capsule 0    tamsulosin (FLOMAX) 0.4 MG capsule 24 hr capsule Take 1 capsule by mouth Daily.      traMADol (ULTRAM) 50 MG tablet Take 1 tablet by mouth.       No current facility-administered medications for this visit.     Review of Systems   Constitutional: Negative.    Skin:         Toenails 1 through 5 bilaterally   Neurological:  Positive for numbness.   All other systems reviewed and are negative.      OBJECTIVE     Vitals:    09/05/24 1317   BP: 119/63   Pulse: 68   SpO2: 98%         Lab Results   Component Value Date    HGBA1C 7.0 (H) 06/07/2024       Lab Results   Component Value Date    GLUCOSE 266 (H) 01/19/2022    CALCIUM 8.9 01/19/2022     06/07/2024    K 3.8 06/07/2024    CO2 25.4 01/19/2022    CL 98 01/19/2022    BUN 22 01/19/2022    CREATININE 1.05 01/19/2022    EGFRIFAFRI >60 10/21/2021    EGFRIFNONA 70 01/19/2022    BCR 21.0 01/19/2022    ANIONGAP 12.6 01/19/2022       Patient seen in no apparent distress.      PHYSICAL EXAM:     Foot/Ankle Exam    GENERAL  Appearance:  obese and elderly  Orientation:  AAOx3  Affect:  appropriate  Gait:  antalgic  Assistance:  cane use  Right shoe gear: casual shoe  Left shoe gear: casual shoe    VASCULAR     Right Foot Vascularity   Dorsalis pedis:  1+  Posterior tibial:  1+  Skin temperature:  cool  Edema grading:  Pitting and 2+  CFT:  3  Pedal hair growth:  Absent  Varicosities:  mild varicosities     Left Foot Vascularity   Dorsalis pedis:  1+  Posterior tibial:  1+  Skin temperature:  cool  Edema  gradin+ and pitting  CFT:  3  Pedal hair growth:  Absent  Varicosities:  mild varicosities     NEUROLOGIC     Right Foot Neurologic   Light touch sensation: diminished  Vibratory sensation: diminished  Hot/Cold sensation: diminished  Protective Sensation using [place]-Cheryl Monofilament:   Sites intact: 2  Sites tested: 10     Left Foot Neurologic   Light touch sensation: diminished  Vibratory sensation: diminished  Hot/Cold sensation:  diminished  Protective Sensation using [place]-Cheryl Monofilament:   Sites intact: 2  Sites tested: 10    MUSCLE STRENGTH     Right Foot Muscle Strength   Foot dorsiflexion:  4  Foot plantar flexion:  4  Foot inversion:  4  Foot eversion:  4     Left Foot Muscle Strength   Foot dorsiflexion:  4  Foot plantar flexion:  4  Foot inversion:  4  Foot eversion:  4    DERMATOLOGIC      Right Foot Dermatologic   Skin  Right foot skin is intact.   Nails  1.  Positive for elongated, onychomycosis, abnormal thickness, subungual debris and ingrown toenail.  2.  Positive for elongated, onychomycosis, abnormal thickness, subungual debris and ingrown toenail.  3.  Positive for elongated, onychomycosis, abnormal thickness, subungual debris and ingrown toenail.  4.  Positive for elongated, onychomycosis, abnormal thickness, subungual debris and ingrown toenail.  5.  Positive for elongated, onychomycosis, abnormal thickness, subungual debris and ingrown toenail.     Left Foot Dermatologic   Skin  Left foot skin is intact.   Nails  1.  Positive for elongated, onychomycosis, abnormal thickness, subungual debris and ingrown toenail.  2.  Positive for elongated, onychomycosis, abnormal thickness, subungual debris and ingrown toenail.  3.  Positive for elongated, onychomycosis, abnormal thickness, subungual debris and ingrown toenail.  4.  Positive for elongated, onychomycosis, abnormally thick, subungual debris and ingrown toenail.  5.  Positive for elongated, onychomycosis, abnormally thick,  subungual debris and ingrown toenail.    ASSESSMENT/PLAN     Diagnoses and all orders for this visit:    1. Foot pain, bilateral (Primary)    2. Non-insulin dependent type 2 diabetes mellitus    3. Neuropathy    4. Onychocryptosis    5. Onychomycosis    6. Difficulty walking    Comprehensive lower extremity examination and evaluation was performed.    Discussed findings and treatment plan including risks, benefits, and treatment options with patient in detail. Patient agreed with treatment plan.    Toenails 1 through 5 bilaterally were debrided in thickness and length and then smoothed with a Dremel Tool.  Tolerated the procedure well without complications.    An After Visit Summary was printed and given to the patient at discharge, including (if requested) any available informative/educational handouts regarding diagnosis, treatment, or medications. All questions were answered to patient/family satisfaction. Should symptoms fail to improve or worsen they agree to call or return to clinic or to go to the Emergency Department. Discussed the importance of following up with any needed screening tests/labs/specialist appointments and any requested follow-up recommended by me today. Importance of maintaining follow-up discussed and patient accepts that missed appointments can delay diagnosis and potentially lead to worsening of conditions.    Return in about 9 weeks (around 11/7/2024) for Toenail Care., or sooner if acute issues arise.    This document has been electronically signed by Toby Gonzalez DPM on September 5, 2024 13:38 EDT

## 2024-10-23 ENCOUNTER — OFFICE VISIT (OUTPATIENT)
Dept: UROLOGY | Facility: CLINIC | Age: 74
End: 2024-10-23
Payer: MEDICARE

## 2024-10-23 VITALS
HEART RATE: 68 BPM | DIASTOLIC BLOOD PRESSURE: 60 MMHG | HEIGHT: 71 IN | WEIGHT: 265 LBS | OXYGEN SATURATION: 97 % | SYSTOLIC BLOOD PRESSURE: 124 MMHG | BODY MASS INDEX: 37.1 KG/M2

## 2024-10-23 DIAGNOSIS — N40.0 BENIGN PROSTATIC HYPERPLASIA WITHOUT LOWER URINARY TRACT SYMPTOMS: ICD-10-CM

## 2024-10-23 DIAGNOSIS — R32 URINARY INCONTINENCE, UNSPECIFIED TYPE: Primary | ICD-10-CM

## 2024-10-23 PROBLEM — Z79.899 HIGH RISK MEDICATION USE: Status: RESOLVED | Noted: 2021-09-15 | Resolved: 2024-10-23

## 2024-10-23 PROBLEM — M62.81 GENERALIZED MUSCLE WEAKNESS: Status: ACTIVE | Noted: 2021-11-04

## 2024-10-23 PROBLEM — G47.33 OSA (OBSTRUCTIVE SLEEP APNEA): Status: ACTIVE | Noted: 2024-06-10

## 2024-10-23 PROBLEM — E66.9 OBESITY: Status: ACTIVE | Noted: 2024-06-10

## 2024-10-23 PROBLEM — N43.3 HYDROCELE: Status: RESOLVED | Noted: 2023-03-31 | Resolved: 2024-10-23

## 2024-10-23 PROBLEM — M20.40 HAMMERTOE: Status: RESOLVED | Noted: 2021-09-15 | Resolved: 2024-10-23

## 2024-10-23 PROBLEM — K59.00 CONSTIPATION, UNSPECIFIED: Status: ACTIVE | Noted: 2021-11-04

## 2024-10-23 PROBLEM — R51.9 HEADACHE: Status: RESOLVED | Noted: 2022-01-27 | Resolved: 2024-10-23

## 2024-10-23 PROBLEM — J18.9 PNEUMONIA DUE TO INFECTIOUS AGENT: Status: ACTIVE | Noted: 2024-06-03

## 2024-10-23 PROBLEM — R26.2 DIFFICULTY IN WALKING, NOT ELSEWHERE CLASSIFIED: Status: ACTIVE | Noted: 2021-11-04

## 2024-10-23 LAB
BILIRUB BLD-MCNC: NEGATIVE MG/DL
CLARITY, POC: ABNORMAL
COLOR UR: YELLOW
EXPIRATION DATE: ABNORMAL
GLUCOSE UR STRIP-MCNC: ABNORMAL MG/DL
KETONES UR QL: NEGATIVE
LEUKOCYTE EST, POC: ABNORMAL
Lab: ABNORMAL
NITRITE UR-MCNC: POSITIVE MG/ML
PH UR: 5.5 [PH] (ref 5–8)
PROT UR STRIP-MCNC: ABNORMAL MG/DL
RBC # UR STRIP: ABNORMAL /UL
SP GR UR: 1.01 (ref 1–1.03)
URINE VOLUME: 21
UROBILINOGEN UR QL: ABNORMAL

## 2024-10-23 RX ORDER — LIDOCAINE 50 MG/G
1 PATCH TOPICAL EVERY 24 HOURS
COMMUNITY
Start: 2024-09-09

## 2024-10-23 RX ORDER — TOLTERODINE TARTRATE 2 MG/1
2 TABLET, EXTENDED RELEASE ORAL 2 TIMES DAILY
COMMUNITY
Start: 2024-09-04 | End: 2024-10-23

## 2024-10-23 RX ORDER — TAMSULOSIN HYDROCHLORIDE 0.4 MG/1
2 CAPSULE ORAL DAILY
Qty: 180 CAPSULE | Refills: 3 | Status: SHIPPED | OUTPATIENT
Start: 2024-10-23

## 2024-10-23 NOTE — PROGRESS NOTES
"Chief Complaint: Follow-up and Urinary Incontinence    Subjective         History of Present Illness  Jonas Mercado is a 73 y.o. male presents to [facility] to be seen for urinary incontinence.    Patient previously seen by Dr. Luna in 2023 for postop follow-up after left hydrocelectomy in May 2023.    Apparently the patient called last month requesting a follow-up appointment for urinary incontinence however the patient has never been seen for urinary incontinence through this office.    It does appear that the patient is on tamsulosin 0.4 mg daily looks as if this was prescribed by his PCP.    He saw his PCP in August for a follow-up appointment apparently at that point in time mention urge incontinence at night when sleeping and per that note was given a trial of Detrol.    He was given 2 mg of tolterodine 2 times a day starting 9/4/2024.     He had POSTERIOR SPINE LAMINECTOMY T11-12 and L1-2; FUSION WITH INSTRUMENTATION T10-L3, ALLOGRAFT, BMP in 6/2024.     He states issues with incontinence started after this.     He states that he is wearing 3 depends a day now.     He state incontinence is mostly with urgency.     He has to void once an hour.    He also has nocturia enuresis as well.    His urine has a foul odor and his wife states this is \"thick\"    He states the detrol is helping with urgency at night only.    He states his stream is good at times but weak at times as well.    He states that he has issues retracting foreskin.     Urine today is consistent with infection     Objective     Past Medical History:   Diagnosis Date    Arthritis     Back pain     Bilateral ankle pain     NEUROPATHY    Corns and callus     Coronary artery disease     \"STIFF HEART\" ON BB, NO CARDIOLOGIST, PCP (-)CP OR SOA    Diabetes mellitus type 2, controlled     BG RUNS IN 'S IN AM    Hammertoe     Heel pain     Hyperlipemia     Hypertension     Left foot pain     Lymphedema     NO " PROBLEMS LATELY    Numbness in feet     Pulmonary embolism 2013    SADDLE PE, RESOLVED       Past Surgical History:   Procedure Laterality Date    BACK SURGERY  11/2021    S1-L1 FUSION, CYST REMOVED (TOTAL OF 3 SURGERY 2016 TO 2021)    CARPAL TUNNEL RELEASE Right     COLONOSCOPY  2014    CYST REMOVAL      BACK    FOOT SURGERY Right     ACHELLIS TENDON/HAMMER TOE    HYDROCELECTOMY Left 5/12/2023    Procedure: HYDROCELECTOMY, left;  Surgeon: Nicky Robertson MD;  Location: [facility] MAIN OR;  Service: Urology;  Laterality: Left;    KNEE ARTHROSCOPY Right 2005    DIAGNOSTIC    TEMPORAL ARTERY BIOPSY Right 01/19/2022    Procedure: Right temporal artery biopsy;  Surgeon: Wilbert Jacob MD;  Location: [facility] MAIN OR;  Service: Vascular;  Laterality: Right;         Current Outpatient Medications:     albuterol sulfate  (90 Base) MCG/ACT inhaler, 1 puff Every 6 (Six) Hours As Needed., Disp: , Rfl:     allopurinol (ZYLOPRIM) 300 MG tablet, Take 1 tablet by mouth Daily., Disp: 90 tablet, Rfl: 0    amLODIPine (NORVASC) 5 MG tablet, TAKE 1 TABLET DAILY, Disp: 90 tablet, Rfl: 3    atorvastatin (LIPITOR) 20 MG tablet, Take 1 tablet by mouth Daily., Disp: 90 tablet, Rfl: 0    Diclofenac Sodium (VOLTAREN) 1 % gel gel, Apply 4 g topically to the appropriate area as directed 4 (Four) Times a Day As Needed., Disp: , Rfl:     DULoxetine (CYMBALTA) 60 MG capsule, Take 1 capsule by mouth Daily., Disp: , Rfl:     furosemide (LASIX) 20 MG tablet, Take 1 tablet by mouth Daily., Disp: , Rfl:     Jardiance 10 MG tablet tablet, Take 1 tablet by mouth Daily., Disp: , Rfl:     lidocaine (LIDODERM) 5 %, Place 1 patch on the skin as directed by provider Daily., Disp: , Rfl:     lisinopril (PRINIVIL,ZESTRIL) 40 MG tablet, Take 1 tablet by mouth Daily. (Patient taking differently: Take 0.5 tablets by mouth Daily. 10 mg per pt.), Disp: 90 tablet, Rfl: 0    metFORMIN (GLUCOPHAGE) 500 MG tablet, Take 2 tablets by mouth 2 (Two) Times a Day With  "Meals. INSTRUCTED PER ANESTHESIA STANDING ORDERS, Disp: , Rfl:     metoprolol succinate XL (TOPROL-XL) 200 MG 24 hr tablet, Take 1 tablet by mouth Daily., Disp: 90 tablet, Rfl: 0    Ozempic, 2 MG/DOSE, 8 MG/3ML solution pen-injector, Inject 2 mg under the skin into the appropriate area as directed., Disp: , Rfl:     pregabalin (LYRICA) 150 MG capsule, Take 1 capsule by mouth 2 (Two) Times a Day., Disp: 180 capsule, Rfl: 0    tamsulosin (FLOMAX) 0.4 MG capsule 24 hr capsule, Take 2 capsules by mouth Daily., Disp: 180 capsule, Rfl: 3    traMADol (ULTRAM) 50 MG tablet, Take 1 tablet by mouth., Disp: , Rfl:     Vibegron 75 MG tablet, Take 1 tablet by mouth Daily., Disp: 90 tablet, Rfl: 3    Allergies   Allergen Reactions    Aspirin Anaphylaxis        Family History   Problem Relation Age of Onset    Stroke Father     Heart disease Brother     Malig Hyperthermia Neg Hx        Social History     Socioeconomic History    Marital status:    Tobacco Use    Smoking status: Former     Current packs/day: 0.00     Average packs/day: 0.5 packs/day for 4.1 years (2.0 ttl pk-yrs)     Types: Cigarettes     Start date:      Quit date: 1998     Years since quittin.7    Smokeless tobacco: Never   Vaping Use    Vaping status: Never Used   Substance and Sexual Activity    Alcohol use: Not Currently     Comment: VERY RARE    Drug use: Never    Sexual activity: Defer       Vital Signs:   /60 (BP Location: Right arm, Patient Position: Sitting, Cuff Size: Large Adult)   Pulse 68   Ht 181.6 cm (71.5\")   Wt 120 kg (265 lb)   SpO2 97%   BMI 36.45 kg/m²      Physical Exam     Result Review :   The following data was reviewed by: YU Telles on 10/23/2024:  Results for orders placed or performed in visit on 10/23/24   Bladder Scan    Collection Time: 10/23/24  1:54 PM   Result Value Ref Range    Urine Volume 21    POC Urinalysis Dipstick, Automated    Collection Time: 10/23/24  1:58 PM    Specimen: Urine "   Result Value Ref Range    Color Yellow Yellow, Straw, Dark Yellow, Sabrina    Clarity, UA Slightly Cloudy (A) Clear    Specific Gravity  1.015 1.005 - 1.030    pH, Urine 5.5 5.0 - 8.0    Leukocytes Small (1+) (A) Negative    Nitrite, UA Positive (A) Negative    Protein, POC 30 mg/dL (A) Negative mg/dL    Glucose, UA >=1000 mg/dL (3+) (A) Negative mg/dL    Ketones, UA Negative Negative    Urobilinogen, UA 0.2 E.U./dL Normal, 0.2 E.U./dL    Bilirubin Negative Negative    Blood, UA Trace (A) Negative    Lot Number [id number]     Expiration Date 8,312,025         Bladder Scan interpretation 10/23/2024    Estimation of residual urine via eReplacementsI 3000 VerLOVEThESIGN Bladder Scan  MA/nurse performing: vance hayes Ma   Residual Urine: 21 ml  Indication: Urinary incontinence, unspecified type    Benign prostatic hyperplasia without lower urinary tract symptoms   Position: Supine  Examination: Incremental scanning of the suprapubic area using 2.0 MHz transducer using copious amounts of acoustic gel.   Findings: An anechoic area was demonstrated which represented the bladder, with measurement of residual urine as noted. I inspected this myself. In that the residual urine was stable or insignificant, refer to plan for treatment and plan necessary at this time.          Procedures        Assessment and Plan    Diagnoses and all orders for this visit:    1. Urinary incontinence, unspecified type (Primary)  -     POC Urinalysis Dipstick, Automated  -     Bladder Scan  -     Pathnostics Guidance UTI -; Future  -     Vibegron 75 MG tablet; Take 1 tablet by mouth Daily.  Dispense: 90 tablet; Refill: 3    2. Benign prostatic hyperplasia without lower urinary tract symptoms  -     tamsulosin (FLOMAX) 0.4 MG capsule 24 hr capsule; Take 2 capsules by mouth Daily.  Dispense: 180 capsule; Refill: 3      We will increase tamsulosin dose to see if this will help him empty better.     We will send urine for PCR cx today     We will d/c tolterodine will try  him on gemtesa. Gemtesa selected rather than anticholinergic medication as anticholinergic medications have been shown to result in acute impairment of working memory, attention, and psychomotor speed.  Additionally recent evidence suggests that there long-term use may be associated with global cognitive impairment.  As such I will not use anticholinergics in this patient as I believe the risks outweigh the benefits.  DOI: 10.  1001/J AMA intern med.[id number]     Discussed at length that one of the limitations to obtaining gemtesa is that it can be cost prohibitive.  Order sent to pharmacy-will attempt prior authorization if necessary to obtain insurance approval however, patient aware that despite our best efforts often times it remains too high of cost for patients to continue.        I spent 20 minutes caring for Jonas on this date of service. This time includes time spent by me in the following activities:reviewing tests, obtaining and/or reviewing a separately obtained history, performing a medically appropriate examination and/or evaluation , counseling and educating the patient/family/caregiver, ordering medications, tests, or procedures, and documenting information in the medical record  Follow Up   Return in about 6 weeks (around 12/4/2024) for f/u incontinence with PVR .  Patient was given instructions and counseling regarding his condition or for health maintenance advice. Please see specific information pulled into the AVS if appropriate.         This document has been electronically signed by YU Telles  October 23, 2024 14:28 EDT

## 2024-10-25 ENCOUNTER — DOCUMENTATION (OUTPATIENT)
Dept: UROLOGY | Age: 74
End: 2024-10-25
Payer: MEDICARE

## 2024-10-25 DIAGNOSIS — R32 URINARY INCONTINENCE, UNSPECIFIED TYPE: Primary | ICD-10-CM

## 2024-11-22 ENCOUNTER — OFFICE VISIT (OUTPATIENT)
Dept: ORTHOPEDIC SURGERY | Facility: CLINIC | Age: 74
End: 2024-11-22
Payer: MEDICARE

## 2024-11-22 VITALS — SYSTOLIC BLOOD PRESSURE: 154 MMHG | OXYGEN SATURATION: 90 % | DIASTOLIC BLOOD PRESSURE: 84 MMHG | HEART RATE: 94 BPM

## 2024-11-22 DIAGNOSIS — M17.12 ARTHRITIS OF LEFT KNEE: ICD-10-CM

## 2024-11-22 DIAGNOSIS — M17.0 PRIMARY OSTEOARTHRITIS OF BOTH KNEES: Primary | ICD-10-CM

## 2024-11-22 RX ORDER — LISINOPRIL 10 MG/1
TABLET ORAL
COMMUNITY
Start: 2024-11-07

## 2024-11-22 RX ORDER — SEMAGLUTIDE 2.68 MG/ML
2 INJECTION, SOLUTION SUBCUTANEOUS
COMMUNITY
Start: 2024-11-14

## 2024-11-22 RX ADMIN — LIDOCAINE HYDROCHLORIDE 5 ML: 10 INJECTION, SOLUTION INFILTRATION; PERINEURAL at 10:18

## 2024-11-22 RX ADMIN — TRIAMCINOLONE ACETONIDE 40 MG: 40 INJECTION, SUSPENSION INTRA-ARTICULAR; INTRAMUSCULAR at 10:18

## 2024-11-22 NOTE — PROGRESS NOTES
Chief Complaint  Follow-up of the Left Knee    Subjective          Jonas Mercado presents to [facility]   History of Present Illness    Jonas Mercado presents today for a follow-up of his right knee.  Patient has right knee arthritis that were treated conservatively.  Today, patient presents for a left knee steroid injection.  He denies any new injuries to his knee.      Allergies   Allergen Reactions    Aspirin Anaphylaxis        Social History     Socioeconomic History    Marital status:    Tobacco Use    Smoking status: Former     Current packs/day: 0.00     Average packs/day: 0.5 packs/day for 4.1 years (2.0 ttl pk-yrs)     Types: Cigarettes     Start date:      Quit date: 1998     Years since quittin.8    Smokeless tobacco: Never   Vaping Use    Vaping status: Never Used   Substance and Sexual Activity    Alcohol use: Not Currently     Comment: VERY RARE    Drug use: Never    Sexual activity: Defer        I reviewed the patient's chief complaint, history of present illness, review of systems, past medical history, surgical history, family history, social history, medications, and allergy list.     REVIEW OF SYSTEMS    Constitutional: Denies fevers, chills, weight loss  Cardiovascular: Denies chest pain, shortness of breath  Skin: Denies rashes, acute skin changes  Neurologic: Denies headache, loss of consciousness  MSK: Left knee pain      Objective   Vital Signs:   /84   Pulse 94   SpO2 90%     There is no height or weight on file to calculate BMI.    Physical Exam    General: Alert. No acute distress.   Left lower extremity: Phyto-K shy full extension.  Knee flexion 110.  Knee extensor mechanism intact.  Knee stable to varus and valgus stress.  Patellar crepitus with motion.  Calf soft, nontender.  Lower extremity edema bilaterally.  Erythema noted to the left lower extremity, no concerning signs for infection.  Demonstrates active ankle  flexion dorsiflexion.  Palpable pedal pulses.    Large Joint Arthrocentesis  Date/Time: 11/22/2024 10:18 AM  Consent given by: patient  Site marked: site marked  Timeout: Immediately prior to procedure a time out was called to verify the correct patient, procedure, equipment, support staff and site/side marked as required   Supporting Documentation  Indications: pain   Procedure Details  Location: -   Location: LEFT KNEE.Needle gauge: 21 G.  Medications administered: 5 mL lidocaine 1 %; 40 mg triamcinolone acetonide 40 MG/ML  Patient tolerance: patient tolerated the procedure well with no immediate complications      This injection documentation was Scribed for Elo Wood PA-C by Alisha Graham MA.  11/22/24   10:18 EST    Imaging Results (Most Recent)       None                   Assessment and Plan    Diagnoses and all orders for this visit:    1. Primary osteoarthritis of both knees (Primary)    2. Arthritis of left knee    Other orders  -     Large Joint Arthrocentesis        Jonas Mercado presents today to follow-up with his left knee arthritis.  He is instructed to continue with home exercises. We discussed the risks and benefits with the patient regarding left knee steroid injection. Patient understood and elected to proceed. Patient tolerated injection well with no complications.         Patient will follow up in 3 months for reevaluation.  We will obtain new x-rays of the bilateral knees at next visit.      Call or return if symptoms worsen or patient has any concerns.       Follow Up   Return in about 3 months (around 2/22/2025).  Patient was given instructions and counseling regarding his condition or for health maintenance advice. Please see specific information pulled into the AVS if appropriate.     Elo Wood PA-C  11/25/24  07:51 EST

## 2024-11-25 RX ORDER — LIDOCAINE HYDROCHLORIDE 10 MG/ML
5 INJECTION, SOLUTION INFILTRATION; PERINEURAL
Status: COMPLETED | OUTPATIENT
Start: 2024-11-22 | End: 2024-11-22

## 2024-11-25 RX ORDER — TRIAMCINOLONE ACETONIDE 40 MG/ML
40 INJECTION, SUSPENSION INTRA-ARTICULAR; INTRAMUSCULAR
Status: COMPLETED | OUTPATIENT
Start: 2024-11-22 | End: 2024-11-22

## 2024-12-04 ENCOUNTER — OFFICE VISIT (OUTPATIENT)
Dept: UROLOGY | Facility: CLINIC | Age: 74
End: 2024-12-04
Payer: MEDICARE

## 2024-12-04 VITALS
OXYGEN SATURATION: 96 % | SYSTOLIC BLOOD PRESSURE: 120 MMHG | BODY MASS INDEX: 36.7 KG/M2 | HEART RATE: 86 BPM | DIASTOLIC BLOOD PRESSURE: 66 MMHG | WEIGHT: 271 LBS | HEIGHT: 72 IN

## 2024-12-04 DIAGNOSIS — R32 URINARY INCONTINENCE, UNSPECIFIED TYPE: Primary | ICD-10-CM

## 2024-12-04 LAB
BILIRUB BLD-MCNC: NEGATIVE MG/DL
CLARITY, POC: CLEAR
COLOR UR: YELLOW
EXPIRATION DATE: ABNORMAL
GLUCOSE UR STRIP-MCNC: ABNORMAL MG/DL
KETONES UR QL: NEGATIVE
LEUKOCYTE EST, POC: NEGATIVE
Lab: ABNORMAL
NITRITE UR-MCNC: NEGATIVE MG/ML
PH UR: 5.5 [PH] (ref 5–8)
PROT UR STRIP-MCNC: ABNORMAL MG/DL
RBC # UR STRIP: NEGATIVE /UL
SP GR UR: 1.01 (ref 1–1.03)
URINE VOLUME: 0
UROBILINOGEN UR QL: ABNORMAL

## 2024-12-04 RX ORDER — VIBEGRON 75 MG/1
75 TABLET, FILM COATED ORAL DAILY
Qty: 56 TABLET | Refills: 0 | COMMUNITY
Start: 2024-12-04 | End: 2025-01-29

## 2024-12-04 NOTE — PROGRESS NOTES
"Chief Complaint: Follow-up (Patient presents today for a follow up on her BPH, and urinary incontinence. He states that the vibegron helped but he is unable to afford it. He denies any urinary symptoms. )    Subjective         History of Present Illness  Jonas Mercado is a 73 y.o. male presents to [facility] to be seen for urinary incontinence.    At last visit we started him on gemtesa and treated him for a UTI.    He has no further foul odor to the urine.     He state he is not getting up at night to void.     He is having very minimal leakage at night    He states he does have some urgency at times.     He is wearing 3-4 depends a day.         Previous:     Patient previously seen by Dr. Luna in 2023 for postop follow-up after left hydrocelectomy in May 2023.    Apparently the patient called last month requesting a follow-up appointment for urinary incontinence however the patient has never been seen for urinary incontinence through this office.    It does appear that the patient is on tamsulosin 0.4 mg daily looks as if this was prescribed by his PCP.    He saw his PCP in August for a follow-up appointment apparently at that point in time mention urge incontinence at night when sleeping and per that note was given a trial of Detrol.    He was given 2 mg of tolterodine 2 times a day starting 9/4/2024.     He had POSTERIOR SPINE LAMINECTOMY T11-12 and L1-2; FUSION WITH INSTRUMENTATION T10-L3, ALLOGRAFT, BMP in 6/2024.     He states issues with incontinence started after this.     He states that he is wearing 3 depends a day now.     He state incontinence is mostly with urgency.     He has to void once an hour.    He also has nocturia enuresis as well.    His urine has a foul odor and his wife states this is \"thick\"    He states the detrol is helping with urgency at night only.    He states his stream is good at times but weak at times as well.    He states that he has issues " "retracting foreskin.     Urine today is consistent with infection     Objective     Past Medical History:   Diagnosis Date    Arthritis     Back pain     Bilateral ankle pain     NEUROPATHY    Corns and callus     Coronary artery disease     \"STIFF HEART\" ON BB, NO CARDIOLOGIST, PCP (-)CP OR SOA    Diabetes mellitus type 2, controlled     BG RUNS IN 'S IN AM    Hammertoe     Heel pain     Hyperlipemia     Hypertension     Left foot pain     Lymphedema     NO PROBLEMS LATELY    Numbness in feet     Pulmonary embolism 2013    SADDLE PE, RESOLVED       Past Surgical History:   Procedure Laterality Date    BACK SURGERY  11/2021    S1-L1 FUSION, CYST REMOVED (TOTAL OF 3 SURGERY 2016 TO 2021)    CARPAL TUNNEL RELEASE Right     COLONOSCOPY  2014    CYST REMOVAL      BACK    FOOT SURGERY Right     ACHELLIS TENDON/HAMMER TOE    HYDROCELECTOMY Left 5/12/2023    Procedure: HYDROCELECTOMY, left;  Surgeon: Nicky Robertson MD;  Location: [facility] MAIN OR;  Service: Urology;  Laterality: Left;    KNEE ARTHROSCOPY Right 2005    DIAGNOSTIC    TEMPORAL ARTERY BIOPSY Right 01/19/2022    Procedure: Right temporal artery biopsy;  Surgeon: Wilbert Jacob MD;  Location: [facility] MAIN OR;  Service: Vascular;  Laterality: Right;         Current Outpatient Medications:     albuterol sulfate  (90 Base) MCG/ACT inhaler, 1 puff Every 6 (Six) Hours As Needed., Disp: , Rfl:     allopurinol (ZYLOPRIM) 300 MG tablet, Take 1 tablet by mouth Daily., Disp: 90 tablet, Rfl: 0    amLODIPine (NORVASC) 5 MG tablet, TAKE 1 TABLET DAILY, Disp: 90 tablet, Rfl: 3    amoxicillin-clavulanate (AUGMENTIN) 875-125 MG per tablet, Take 1 tablet by mouth 2 (Two) Times a Day., Disp: 10 tablet, Rfl: 0    atorvastatin (LIPITOR) 20 MG tablet, Take 1 tablet by mouth Daily., Disp: 90 tablet, Rfl: 0    Diclofenac Sodium (VOLTAREN) 1 % gel gel, Apply 4 g topically to the appropriate area as directed 4 (Four) Times a Day As Needed., Disp: , Rfl:     DULoxetine " (CYMBALTA) 60 MG capsule, Take 1 capsule by mouth Daily., Disp: , Rfl:     furosemide (LASIX) 20 MG tablet, Take 1 tablet by mouth Daily., Disp: , Rfl:     Jardiance 10 MG tablet tablet, Take 1 tablet by mouth Daily., Disp: , Rfl:     lidocaine (LIDODERM) 5 %, Place 1 patch on the skin as directed by provider Daily., Disp: , Rfl:     lisinopril (PRINIVIL,ZESTRIL) 10 MG tablet, , Disp: , Rfl:     metFORMIN (GLUCOPHAGE) 500 MG tablet, Take 2 tablets by mouth 2 (Two) Times a Day With Meals. INSTRUCTED PER ANESTHESIA STANDING ORDERS, Disp: , Rfl:     metoprolol succinate XL (TOPROL-XL) 200 MG 24 hr tablet, Take 1 tablet by mouth Daily., Disp: 90 tablet, Rfl: 0    pregabalin (LYRICA) 150 MG capsule, Take 1 capsule by mouth 2 (Two) Times a Day., Disp: 180 capsule, Rfl: 0    Semaglutide, 2 MG/DOSE, (Ozempic, 2 MG/DOSE,) 8 MG/3ML solution pen-injector, Inject 2 mg under the skin into the appropriate area as directed., Disp: , Rfl:     tamsulosin (FLOMAX) 0.4 MG capsule 24 hr capsule, Take 2 capsules by mouth Daily., Disp: 180 capsule, Rfl: 3    traMADol (ULTRAM) 50 MG tablet, Take 1 tablet by mouth., Disp: , Rfl:     Vibegron 75 MG tablet, Take 1 tablet by mouth Daily., Disp: 90 tablet, Rfl: 3    Vibegron 75 MG tablet, Take 1 tablet by mouth Daily., Disp: 42 tablet, Rfl: 0    Allergies   Allergen Reactions    Aspirin Anaphylaxis        Family History   Problem Relation Age of Onset    Stroke Father     Heart disease Brother     Malig Hyperthermia Neg Hx        Social History     Socioeconomic History    Marital status:    Tobacco Use    Smoking status: Former     Current packs/day: 0.00     Average packs/day: 0.5 packs/day for 4.1 years (2.0 ttl pk-yrs)     Types: Cigarettes     Start date:      Quit date: 1998     Years since quittin.8    Smokeless tobacco: Never   Vaping Use    Vaping status: Never Used   Substance and Sexual Activity    Alcohol use: Not Currently     Comment: VERY RARE    Drug use:  "Never    Sexual activity: Defer       Vital Signs:   /66 (BP Location: Left arm, Patient Position: Sitting, Cuff Size: Large Adult)   Pulse 86   Ht 181.6 cm (71.5\")   Wt 123 kg (271 lb)   SpO2 96%   BMI 37.27 kg/m²      Physical Exam     Result Review :   The following data was reviewed by: YU Telles on 12/04/2024:  Results for orders placed or performed in visit on 12/04/24   Bladder Scan    Collection Time: 12/04/24  2:54 PM   Result Value Ref Range    Urine Volume 0    POC Urinalysis Dipstick, Automated    Collection Time: 12/04/24  2:55 PM    Specimen: Urine   Result Value Ref Range    Color Yellow Yellow, Straw, Dark Yellow, Sabrina    Clarity, UA Clear Clear    Specific Gravity  1.010 1.005 - 1.030    pH, Urine 5.5 5.0 - 8.0    Leukocytes Negative Negative    Nitrite, UA Negative Negative    Protein, POC 30 mg/dL (A) Negative mg/dL    Glucose, UA >=1000 mg/dL (3+) (A) Negative mg/dL    Ketones, UA Negative Negative    Urobilinogen, UA 0.2 E.U./dL Normal, 0.2 E.U./dL    Bilirubin Negative Negative    Blood, UA Negative Negative    Lot Number [id number]     Expiration Date 9/2,025         Bladder Scan interpretation 12/04/2024    Estimation of residual urine via BVI 3000 Verathon Bladder Scan  MA/nurse performing: irma german ma    Residual Urine: 0 ml  Indication: Urinary incontinence, unspecified type   Position: Supine  Examination: Incremental scanning of the suprapubic area using 2.0 MHz transducer using copious amounts of acoustic gel.   Findings: An anechoic area was demonstrated which represented the bladder, with measurement of residual urine as noted. I inspected this myself. In that the residual urine was stable or insignificant, refer to plan for treatment and plan necessary at this time.          Procedures        Assessment and Plan    Diagnoses and all orders for this visit:    1. Urinary incontinence, unspecified type (Primary)  -     POC Urinalysis Dipstick, Automated  -     " Bladder Scan        We will continue to sample him with gemtesa as this is helping.     His spine surgeon did believe that this may be a side effect of the surgery and this could improve.     Did discuss with the patient options moving forward.    Patient would like to try PTNS starting in January just to see if this will help to alleviate some of the urgency frequency and some of his leakage.    We will plan for a follow-up appointment with him in about 3 months to see how his PTNS is doing.        I spent 10 minutes caring for Jonas on this date of service. This time includes time spent by me in the following activities:reviewing tests, obtaining and/or reviewing a separately obtained history, performing a medically appropriate examination and/or evaluation , counseling and educating the patient/family/caregiver, ordering medications, tests, or procedures, and documenting information in the medical record  Follow Up   Return in about 3 months (around 3/4/2025) for f/u oab/ ptns .  Patient was given instructions and counseling regarding his condition or for health maintenance advice. Please see specific information pulled into the AVS if appropriate.         This document has been electronically signed by YU Telles  December 4, 2024 15:25 EST

## 2025-01-22 ENCOUNTER — OFFICE VISIT (OUTPATIENT)
Dept: UROLOGY | Facility: CLINIC | Age: 75
End: 2025-01-22
Payer: MEDICARE

## 2025-01-22 VITALS — WEIGHT: 271 LBS | HEIGHT: 71 IN | BODY MASS INDEX: 37.94 KG/M2 | RESPIRATION RATE: 18 BRPM

## 2025-01-22 DIAGNOSIS — R32 URINARY INCONTINENCE, UNSPECIFIED TYPE: Primary | ICD-10-CM

## 2025-01-22 NOTE — PROGRESS NOTES
"Chief Complaint  PTNS #1    Subjective        Jonas Mercado presents to [facility]  History of Present Illness    Objective   Vital Signs:  Resp 18   Ht 181.5 cm (71.46\")   Wt 123 kg (271 lb)   BMI 37.31 kg/m²   Estimated body mass index is 37.31 kg/m² as calculated from the following:    Height as of this encounter: 181.5 cm (71.46\").    Weight as of this encounter: 123 kg (271 lb).            Physical Exam   Result Review :         Percutaneous Nerve Evaluation    Date/Time: 1/22/2025 1:49 PM    Performed by: May Galindo RN  Authorized by: Anya Cordon APRN  Preparation: Patient was prepped and draped in the usual sterile fashion.  Local anesthesia used: no    Anesthesia:  Local anesthesia used: no    Sedation:  Patient sedated: no    Patient tolerance: patient tolerated the procedure well with no immediate complications  Comments: Percutaneous Tibial Nerve Stimulation #7.  The patient was properly identified in position for the procedure sitting comfortably in a chair with the right leg and foot exposed.  The NURO device was prepared.  Therapy session kit opened and the needle espitia and ground pad attached to the device.  The ground pad placement site was determined and cleaned and dried.  The adhesive pad was removed from the ground pad and the device was placed on the patient within reach of the needle insertion site.     An alcohol pad was used to clean and dry the needle insertion site area.  The needle insertion site was located on the inner aspect of the right leg and approximately 3 finger breadths up from the medial malleolus and approximately 1 fingerbreadth posterior from the tibia.  The sterile needle package was opened the needle/guide to assembled and positioned over the identified and cleaned insertion site of the 60 degree angle between the shaft of the needle and skin.  The needle tip pointed cephalad.     The stop plug was removed to release the " needle.  The needle head was gently tapped on to devine the skin.  Once the needle pierced the skin, the guide tube was removed.  The needle was advanced using a rotating motion to facilitate entry until approximately half of the needle was inserted into the leg.  The plunger on the needle espitia was depressed to expose the J hook.  The J hook was looped around the needle, close to the skin without pinching the skin.     The NURO device was turned on and the power button was pressed again to advance the stimulation level adjustment screen.  The stimulation was increased until the patient elicited a motor and sensory response.  The sensory response was tingling across the bottom of the foot/heel.  The motor response was toe flexion.  Once the appropriate responses were observed, therapy was started by pressing the start button.  The patient received 30-minute therapy session.     Once therapy session was complete.  The device was turned off by holding down the power button.  The needle espitia was detached from the needle.  The needle was removed from the leg using a smooth fluid motion and the neuro device and ground pad removed from the patient.  Patient tolerated procedure well.      This procedure was done under my direct supervision. I was in the immediate area for the entire portion of the procedure.          Assessment and Plan   There are no diagnoses linked to this encounter.         Follow Up   No follow-ups on file.  Patient was given instructions and counseling regarding his condition or for health maintenance advice. Please see specific information pulled into the AVS if appropriate.

## 2025-01-29 ENCOUNTER — OFFICE VISIT (OUTPATIENT)
Dept: UROLOGY | Facility: CLINIC | Age: 75
End: 2025-01-29
Payer: MEDICARE

## 2025-01-29 VITALS — RESPIRATION RATE: 18 BRPM | BODY MASS INDEX: 37.94 KG/M2 | WEIGHT: 271 LBS | HEIGHT: 71 IN

## 2025-01-29 DIAGNOSIS — R32 URINARY INCONTINENCE, UNSPECIFIED TYPE: Primary | ICD-10-CM

## 2025-01-29 NOTE — PROGRESS NOTES
"Chief Complaint  PTNS #2    Subjective        Jonas Mercado presents to [facility]  History of Present Illness    Objective   Vital Signs:  Resp 18   Ht 181.5 cm (71.46\")   Wt 123 kg (271 lb)   BMI 37.31 kg/m²   Estimated body mass index is 37.31 kg/m² as calculated from the following:    Height as of this encounter: 181.5 cm (71.46\").    Weight as of this encounter: 123 kg (271 lb).            Physical Exam   Result Review :         Percutaneous Nerve Evaluation    Date/Time: 1/29/2025 2:00 PM    Performed by: May Galindo RN  Authorized by: Anya Cordon APRN  Preparation: Patient was prepped and draped in the usual sterile fashion.  Local anesthesia used: no    Anesthesia:  Local anesthesia used: no    Sedation:  Patient sedated: no    Patient tolerance: patient tolerated the procedure well with no immediate complications  Comments: Percutaneous Tibial Nerve Stimulation #8.  The patient was properly identified in position for the procedure sitting comfortably in a chair with the right leg and foot exposed.  The NURO device was prepared.  Therapy session kit opened and the needle espitia and ground pad attached to the device.  The ground pad placement site was determined and cleaned and dried.  The adhesive pad was removed from the ground pad and the device was placed on the patient within reach of the needle insertion site.     An alcohol pad was used to clean and dry the needle insertion site area.  The needle insertion site was located on the inner aspect of the right leg and approximately 3 finger breadths up from the medial malleolus and approximately 1 fingerbreadth posterior from the tibia.  The sterile needle package was opened the needle/guide to assembled and positioned over the identified and cleaned insertion site of the 60 degree angle between the shaft of the needle and skin.  The needle tip pointed cephalad.     The stop plug was removed to release the " needle.  The needle head was gently tapped on to devine the skin.  Once the needle pierced the skin, the guide tube was removed.  The needle was advanced using a rotating motion to facilitate entry until approximately half of the needle was inserted into the leg.  The plunger on the needle espitia was depressed to expose the J hook.  The J hook was looped around the needle, close to the skin without pinching the skin.     The NURO device was turned on and the power button was pressed again to advance the stimulation level adjustment screen.  The stimulation was increased until the patient elicited a motor and sensory response.  The sensory response was tingling across the bottom of the foot/heel.  The motor response was toe flexion.  Once the appropriate responses were observed, therapy was started by pressing the start button.  The patient received 30-minute therapy session.     Once therapy session was complete.  The device was turned off by holding down the power button.  The needle espitia was detached from the needle.  The needle was removed from the leg using a smooth fluid motion and the neuro device and ground pad removed from the patient.  Patient tolerated procedure well.      This procedure was done under my direct supervision. I was in the immediate area for the entire portion of the procedure.          Assessment and Plan   There are no diagnoses linked to this encounter.         Follow Up   No follow-ups on file.  Patient was given instructions and counseling regarding his condition or for health maintenance advice. Please see specific information pulled into the AVS if appropriate.

## 2025-02-05 ENCOUNTER — OFFICE VISIT (OUTPATIENT)
Dept: UROLOGY | Facility: CLINIC | Age: 75
End: 2025-02-05
Payer: MEDICARE

## 2025-02-05 VITALS — HEIGHT: 71 IN | BODY MASS INDEX: 37.94 KG/M2 | RESPIRATION RATE: 19 BRPM | WEIGHT: 271 LBS

## 2025-02-05 DIAGNOSIS — R32 URINARY INCONTINENCE, UNSPECIFIED TYPE: Primary | ICD-10-CM

## 2025-02-05 NOTE — PROGRESS NOTES
"Chief Complaint  PTNS #3    Subjective        Jonas Mercado presents to [facility]  History of Present Illness    Objective   Vital Signs:  Resp 19   Ht 181.5 cm (71.46\")   Wt 123 kg (271 lb)   BMI 37.31 kg/m²   Estimated body mass index is 37.31 kg/m² as calculated from the following:    Height as of this encounter: 181.5 cm (71.46\").    Weight as of this encounter: 123 kg (271 lb).            Physical Exam   Result Review :         Percutaneous Nerve Evaluation    Date/Time: 2/5/2025 2:27 PM    Performed by: May Galidno RN  Authorized by: Anya Cordon APRN  Preparation: Patient was prepped and draped in the usual sterile fashion.  Local anesthesia used: no    Anesthesia:  Local anesthesia used: no    Sedation:  Patient sedated: no    Patient tolerance: patient tolerated the procedure well with no immediate complications  Comments: Percutaneous Tibial Nerve Stimulation #3.  The patient was properly identified in position for the procedure sitting comfortably in a chair with the right leg and foot exposed.  The NURO device was prepared.  Therapy session kit opened and the needle espitia and ground pad attached to the device.  The ground pad placement site was determined and cleaned and dried.  The adhesive pad was removed from the ground pad and the device was placed on the patient within reach of the needle insertion site.     An alcohol pad was used to clean and dry the needle insertion site area.  The needle insertion site was located on the inner aspect of the right leg and approximately 3 finger breadths up from the medial malleolus and approximately 1 fingerbreadth posterior from the tibia.  The sterile needle package was opened the needle/guide to assembled and positioned over the identified and cleaned insertion site of the 60 degree angle between the shaft of the needle and skin.  The needle tip pointed cephalad.     The stop plug was removed to release the needle. "  The needle head was gently tapped on to devine the skin.  Once the needle pierced the skin, the guide tube was removed.  The needle was advanced using a rotating motion to facilitate entry until approximately half of the needle was inserted into the leg.  The plunger on the needle espitia was depressed to expose the J hook.  The J hook was looped around the needle, close to the skin without pinching the skin.     The NURO device was turned on and the power button was pressed again to advance the stimulation level adjustment screen.  The stimulation was increased until the patient elicited a motor and sensory response.  The sensory response was tingling across the bottom of the foot/heel.  The motor response was toe flexion.  Once the appropriate responses were observed, therapy was started by pressing the start button.  The patient received 30-minute therapy session.     Once therapy session was complete.  The device was turned off by holding down the power button.  The needle espitia was detached from the needle.  The needle was removed from the leg using a smooth fluid motion and the neuro device and ground pad removed from the patient.  Patient tolerated procedure well.      This procedure was done under my direct supervision. I was in the immediate area for the entire portion of the procedure.          Assessment and Plan   There are no diagnoses linked to this encounter.         Follow Up   No follow-ups on file.  Patient was given instructions and counseling regarding his condition or for health maintenance advice. Please see specific information pulled into the AVS if appropriate.

## 2025-02-12 ENCOUNTER — OFFICE VISIT (OUTPATIENT)
Dept: UROLOGY | Facility: CLINIC | Age: 75
End: 2025-02-12
Payer: MEDICARE

## 2025-02-12 VITALS — WEIGHT: 271 LBS | BODY MASS INDEX: 37.94 KG/M2 | RESPIRATION RATE: 18 BRPM | HEIGHT: 71 IN

## 2025-02-12 DIAGNOSIS — R32 URINARY INCONTINENCE, UNSPECIFIED TYPE: Primary | ICD-10-CM

## 2025-02-12 RX ORDER — POTASSIUM CHLORIDE 750 MG/1
10 TABLET, EXTENDED RELEASE ORAL DAILY
COMMUNITY
Start: 2025-02-12 | End: 2026-02-12

## 2025-02-12 RX ORDER — BUMETANIDE 1 MG/1
1 TABLET ORAL DAILY
COMMUNITY
Start: 2025-02-12 | End: 2026-02-12

## 2025-02-12 RX ORDER — SILVER SULFADIAZINE 10 MG/G
CREAM TOPICAL
COMMUNITY
Start: 2024-12-17

## 2025-02-12 NOTE — PROGRESS NOTES
"Chief Complaint  PTNS #4    Subjective        Jonas Mercado presents to [facility] UROLOG[facility]  History of Present Illness    Objective   Vital Signs:  Resp 18   Ht 181.5 cm (71.46\")   Wt 123 kg (271 lb)   BMI 37.31 kg/m²   Estimated body mass index is 37.31 kg/m² as calculated from the following:    Height as of this encounter: 181.5 cm (71.46\").    Weight as of this encounter: 123 kg (271 lb).            Physical Exam   Result Review :         Percutaneous Nerve Evaluation    Date/Time: 2/12/2025 1:37 PM    Performed by: May Galindo RN  Authorized by: Anya Cordon APRN  Preparation: Patient was prepped and draped in the usual sterile fashion.  Local anesthesia used: no    Anesthesia:  Local anesthesia used: no    Sedation:  Patient sedated: no    Patient tolerance: patient tolerated the procedure well with no immediate complications  Comments: Percutaneous Tibial Nerve Stimulation #3.  The patient was properly identified in position for the procedure sitting comfortably in a chair with the left leg and foot exposed.  The NURO device was prepared.  Therapy session kit opened and the needle espitia and ground pad attached to the device.  The ground pad placement site was determined and cleaned and dried.  The adhesive pad was removed from the ground pad and the device was placed on the patient within reach of the needle insertion site.     An alcohol pad was used to clean and dry the needle insertion site area.  The needle insertion site was located on the inner aspect of the left leg and approximately 3 finger breadths up from the medial malleolus and approximately 1 fingerbreadth posterior from the tibia.  The sterile needle package was opened the needle/guide to assembled and positioned over the identified and cleaned insertion site of the 60 degree angle between the shaft of the needle and skin.  The needle tip pointed cephalad.     The stop plug was removed to release the needle.  " The needle head was gently tapped on to devine the skin.  Once the needle pierced the skin, the guide tube was removed.  The needle was advanced using a rotating motion to facilitate entry until approximately half of the needle was inserted into the leg.  The plunger on the needle espitia was depressed to expose the J hook.  The J hook was looped around the needle, close to the skin without pinching the skin.     The NURO device was turned on and the power button was pressed again to advance the stimulation level adjustment screen.  The stimulation was increased until the patient elicited a motor and sensory response.  The sensory response was tingling across the bottom of the foot/heel.  The motor response was toe flexion.  Once the appropriate responses were observed, therapy was started by pressing the start button.  The patient received 30-minute therapy session.     Once therapy session was complete.  The device was turned off by holding down the power button.  The needle espitia was detached from the needle.  The needle was removed from the leg using a smooth fluid motion and the neuro device and ground pad removed from the patient.  Patient tolerated procedure well.      This procedure was done under my direct supervision. I was in the immediate area for the entire portion of the procedure.          Assessment and Plan   There are no diagnoses linked to this encounter.         Follow Up   No follow-ups on file.  Patient was given instructions and counseling regarding his condition or for health maintenance advice. Please see specific information pulled into the AVS if appropriate.

## 2025-02-19 ENCOUNTER — TELEPHONE (OUTPATIENT)
Dept: UROLOGY | Age: 75
End: 2025-02-19
Payer: MEDICARE

## 2025-02-19 NOTE — TELEPHONE ENCOUNTER
CALLED AND SPOKE W/ PATIENT REGARDING PTNS APPT 2/19 THAT NEEDS TO BE CX'D DUE TO WEATHER.    PATIENT IS AWARE THAT [facility] MEDICAL STAFF WILL REACH OUT TO HIM TO GET HIM R/S WHEN BACK IN THE OFFICE.

## 2025-02-24 ENCOUNTER — OFFICE VISIT (OUTPATIENT)
Dept: ORTHOPEDIC SURGERY | Facility: CLINIC | Age: 75
End: 2025-02-24
Payer: MEDICARE

## 2025-02-24 VITALS — HEART RATE: 88 BPM | DIASTOLIC BLOOD PRESSURE: 53 MMHG | OXYGEN SATURATION: 90 % | SYSTOLIC BLOOD PRESSURE: 87 MMHG

## 2025-02-24 DIAGNOSIS — M25.561 RIGHT KNEE PAIN, UNSPECIFIED CHRONICITY: ICD-10-CM

## 2025-02-24 DIAGNOSIS — M17.0 PRIMARY OSTEOARTHRITIS OF BOTH KNEES: Primary | ICD-10-CM

## 2025-02-24 DIAGNOSIS — M25.562 LEFT KNEE PAIN, UNSPECIFIED CHRONICITY: ICD-10-CM

## 2025-02-24 RX ORDER — TRIAMCINOLONE ACETONIDE 40 MG/ML
40 INJECTION, SUSPENSION INTRA-ARTICULAR; INTRAMUSCULAR
Status: COMPLETED | OUTPATIENT
Start: 2025-02-24 | End: 2025-02-24

## 2025-02-24 RX ORDER — LIDOCAINE HYDROCHLORIDE 10 MG/ML
5 INJECTION, SOLUTION INFILTRATION; PERINEURAL
Status: COMPLETED | OUTPATIENT
Start: 2025-02-24 | End: 2025-02-24

## 2025-02-24 RX ADMIN — LIDOCAINE HYDROCHLORIDE 5 ML: 10 INJECTION, SOLUTION INFILTRATION; PERINEURAL at 11:14

## 2025-02-24 RX ADMIN — LIDOCAINE HYDROCHLORIDE 5 ML: 10 INJECTION, SOLUTION INFILTRATION; PERINEURAL at 11:27

## 2025-02-24 RX ADMIN — TRIAMCINOLONE ACETONIDE 40 MG: 40 INJECTION, SUSPENSION INTRA-ARTICULAR; INTRAMUSCULAR at 11:27

## 2025-02-24 RX ADMIN — TRIAMCINOLONE ACETONIDE 40 MG: 40 INJECTION, SUSPENSION INTRA-ARTICULAR; INTRAMUSCULAR at 11:14

## 2025-02-24 NOTE — TELEPHONE ENCOUNTER
PATIENT CALLED AND SAID HE HAS PTNS ON WEDNESDAYS AT 1:45 WITH ISHAN.  IT WAS CANCELED LAST WEEK, DUE TO SNOW.  HE EXPECTED A CALL BACK TO RESCHEDULE.  HE WANTS TO MAKE SURE ONE IS SCHEDULED THIS WEEK ON WEDNESDAY AT 1:45.

## 2025-02-24 NOTE — PROGRESS NOTES
Chief Complaint  Follow-up of the Right Knee and Follow-up of the Left Knee    Subjective          Jonas Mercado presents to [facility]   History of Present Illness    Jonas Mercado presents today for a follow-up of his bilateral knees.  Patient has bilateral knee arthritis that we are treating conservatively.  Today, patient states that he is doing okay.  He reports pain and sensitivity to the medial aspect of his right knee.  He explains that following his last gel injection he experienced tightness to his right knee for quite a while but this has improved and his range of motion is improving.  He states that his left knee is doing okay with better range of motion.  He states that he recently started oxygen.  He states standing and walking aggravate his knees.      Allergies   Allergen Reactions    Aspirin Anaphylaxis        Social History     Socioeconomic History    Marital status:    Tobacco Use    Smoking status: Former     Current packs/day: 0.00     Average packs/day: 0.5 packs/day for 4.1 years (2.0 ttl pk-yrs)     Types: Cigarettes     Start date:      Quit date: 1998     Years since quittin.0    Smokeless tobacco: Never   Vaping Use    Vaping status: Never Used   Substance and Sexual Activity    Alcohol use: Not Currently     Comment: VERY RARE    Drug use: Never    Sexual activity: Defer        I reviewed the patient's chief complaint, history of present illness, review of systems, past medical history, surgical history, family history, social history, medications, and allergy list.     REVIEW OF SYSTEMS    Constitutional: Denies fevers, chills, weight loss  Cardiovascular: Denies chest pain, shortness of breath  Skin: Denies rashes, acute skin changes  Neurologic: Denies headache, loss of consciousness  MSK: Right knee pain.  Left knee pain.      Objective   Vital Signs:   BP (!) 87/53   Pulse 88   SpO2 90%     There is no height or weight on  file to calculate BMI.    Physical Exam    General: Alert. No acute distress.   Left lower extremity: 5 degrees shy full extension. Knee flexion 110. Knee extensor mechanism intact. Knee stable to varus and valgus stress. Patellar crepitus with motion. Calf soft, nontender. Lower extremity edema bilaterally. Demonstrates active ankle plantarflexion and dorsiflexion. Palpable pedal pulses.     Right lower extremity: Mild swelling to the knee.  10 degrees shy of full knee extension.  Knee flexion 100.  Knee extensor mechanism intact.  Knee stable to varus and valgus stress.  Patellar crepitus with motion.  Calf soft, nontender.  Lower extremity edema.  Demonstrates active ankle plantarflexion and dorsiflexion.  Palpable pedal pulses.      Large Joint Arthrocentesis  Date/Time: 2/24/2025 11:14 AM  Consent given by: patient  Site marked: site marked  Timeout: Immediately prior to procedure a time out was called to verify the correct patient, procedure, equipment, support staff and site/side marked as required   Supporting Documentation  Indications: pain   Procedure Details  Location: -   Location: left knee.Needle gauge: 21 G.  Medications administered: 5 mL lidocaine 1 %; 40 mg triamcinolone acetonide 40 MG/ML  Patient tolerance: patient tolerated the procedure well with no immediate complications      Large Joint Arthrocentesis  Date/Time: 2/24/2025 11:27 AM  Consent given by: patient  Site marked: site marked  Timeout: Immediately prior to procedure a time out was called to verify the correct patient, procedure, equipment, support staff and site/side marked as required   Supporting Documentation  Indications: pain   Procedure Details  Location: -   Location: right knee.Needle gauge: 21 G.  Medications administered: 5 mL lidocaine 1 %; 40 mg triamcinolone acetonide 40 MG/ML  Patient tolerance: patient tolerated the procedure well with no immediate complications      This injection documentation was Scribed for  Elo Wood PA-C by Alisha Graham MA.  02/24/25   11:15 EST    Imaging Results (Most Recent)       Procedure Component Value Units Date/Time    XR Knee 3 View Left [[id number]] Resulted: 02/24/25 1321     Updated: 02/24/25 1321    Narrative:      Indications: Follow-up left knee arthritis    Views: AP, PA flex, lateral, sunrise view left knee    Findings: No fractures.  Advanced tricompartmental arthritic changes are   seen with bone-on-bone loss of articular height to the medial compartment.    Comparative Data: No comparative data available.    XR Knee 3 View Right [[id number]] Resulted: 02/24/25 1321     Updated: 02/24/25 1321    Narrative:      Indications: Follow-up right knee pain    Views: AP, lateral, sunrise view right knee    Findings: No fractures.  Advanced tricompartmental arthritic changes are   seen. Loss of articular height to the medial and patellofemoral   compartments.  Varus alignment.    Comparative Data: Comparative data found and reviewed today.                   Assessment and Plan    Diagnoses and all orders for this visit:    1. Primary osteoarthritis of both knees (Primary)    2. Right knee pain, unspecified chronicity  -     XR Knee 3 View Right    3. Left knee pain, unspecified chronicity  -     XR Knee 3 View Left    Other orders  -     Large Joint Arthrocentesis  -     Large Joint Arthrocentesis        Jonas Mercado presents today to follow-up with his bilateral knee arthritis that we are treating conservatively.  X-rays were reviewed with the patient today.  Patient instructed to continue with knee range of motion exercises as well as strengthening exercises.  Use as elevation as needed for inflammation. We discussed the risks and benefits with the patient regarding right knee steroid and left knee steroid injections. Patient understood and elected to proceed. Patient tolerated injections well with no complications.     Patient will follow up in 3 months for reevaluation.         Call or return if symptoms worsen or patient has any concerns.       Follow Up   Return in about 3 months (around 5/24/2025).  Patient was given instructions and counseling regarding his condition or for health maintenance advice. Please see specific information pulled into the AVS if appropriate.     Elo Wood PA-C  02/24/25  14:05 EST

## 2025-02-26 ENCOUNTER — OFFICE VISIT (OUTPATIENT)
Dept: UROLOGY | Facility: CLINIC | Age: 75
End: 2025-02-26
Payer: MEDICARE

## 2025-02-26 VITALS — BODY MASS INDEX: 37.94 KG/M2 | RESPIRATION RATE: 18 BRPM | HEIGHT: 71 IN | WEIGHT: 271 LBS

## 2025-02-26 DIAGNOSIS — R32 URINARY INCONTINENCE, UNSPECIFIED TYPE: Primary | ICD-10-CM

## 2025-02-26 NOTE — PROGRESS NOTES
"Chief Complaint  PTNS #5    Subjective        Jonas Mercado presents to [facility]  History of Present Illness    Objective   Vital Signs:  Resp 18   Ht 181.5 cm (71.46\")   Wt 123 kg (271 lb)   BMI 37.31 kg/m²   Estimated body mass index is 37.31 kg/m² as calculated from the following:    Height as of this encounter: 181.5 cm (71.46\").    Weight as of this encounter: 123 kg (271 lb).            Physical Exam   Result Review :         Percutaneous Nerve Evaluation    Date/Time: 2/26/2025 1:50 PM    Performed by: May Galidno RN  Authorized by: Anya Cordon APRN  Preparation: Patient was prepped and draped in the usual sterile fashion.  Local anesthesia used: no    Anesthesia:  Local anesthesia used: no    Sedation:  Patient sedated: no    Patient tolerance: patient tolerated the procedure well with no immediate complications  Comments: Percutaneous Tibial Nerve Stimulation #9.  The patient was properly identified in position for the procedure sitting comfortably in a chair with the right leg and foot exposed.  The NURO device was prepared.  Therapy session kit opened and the needle espitia and ground pad attached to the device.  The ground pad placement site was determined and cleaned and dried.  The adhesive pad was removed from the ground pad and the device was placed on the patient within reach of the needle insertion site.     An alcohol pad was used to clean and dry the needle insertion site area.  The needle insertion site was located on the inner aspect of the right leg and approximately 3 finger breadths up from the medial malleolus and approximately 1 fingerbreadth posterior from the tibia.  The sterile needle package was opened the needle/guide to assembled and positioned over the identified and cleaned insertion site of the 60 degree angle between the shaft of the needle and skin.  The needle tip pointed cephalad.     The stop plug was removed to release the " needle.  The needle head was gently tapped on to devine the skin.  Once the needle pierced the skin, the guide tube was removed.  The needle was advanced using a rotating motion to facilitate entry until approximately half of the needle was inserted into the leg.  The plunger on the needle espitia was depressed to expose the J hook.  The J hook was looped around the needle, close to the skin without pinching the skin.     The NURO device was turned on and the power button was pressed again to advance the stimulation level adjustment screen.  The stimulation was increased until the patient elicited a motor and sensory response.  The sensory response was tingling across the bottom of the foot/heel.  The motor response was toe flexion.  Once the appropriate responses were observed, therapy was started by pressing the start button.  The patient received 30-minute therapy session.     Once therapy session was complete.  The device was turned off by holding down the power button.  The needle espitia was detached from the needle.  The needle was removed from the leg using a smooth fluid motion and the neuro device and ground pad removed from the patient.  Patient tolerated procedure well.      This procedure was done under my direct supervision. I was in the immediate area for the entire portion of the procedure.          Assessment and Plan   There are no diagnoses linked to this encounter.         Follow Up   No follow-ups on file.  Patient was given instructions and counseling regarding his condition or for health maintenance advice. Please see specific information pulled into the AVS if appropriate.

## 2025-03-05 ENCOUNTER — CLINICAL SUPPORT (OUTPATIENT)
Dept: UROLOGY | Facility: CLINIC | Age: 75
End: 2025-03-05
Payer: MEDICARE

## 2025-03-05 VITALS — HEIGHT: 71 IN | BODY MASS INDEX: 37.94 KG/M2 | RESPIRATION RATE: 18 BRPM | WEIGHT: 271 LBS

## 2025-03-05 DIAGNOSIS — R32 URINARY INCONTINENCE, UNSPECIFIED TYPE: Primary | ICD-10-CM

## 2025-03-05 NOTE — PROGRESS NOTES
"Chief Complaint  PTNS #6    Subjective        Jonas Mercado presents to [facility]  History of Present Illness    Objective   Vital Signs:  Resp 18   Ht 181.5 cm (71.46\")   Wt 123 kg (271 lb)   BMI 37.31 kg/m²   Estimated body mass index is 37.31 kg/m² as calculated from the following:    Height as of this encounter: 181.5 cm (71.46\").    Weight as of this encounter: 123 kg (271 lb).            Physical Exam   Result Review :         Percutaneous Nerve Evaluation    Date/Time: 3/5/2025 2:35 PM    Performed by: May Galindo RN  Authorized by: Anya Cordon APRN  Preparation: Patient was prepped and draped in the usual sterile fashion.  Local anesthesia used: no    Anesthesia:  Local anesthesia used: no    Sedation:  Patient sedated: no    Patient tolerance: patient tolerated the procedure well with no immediate complications  Comments: Percutaneous Tibial Nerve Stimulation #6.  The patient was properly identified in position for the procedure sitting comfortably in a chair with the right leg and foot exposed.  The NURO device was prepared.  Therapy session kit opened and the needle espitia and ground pad attached to the device.  The ground pad placement site was determined and cleaned and dried.  The adhesive pad was removed from the ground pad and the device was placed on the patient within reach of the needle insertion site.     An alcohol pad was used to clean and dry the needle insertion site area.  The needle insertion site was located on the inner aspect of the right leg and approximately 3 finger breadths up from the medial malleolus and approximately 1 fingerbreadth posterior from the tibia.  The sterile needle package was opened the needle/guide to assembled and positioned over the identified and cleaned insertion site of the 60 degree angle between the shaft of the needle and skin.  The needle tip pointed cephalad.     The stop plug was removed to release the needle. "  The needle head was gently tapped on to devine the skin.  Once the needle pierced the skin, the guide tube was removed.  The needle was advanced using a rotating motion to facilitate entry until approximately half of the needle was inserted into the leg.  The plunger on the needle espitia was depressed to expose the J hook.  The J hook was looped around the needle, close to the skin without pinching the skin.     The NURO device was turned on and the power button was pressed again to advance the stimulation level adjustment screen.  The stimulation was increased until the patient elicited a motor and sensory response.  The sensory response was tingling across the bottom of the foot/heel.  The motor response was toe flexion.  Once the appropriate responses were observed, therapy was started by pressing the start button.  The patient received 30-minute therapy session.     Once therapy session was complete.  The device was turned off by holding down the power button.  The needle espitia was detached from the needle.  The needle was removed from the leg using a smooth fluid motion and the neuro device and ground pad removed from the patient.  Patient tolerated procedure well.      This procedure was done under my direct supervision. I was in the immediate area for the entire portion of the procedure.          Assessment and Plan   There are no diagnoses linked to this encounter.         Follow Up   No follow-ups on file.  Patient was given instructions and counseling regarding his condition or for health maintenance advice. Please see specific information pulled into the AVS if appropriate.

## 2025-03-12 ENCOUNTER — OFFICE VISIT (OUTPATIENT)
Dept: UROLOGY | Facility: CLINIC | Age: 75
End: 2025-03-12
Payer: MEDICARE

## 2025-03-12 VITALS — BODY MASS INDEX: 36.12 KG/M2 | HEIGHT: 71 IN | WEIGHT: 258 LBS

## 2025-03-12 DIAGNOSIS — R32 URINARY INCONTINENCE, UNSPECIFIED TYPE: ICD-10-CM

## 2025-03-12 DIAGNOSIS — N40.0 BENIGN PROSTATIC HYPERPLASIA WITHOUT LOWER URINARY TRACT SYMPTOMS: Primary | ICD-10-CM

## 2025-03-12 PROBLEM — I50.23 ACUTE ON CHRONIC SYSTOLIC (CONGESTIVE) HEART FAILURE: Status: RESOLVED | Noted: 2024-06-15 | Resolved: 2025-03-12

## 2025-03-12 PROBLEM — M75.121 NONTRAUMATIC COMPLETE TEAR OF RIGHT ROTATOR CUFF: Status: RESOLVED | Noted: 2022-10-31 | Resolved: 2025-03-12

## 2025-03-12 PROBLEM — J18.9 PNEUMONIA DUE TO INFECTIOUS AGENT: Status: RESOLVED | Noted: 2024-06-03 | Resolved: 2025-03-12

## 2025-03-12 PROBLEM — E11.65 TYPE 2 DIABETES MELLITUS WITH HYPERGLYCEMIA, WITHOUT LONG-TERM CURRENT USE OF INSULIN: Status: RESOLVED | Noted: 2021-09-15 | Resolved: 2025-03-12

## 2025-03-12 PROBLEM — M43.17 SPONDYLOLISTHESIS AT L5-S1 LEVEL: Status: RESOLVED | Noted: 2020-02-25 | Resolved: 2025-03-12

## 2025-03-12 PROBLEM — I26.99 PULMONARY EMBOLISM: Status: RESOLVED | Noted: 2021-09-15 | Resolved: 2025-03-12

## 2025-03-12 PROBLEM — R60.0 LOCALIZED EDEMA: Status: RESOLVED | Noted: 2023-02-07 | Resolved: 2025-03-12

## 2025-03-12 PROBLEM — M10.9 GOUT: Status: RESOLVED | Noted: 2021-09-15 | Resolved: 2025-03-12

## 2025-03-12 PROBLEM — R22.2 LOCALIZED SWELLING OF BACK: Status: RESOLVED | Noted: 2024-06-23 | Resolved: 2025-03-12

## 2025-03-12 PROBLEM — E66.9 OBESITY: Status: RESOLVED | Noted: 2024-06-10 | Resolved: 2025-03-12

## 2025-03-12 LAB
BILIRUB BLD-MCNC: NEGATIVE MG/DL
CLARITY, POC: CLEAR
COLOR UR: YELLOW
EXPIRATION DATE: ABNORMAL
GLUCOSE UR STRIP-MCNC: ABNORMAL MG/DL
KETONES UR QL: NEGATIVE
LEUKOCYTE EST, POC: NEGATIVE
Lab: ABNORMAL
NITRITE UR-MCNC: NEGATIVE MG/ML
PH UR: 5 [PH] (ref 5–8)
PROT UR STRIP-MCNC: NEGATIVE MG/DL
RBC # UR STRIP: NEGATIVE /UL
SP GR UR: 1.01 (ref 1–1.03)
URINE VOLUME: 0
UROBILINOGEN UR QL: ABNORMAL

## 2025-03-12 NOTE — PROGRESS NOTES
Chief Complaint: Urinary Incontinence (Patient states that he is still having urinary frequency. He states that the Gemtesa is helping. )    Subjective         History of Present Illness  Jonas Mercado is a 74 y.o. male presents to [facility] to be seen for f/u  urinary incontinence.    At last visit we recommended continuation of Gemtesa as this was providing some benefit for him.    We also had discussed PTNS with the patient which he ultimately agreed to proceed with.    The patient has been undergoing treatment for OAB symptoms with PTNS.    He has had x 6 treatments so far. Today is treatment #7.    He was taken off of tamsulosin by cardiology for hypotension.    Nocturia x 0    He does void a lot during the day but does take bumex during the day.    He states he will void every 20-25 minutes in the morning and then will eventually get farther apart during the day.    He states that the urgency is better controlled and he can delay the urge to void since PTNS.         Previous:    At last visit we started him on gemtesa and treated him for a UTI.    He has no further foul odor to the urine.     He state he is not getting up at night to void.     He is having very minimal leakage at night    He states he does have some urgency at times.     He is wearing 3-4 depends a day.         Previous:     Patient previously seen by Dr. Luna in 2023 for postop follow-up after left hydrocelectomy in May 2023.    Apparently the patient called last month requesting a follow-up appointment for urinary incontinence however the patient has never been seen for urinary incontinence through this office.    It does appear that the patient is on tamsulosin 0.4 mg daily looks as if this was prescribed by his PCP.    He saw his PCP in August for a follow-up appointment apparently at that point in time mention urge incontinence at night when sleeping and per that note was given a trial of Detrol.    He  "was given 2 mg of tolterodine 2 times a day starting 9/4/2024.     He had POSTERIOR SPINE LAMINECTOMY T11-12 and L1-2; FUSION WITH INSTRUMENTATION T10-L3, ALLOGRAFT, BMP in 6/2024.     He states issues with incontinence started after this.     He states that he is wearing 3 depends a day now.     He state incontinence is mostly with urgency.     He has to void once an hour.    He also has nocturia enuresis as well.    His urine has a foul odor and his wife states this is \"thick\"    He states the detrol is helping with urgency at night only.    He states his stream is good at times but weak at times as well.    He states that he has issues retracting foreskin.     Urine today is consistent with infection     Objective     Past Medical History:   Diagnosis Date    Arthritis     Back pain     Bilateral ankle pain     NEUROPATHY    Corns and callus     Coronary artery disease     \"STIFF HEART\" ON BB, NO CARDIOLOGIST, PCP (-)CP OR SOA    Diabetes mellitus type 2, controlled     BG RUNS IN 'S IN AM    Hammertoe     Heel pain     Hyperlipemia     Hypertension     Left foot pain     Lymphedema     NO PROBLEMS LATELY    Numbness in feet     Pulmonary embolism 2013    SADDLE PE, RESOLVED       Past Surgical History:   Procedure Laterality Date    BACK SURGERY  11/2021    S1-L1 FUSION, CYST REMOVED (TOTAL OF 3 SURGERY 2016 TO 2021)    CARPAL TUNNEL RELEASE Right     COLONOSCOPY  2014    CYST REMOVAL      BACK    FOOT SURGERY Right     ACHELLIS TENDON/HAMMER TOE    HYDROCELECTOMY Left 5/12/2023    Procedure: HYDROCELECTOMY, left;  Surgeon: Nicky Robertson MD;  Location: [facility] MAIN OR;  Service: Urology;  Laterality: Left;    KNEE ARTHROSCOPY Right 2005    DIAGNOSTIC    TEMPORAL ARTERY BIOPSY Right 01/19/2022    Procedure: Right temporal artery biopsy;  Surgeon: Wilbert Jacob MD;  Location: [facility] MAIN OR;  Service: Vascular;  Laterality: Right;         Current Outpatient Medications:     albuterol sulfate  " (90 Base) MCG/ACT inhaler, 1 puff Every 6 (Six) Hours As Needed., Disp: , Rfl:     allopurinol (ZYLOPRIM) 300 MG tablet, Take 1 tablet by mouth Daily., Disp: 90 tablet, Rfl: 0    atorvastatin (LIPITOR) 20 MG tablet, Take 1 tablet by mouth Daily., Disp: 90 tablet, Rfl: 0    bumetanide (BUMEX) 1 MG tablet, Take 1 tablet by mouth Daily., Disp: , Rfl:     Diclofenac Sodium (VOLTAREN) 1 % gel gel, Apply 4 g topically to the appropriate area as directed 4 (Four) Times a Day As Needed., Disp: , Rfl:     DULoxetine (CYMBALTA) 60 MG capsule, Take 1 capsule by mouth Daily., Disp: , Rfl:     Jardiance 10 MG tablet tablet, Take 1 tablet by mouth Daily., Disp: , Rfl:     lidocaine (LIDODERM) 5 %, Place 1 patch on the skin as directed by provider Daily., Disp: , Rfl:     lisinopril (PRINIVIL,ZESTRIL) 10 MG tablet, , Disp: , Rfl:     metFORMIN (GLUCOPHAGE) 500 MG tablet, Take 2 tablets by mouth 2 (Two) Times a Day With Meals. INSTRUCTED PER ANESTHESIA STANDING ORDERS, Disp: , Rfl:     metoprolol succinate XL (TOPROL-XL) 200 MG 24 hr tablet, Take 1 tablet by mouth Daily., Disp: 90 tablet, Rfl: 0    potassium chloride (KLOR-CON M10) 10 MEQ CR tablet, Take 1 tablet by mouth Daily., Disp: , Rfl:     pregabalin (LYRICA) 150 MG capsule, Take 1 capsule by mouth 2 (Two) Times a Day., Disp: 180 capsule, Rfl: 0    Semaglutide, 2 MG/DOSE, (Ozempic, 2 MG/DOSE,) 8 MG/3ML solution pen-injector, Inject 2 mg under the skin into the appropriate area as directed., Disp: , Rfl:     silver sulfadiazine (SILVADENE, SSD) 1 % cream, Apply pea sized amount to affected area twice daiy as needed., Disp: , Rfl:     traMADol (ULTRAM) 50 MG tablet, Take 1 tablet by mouth., Disp: , Rfl:     Vibegron 75 MG tablet, Take 1 tablet by mouth Daily., Disp: 90 tablet, Rfl: 3    Allergies   Allergen Reactions    Aspirin Anaphylaxis        Family History   Problem Relation Age of Onset    Stroke Father     Heart disease Brother     Malamie Hyperthermia Neg Hx   "      Social History     Socioeconomic History    Marital status:    Tobacco Use    Smoking status: Former     Current packs/day: 0.00     Average packs/day: 0.5 packs/day for 4.1 years (2.0 ttl pk-yrs)     Types: Cigarettes     Start date:      Quit date: 1998     Years since quittin.1    Smokeless tobacco: Never   Vaping Use    Vaping status: Never Used   Substance and Sexual Activity    Alcohol use: Not Currently     Comment: VERY RARE    Drug use: Never    Sexual activity: Defer       Vital Signs:   Ht 181.5 cm (71.46\")   Wt 117 kg (258 lb)   BMI 35.52 kg/m²      Physical Exam     Result Review :   The following data was reviewed by: YU Telles on 2025:  Results for orders placed or performed in visit on 25   Bladder Scan    Collection Time: 25  2:59 PM   Result Value Ref Range    Urine Volume 0    POC Urinalysis Dipstick, Automated    Collection Time: 25  3:07 PM    Specimen: Urine   Result Value Ref Range    Color Yellow Yellow, Straw, Dark Yellow, Sabrina    Clarity, UA Clear Clear    Specific Gravity  1.010 1.005 - 1.030    pH, Urine 5.0 5.0 - 8.0    Leukocytes Negative Negative    Nitrite, UA Negative Negative    Protein, POC Negative Negative mg/dL    Glucose,  mg/dL (A) Negative mg/dL    Ketones, UA Negative Negative    Urobilinogen, UA 0.2 E.U./dL Normal, 0.2 E.U./dL    Bilirubin Negative Negative    Blood, UA Negative Negative    Lot Number [id number]     Expiration Date          Bladder Scan interpretation 2025    Estimation of residual urine via BVI 3000 Verathon Bladder Scan  MA/nurse performing: irma german ma    Residual Urine: 0 ml  Indication: Benign prostatic hyperplasia without lower urinary tract symptoms    Urinary incontinence, unspecified type   Position: Supine  Examination: Incremental scanning of the suprapubic area using 2.0 MHz transducer using copious amounts of acoustic gel.   Findings: An anechoic area was " demonstrated which represented the bladder, with measurement of residual urine as noted. I inspected this myself. In that the residual urine was stable or insignificant, refer to plan for treatment and plan necessary at this time.          Procedures        Assessment and Plan    Diagnoses and all orders for this visit:    1. Benign prostatic hyperplasia without lower urinary tract symptoms (Primary)  -     POC Urinalysis Dipstick, Automated  -     Bladder Scan    2. Urinary incontinence, unspecified type          He will continue 5 more series of PTNS.     Will do monthly treatments after that.     Will plan for f/u in 6 months or sooner if needed.      I spent 10 minutes caring for Jonas on this date of service. This time includes time spent by me in the following activities:reviewing tests, obtaining and/or reviewing a separately obtained history, performing a medically appropriate examination and/or evaluation , counseling and educating the patient/family/caregiver, ordering medications, tests, or procedures, and documenting information in the medical record  Follow Up   Return in about 6 months (around 9/12/2025) for f/u oab with PVR .  Patient was given instructions and counseling regarding his condition or for health maintenance advice. Please see specific information pulled into the AVS if appropriate.         This document has been electronically signed by YU Telles  March 12, 2025 15:17 EDT

## 2025-03-12 NOTE — PROGRESS NOTES
"Chief Complaint  Urinary Incontinence (Patient states that he is still having urinary frequency. He states that the Gemtesa is helping. )    Subjective        Jonas Mercado presents to [facility] UROLOGY  History of Present Illness    Objective   Vital Signs:  Ht 181.5 cm (71.46\")   Wt 117 kg (258 lb)   BMI 35.52 kg/m²   Estimated body mass index is 35.52 kg/m² as calculated from the following:    Height as of this encounter: 181.5 cm (71.46\").    Weight as of this encounter: 117 kg (258 lb).            Physical Exam   Result Review :         Percutaneous Nerve Evaluation    Date/Time: 3/12/2025 3:21 PM    Performed by: May Galindo RN  Authorized by: Anya Cordon APRN  Preparation: Patient was prepped and draped in the usual sterile fashion.  Local anesthesia used: no    Anesthesia:  Local anesthesia used: no    Sedation:  Patient sedated: no    Patient tolerance: patient tolerated the procedure well with no immediate complications  Comments: Percutaneous Tibial Nerve Stimulation #6.  The patient was properly identified in position for the procedure sitting comfortably in a chair with the right leg and foot exposed.  The NURO device was prepared.  Therapy session kit opened and the needle espitia and ground pad attached to the device.  The ground pad placement site was determined and cleaned and dried.  The adhesive pad was removed from the ground pad and the device was placed on the patient within reach of the needle insertion site.     An alcohol pad was used to clean and dry the needle insertion site area.  The needle insertion site was located on the inner aspect of the right leg and approximately 3 finger breadths up from the medial malleolus and approximately 1 fingerbreadth posterior from the tibia.  The sterile needle package was opened the needle/guide to assembled and positioned over the identified and cleaned insertion site of the 60 degree angle between the shaft of the " needle and skin.  The needle tip pointed cephalad.     The stop plug was removed to release the needle.  The needle head was gently tapped on to devine the skin.  Once the needle pierced the skin, the guide tube was removed.  The needle was advanced using a rotating motion to facilitate entry until approximately half of the needle was inserted into the leg.  The plunger on the needle espitia was depressed to expose the J hook.  The J hook was looped around the needle, close to the skin without pinching the skin.     The NURO device was turned on and the power button was pressed again to advance the stimulation level adjustment screen.  The stimulation was increased until the patient elicited a motor and sensory response.  The sensory response was tingling across the bottom of the foot/heel.  The motor response was toe flexion.  Once the appropriate responses were observed, therapy was started by pressing the start button.  The patient received 30-minute therapy session.     Once therapy session was complete.  The device was turned off by holding down the power button.  The needle espitia was detached from the needle.  The needle was removed from the leg using a smooth fluid motion and the neuro device and ground pad removed from the patient.  Patient tolerated procedure well.      This procedure was done under my direct supervision. I was in the immediate area for the entire portion of the procedure.          Assessment and Plan   Diagnoses and all orders for this visit:    1. Benign prostatic hyperplasia without lower urinary tract symptoms (Primary)  -     POC Urinalysis Dipstick, Automated  -     Bladder Scan    2. Urinary incontinence, unspecified type             Follow Up   Return in about 6 months (around 9/12/2025) for f/u oab with PVR .  Patient was given instructions and counseling regarding his condition or for health maintenance advice. Please see specific information pulled into the AVS if appropriate.

## 2025-03-26 ENCOUNTER — CLINICAL SUPPORT (OUTPATIENT)
Dept: UROLOGY | Facility: CLINIC | Age: 75
End: 2025-03-26
Payer: MEDICARE

## 2025-03-26 VITALS — WEIGHT: 258 LBS | BODY MASS INDEX: 36.12 KG/M2 | RESPIRATION RATE: 18 BRPM | HEIGHT: 71 IN

## 2025-03-26 DIAGNOSIS — R32 URINARY INCONTINENCE, UNSPECIFIED TYPE: Primary | ICD-10-CM

## 2025-03-26 PROCEDURE — 64566 NEUROELTRD STIM POST TIBIAL: CPT | Performed by: NURSE PRACTITIONER

## 2025-03-26 NOTE — PROGRESS NOTES
"Chief Complaint  PTNS #8    Subjective        Jonas Mercado presents to [facility] UROLOGY  History of Present Illness    Objective   Vital Signs:  Resp 18   Ht 181.5 cm (71.46\")   Wt 117 kg (258 lb)   BMI 35.52 kg/m²   Estimated body mass index is 35.52 kg/m² as calculated from the following:    Height as of this encounter: 181.5 cm (71.46\").    Weight as of this encounter: 117 kg (258 lb).            Physical Exam   Result Review :         Percutaneous Nerve Evaluation    Date/Time: 3/26/2025 1:57 PM    Performed by: May Galindo RN  Authorized by: Anya Cordon APRN  Preparation: Patient was prepped and draped in the usual sterile fashion.  Local anesthesia used: no    Anesthesia:  Local anesthesia used: no    Sedation:  Patient sedated: no    Patient tolerance: patient tolerated the procedure well with no immediate complications  Comments: Percutaneous Tibial Nerve Stimulation #14.  The patient was properly identified in position for the procedure sitting comfortably in a chair with the right leg and foot exposed.  The NURO device was prepared.  Therapy session kit opened and the needle espitia and ground pad attached to the device.  The ground pad placement site was determined and cleaned and dried.  The adhesive pad was removed from the ground pad and the device was placed on the patient within reach of the needle insertion site.     An alcohol pad was used to clean and dry the needle insertion site area.  The needle insertion site was located on the inner aspect of the right leg and approximately 3 finger breadths up from the medial malleolus and approximately 1 fingerbreadth posterior from the tibia.  The sterile needle package was opened the needle/guide to assembled and positioned over the identified and cleaned insertion site of the 60 degree angle between the shaft of the needle and skin.  The needle tip pointed cephalad.     The stop plug was removed to release the " needle.  The needle head was gently tapped on to devine the skin.  Once the needle pierced the skin, the guide tube was removed.  The needle was advanced using a rotating motion to facilitate entry until approximately half of the needle was inserted into the leg.  The plunger on the needle espitia was depressed to expose the J hook.  The J hook was looped around the needle, close to the skin without pinching the skin.     The NURO device was turned on and the power button was pressed again to advance the stimulation level adjustment screen.  The stimulation was increased until the patient elicited a motor and sensory response.  The sensory response was tingling across the bottom of the foot/heel.  The motor response was toe flexion.  Once the appropriate responses were observed, therapy was started by pressing the start button.  The patient received 30-minute therapy session.     Once therapy session was complete.  The device was turned off by holding down the power button.  The needle espitia was detached from the needle.  The needle was removed from the leg using a smooth fluid motion and the neuro device and ground pad removed from the patient.  Patient tolerated procedure well.      This procedure was done under my direct supervision. I was in the immediate area for the entire portion of the procedure.          Assessment and Plan   There are no diagnoses linked to this encounter.         Follow Up   No follow-ups on file.  Patient was given instructions and counseling regarding his condition or for health maintenance advice. Please see specific information pulled into the AVS if appropriate.

## 2025-04-02 ENCOUNTER — CLINICAL SUPPORT (OUTPATIENT)
Dept: UROLOGY | Facility: CLINIC | Age: 75
End: 2025-04-02
Payer: MEDICARE

## 2025-04-02 VITALS — BODY MASS INDEX: 36.12 KG/M2 | HEIGHT: 71 IN | WEIGHT: 258 LBS | RESPIRATION RATE: 18 BRPM

## 2025-04-02 DIAGNOSIS — R32 URINARY INCONTINENCE, UNSPECIFIED TYPE: Primary | ICD-10-CM

## 2025-04-02 NOTE — PROGRESS NOTES
"Chief Complaint  PTNS #9    Subjective        Jonas Mercado presents to [facility] UROLOGY  History of Present Illness    Objective   Vital Signs:  Resp 18   Ht 181.5 cm (71.46\")   Wt 117 kg (258 lb)   BMI 35.52 kg/m²   Estimated body mass index is 35.52 kg/m² as calculated from the following:    Height as of this encounter: 181.5 cm (71.46\").    Weight as of this encounter: 117 kg (258 lb).            Physical Exam   Result Review :         Percutaneous Nerve Evaluation    Date/Time: 4/2/2025 2:07 PM    Performed by: May Galindo RN  Authorized by: Anya Cordon APRN  Preparation: Patient was prepped and draped in the usual sterile fashion.  Local anesthesia used: no    Anesthesia:  Local anesthesia used: no    Sedation:  Patient sedated: no    Patient tolerance: patient tolerated the procedure well with no immediate complications  Comments: Percutaneous Tibial Nerve Stimulation #17.  The patient was properly identified in position for the procedure sitting comfortably in a chair with the right leg and foot exposed.  The NURO device was prepared.  Therapy session kit opened and the needle espitia and ground pad attached to the device.  The ground pad placement site was determined and cleaned and dried.  The adhesive pad was removed from the ground pad and the device was placed on the patient within reach of the needle insertion site.     An alcohol pad was used to clean and dry the needle insertion site area.  The needle insertion site was located on the inner aspect of the right leg and approximately 3 finger breadths up from the medial malleolus and approximately 1 fingerbreadth posterior from the tibia.  The sterile needle package was opened the needle/guide to assembled and positioned over the identified and cleaned insertion site of the 60 degree angle between the shaft of the needle and skin.  The needle tip pointed cephalad.     The stop plug was removed to release the " needle.  The needle head was gently tapped on to devine the skin.  Once the needle pierced the skin, the guide tube was removed.  The needle was advanced using a rotating motion to facilitate entry until approximately half of the needle was inserted into the leg.  The plunger on the needle espitia was depressed to expose the J hook.  The J hook was looped around the needle, close to the skin without pinching the skin.     The NURO device was turned on and the power button was pressed again to advance the stimulation level adjustment screen.  The stimulation was increased until the patient elicited a motor and sensory response.  The sensory response was tingling across the bottom of the foot/heel.  The motor response was toe flexion.  Once the appropriate responses were observed, therapy was started by pressing the start button.  The patient received 30-minute therapy session.     Once therapy session was complete.  The device was turned off by holding down the power button.  The needle espitia was detached from the needle.  The needle was removed from the leg using a smooth fluid motion and the neuro device and ground pad removed from the patient.  Patient tolerated procedure well.      This procedure was done under my direct supervision. I was in the immediate area for the entire portion of the procedure.          Assessment and Plan   There are no diagnoses linked to this encounter.         Follow Up   No follow-ups on file.  Patient was given instructions and counseling regarding his condition or for health maintenance advice. Please see specific information pulled into the AVS if appropriate.

## 2025-04-09 ENCOUNTER — CLINICAL SUPPORT (OUTPATIENT)
Dept: UROLOGY | Facility: CLINIC | Age: 75
End: 2025-04-09
Payer: MEDICARE

## 2025-04-09 VITALS — WEIGHT: 258 LBS | BODY MASS INDEX: 36.12 KG/M2 | RESPIRATION RATE: 18 BRPM | HEIGHT: 71 IN

## 2025-04-09 DIAGNOSIS — R32 URINARY INCONTINENCE, UNSPECIFIED TYPE: Primary | ICD-10-CM

## 2025-04-09 NOTE — PROGRESS NOTES
"Chief Complaint  PTNS #10    Subjective        Jonas Mercado presents to [facility] UROLOGY  History of Present Illness    Objective   Vital Signs:  Resp 18   Ht 181.5 cm (71.46\")   Wt 117 kg (258 lb)   BMI 35.52 kg/m²   Estimated body mass index is 35.52 kg/m² as calculated from the following:    Height as of this encounter: 181.5 cm (71.46\").    Weight as of this encounter: 117 kg (258 lb).            Physical Exam   Result Review :         Percutaneous Nerve Evaluation    Date/Time: 4/9/2025 2:01 PM    Performed by: May Galindo RN  Authorized by: Anya Cordon APRN  Preparation: Patient was prepped and draped in the usual sterile fashion.  Local anesthesia used: no    Anesthesia:  Local anesthesia used: no    Sedation:  Patient sedated: no    Patient tolerance: patient tolerated the procedure well with no immediate complications  Comments: Percutaneous Tibial Nerve Stimulation #5.  The patient was properly identified in position for the procedure sitting comfortably in a chair with the right leg and foot exposed.  The NURO device was prepared.  Therapy session kit opened and the needle espitia and ground pad attached to the device.  The ground pad placement site was determined and cleaned and dried.  The adhesive pad was removed from the ground pad and the device was placed on the patient within reach of the needle insertion site.     An alcohol pad was used to clean and dry the needle insertion site area.  The needle insertion site was located on the inner aspect of the right leg and approximately 3 finger breadths up from the medial malleolus and approximately 1 fingerbreadth posterior from the tibia.  The sterile needle package was opened the needle/guide to assembled and positioned over the identified and cleaned insertion site of the 60 degree angle between the shaft of the needle and skin.  The needle tip pointed cephalad.     The stop plug was removed to release the " needle.  The needle head was gently tapped on to devine the skin.  Once the needle pierced the skin, the guide tube was removed.  The needle was advanced using a rotating motion to facilitate entry until approximately half of the needle was inserted into the leg.  The plunger on the needle espitia was depressed to expose the J hook.  The J hook was looped around the needle, close to the skin without pinching the skin.     The NURO device was turned on and the power button was pressed again to advance the stimulation level adjustment screen.  The stimulation was increased until the patient elicited a motor and sensory response.  The sensory response was tingling across the bottom of the foot/heel.  The motor response was toe flexion.  Once the appropriate responses were observed, therapy was started by pressing the start button.  The patient received 30-minute therapy session.     Once therapy session was complete.  The device was turned off by holding down the power button.  The needle espitia was detached from the needle.  The needle was removed from the leg using a smooth fluid motion and the neuro device and ground pad removed from the patient.  Patient tolerated procedure well.      This procedure was done under my direct supervision. I was in the immediate area for the entire portion of the procedure.          Assessment and Plan   There are no diagnoses linked to this encounter.         Follow Up   No follow-ups on file.  Patient was given instructions and counseling regarding his condition or for health maintenance advice. Please see specific information pulled into the AVS if appropriate.

## 2025-04-16 ENCOUNTER — CLINICAL SUPPORT (OUTPATIENT)
Dept: UROLOGY | Age: 75
End: 2025-04-16
Payer: MEDICARE

## 2025-04-16 DIAGNOSIS — N32.81 OVERACTIVE BLADDER: Primary | ICD-10-CM

## 2025-04-16 RX ORDER — TAMSULOSIN HYDROCHLORIDE 0.4 MG/1
1 CAPSULE ORAL DAILY
COMMUNITY
Start: 2025-03-12

## 2025-04-16 RX ORDER — METOLAZONE 5 MG/1
5 TABLET ORAL DAILY
COMMUNITY
Start: 2025-03-17 | End: 2026-03-17

## 2025-04-16 NOTE — PROGRESS NOTES
Procedure   Percutaneous Nerve Evaluation    Date/Time: 4/16/2025 2:14 PM    Performed by: Bronwyn Henderson RN  Authorized by: Anya Cordon APRN  Local anesthesia used: no    Anesthesia:  Local anesthesia used: no    Sedation:  Patient sedated: no    Patient tolerance: patient tolerated the procedure well with no immediate complications  Comments: Pt here for PTNS

## 2025-04-16 NOTE — PROGRESS NOTES
Procedure   Percutaneous Nerve Evaluation    Date/Time: 4/16/2025 2:29 PM    Performed by: Bronwyn Henderson RN  Authorized by: Anya Cordon APRN  Local anesthesia used: no    Anesthesia:  Local anesthesia used: no    Sedation:  Patient sedated: no    Patient tolerance: patient tolerated the procedure well with no immediate complications  Comments: Percutaneous Tibial Nerve Stimulation #10/Level 8.  The patient was properly identified in position for the procedure sitting comfortably in a chair with the left leg and foot exposed.  The NURO device was prepared.  Therapy session kit opened and the needle espitia and ground pad attached to the device.  The ground pad placement site was determined and cleaned and dried.  The adhesive pad was removed from the ground pad and the device was placed on the patient within reach of the needle insertion site.     An alcohol pad was used to clean and dry the needle insertion site area.  The needle insertion site was located on the inner aspect of the left leg and approximately 3 finger breadths up from the medial malleolus and approximately 1 fingerbreadth posterior from the tibia.  The sterile needle package was opened the needle/guide to assembled and positioned over the identified and cleaned insertion site of the 60 degree angle between the shaft of the needle and skin.  The needle tip pointed cephalad.     The stop plug was removed to release the needle.  The needle head was gently tapped on to devine the skin.  Once the needle pierced the skin, the guide tube was removed.  The needle was advanced using a rotating motion to facilitate entry until approximately half of the needle was inserted into the leg.  The plunger on the needle espitia was depressed to expose the J hook.  The J hook was looped around the needle, close to the skin without pinching the skin.     The NURO device was turned on and the power button was pressed again to advance the stimulation level  adjustment screen.  The stimulation was increased until the patient elicited a motor and sensory response.  The sensory response was tingling across the bottom of the foot/heel.  The motor response was toe flexion.  Once the appropriate responses were observed, therapy was started by pressing the start button.  The patient received 30-minute therapy session.     Once therapy session was complete.  The device was turned off by holding down the power button.  The needle espitia was detached from the needle.  The needle was removed from the leg using a smooth fluid motion and the neuro device and ground pad removed from the patient.  Patient tolerated procedure well.      This procedure was done under my direct supervision. I was in the immediate area for the entire portion of the procedure.

## 2025-04-23 ENCOUNTER — CLINICAL SUPPORT (OUTPATIENT)
Dept: UROLOGY | Age: 75
End: 2025-04-23
Payer: MEDICARE

## 2025-04-23 DIAGNOSIS — N32.81 OVERACTIVE BLADDER: Primary | ICD-10-CM

## 2025-04-23 NOTE — PROGRESS NOTES
Procedure   Percutaneous Nerve Evaluation    Date/Time: 4/23/2025 1:55 PM    Performed by: Bronwyn Henderson RN  Authorized by: Anya Cordon APRN  Local anesthesia used: no    Anesthesia:  Local anesthesia used: no    Sedation:  Patient sedated: no    Patient tolerance: patient tolerated the procedure well with no immediate complications  Comments: Percutaneous Tibial Nerve Stimulation #12/Level 3.  The patient was properly identified in position for the procedure sitting comfortably in a chair with the right leg and foot exposed.  The NURO device was prepared.  Therapy session kit opened and the needle espitia and ground pad attached to the device.  The ground pad placement site was determined and cleaned and dried.  The adhesive pad was removed from the ground pad and the device was placed on the patient within reach of the needle insertion site.     An alcohol pad was used to clean and dry the needle insertion site area.  The needle insertion site was located on the inner aspect of the right leg and approximately 3 finger breadths up from the medial malleolus and approximately 1 fingerbreadth posterior from the tibia.  The sterile needle package was opened the needle/guide to assembled and positioned over the identified and cleaned insertion site of the 60 degree angle between the shaft of the needle and skin.  The needle tip pointed cephalad.     The stop plug was removed to release the needle.  The needle head was gently tapped on to devine the skin.  Once the needle pierced the skin, the guide tube was removed.  The needle was advanced using a rotating motion to facilitate entry until approximately half of the needle was inserted into the leg.  The plunger on the needle espitia was depressed to expose the J hook.  The J hook was looped around the needle, close to the skin without pinching the skin.     The NURO device was turned on and the power button was pressed again to advance the stimulation level  adjustment screen.  The stimulation was increased until the patient elicited a motor and sensory response.  The sensory response was tingling across the bottom of the foot/heel.  The motor response was toe flexion.  Once the appropriate responses were observed, therapy was started by pressing the start button.  The patient received 30-minute therapy session.     Once therapy session was complete.  The device was turned off by holding down the power button.  The needle espitia was detached from the needle.  The needle was removed from the leg using a smooth fluid motion and the neuro device and ground pad removed from the patient.  Patient tolerated procedure well.      This procedure was done under my direct supervision. I was in the immediate area for the entire portion of the procedure.

## 2025-05-01 ENCOUNTER — OFFICE VISIT (OUTPATIENT)
Dept: SLEEP MEDICINE | Facility: HOSPITAL | Age: 75
End: 2025-05-01
Payer: MEDICARE

## 2025-05-01 VITALS
OXYGEN SATURATION: 98 % | HEIGHT: 71 IN | BODY MASS INDEX: 31.92 KG/M2 | DIASTOLIC BLOOD PRESSURE: 60 MMHG | WEIGHT: 228 LBS | SYSTOLIC BLOOD PRESSURE: 106 MMHG | HEART RATE: 63 BPM

## 2025-05-01 DIAGNOSIS — G47.33 OSA (OBSTRUCTIVE SLEEP APNEA): Primary | ICD-10-CM

## 2025-05-01 PROCEDURE — G0463 HOSPITAL OUTPT CLINIC VISIT: HCPCS

## 2025-05-01 NOTE — PROGRESS NOTES
"[facility]         Dr Trudy Anderson  [unfilled]  Patient Care Team:  Tristan Phipps MD as PCP - General (Family Medicine)  Nicky Robertson MD as Consulting Physician (Urology)    Chief Complaint:KARLA with AHI of 7.2 events per hour and with titration of CPAP at 14 cm     Interval History: Patient here for annual compliance visit.  Currently on CPAP 14 cm with compliance 90% with average daily use 7 hours 35 minutes.  AHI leak within normal limits.  Unfortunately in the last 1 year he has had several hospitalization.  With his last hospitalization being told he had pneumonia he was sent home on oxygen.  Currently using oxygen as needed and with sleep.  He feels rested with his CPAP.  He reports he feels the pressure too high causing leak into his eyes.  Wants pressure to be reduced.  Goes to bed at 10 PM gets up 730 gets about 9+ hours of sleep and rested with CPAP.  No tobacco no alcohol or caffeine abuse.  Currently has a nasal pillow that fits well.  Supplies been adequate.  [place] 10 out of 24 consistent with sleepiness.    REVIEW OF SYSTEMS:   CARDIOVASCULAR: No chest pain, chest pressure or chest discomfort. No palpitations or edema.   RESPIRATORY: No shortness of breath, cough or sputum.   GASTROINTESTINAL: No anorexia, nausea, vomiting or diarrhea. No abdominal pain or blood.   HEMATOLOGIC: No bleeding or bruising.  Positive for dry mouth and morning headache    Ventilator/Non-Invasive Ventilation Settings (From admission, onward)      None              Vital Signs  Heart Rate:  [63] 63  BP: (106)/(60) 106/60  [unfilled]  Flowsheet Rows      Flowsheet Row First Filed Value   Admission Height 181.6 cm (71.5\") Documented at 05/01/2025 1300   Admission Weight 103 kg (228 lb) Documented at 05/01/2025 1300                  Physical Exam:  Patient is examined using the personal protective equipment as per guidelines from infection control for this particular patient as enacted.  Hand hygiene was " performed before and after patient interaction.   General Appearance:    Alert, cooperative, in no acute distress.  Following simple commands  ENT Mallampati between 3 and 4 no nasal congestion  Neck midline trachea, no thyromegaly   Lungs:     Clear to auscultation, respirations regular, even and                  unlabored    Heart:    Regular rhythm and normal rate, normal S1 and S2, no            murmur, no gallop, no rub, no click   Chest Wall:    No abnormalities observed   Abdomen:     Normal bowel sounds, no masses, no organomegaly, soft        nontender, nondistended, no guarding, no rebound                tenderness   Extremities:   Moves all extremities well, no edema, no cyanosis, no             redness  CNS no focal neurological deficits normal sensory exam  Skin no rashes no nodules  Musculoskeletal no cyanosis no clubbing normal range of motion     Results Review:                                          I reviewed the patient's new clinical results.  I personally viewed and interpreted the patient's chest x-ray.        Medication Review:       No current facility-administered medications for this visit.      ASSESSMENT:   1.  Obstructive sleep apnea, on CPAP.    2.  Morbid obesity (BMI = 40).    3.  Hypertension  4 diabetes mellitus    PLAN:  Reviewed compliance download  Compliance AHI leak look excellent  Per patient's request we will reduce CPAP pressure to 12 cm  Continue using oxygen with CPAP  Advised patient to follow-up with PCP or contact referral for a local pulmonologist for need for oxygen during the daytime as needed and with sleep  Sleep hygiene measures  Treatment of underlying comorbidities  Supplies renewed with oxygen adapter  I will see him back in 1 year      Trudy Anderson MD  05/01/25  13:32 EDT

## 2025-05-21 ENCOUNTER — CLINICAL SUPPORT (OUTPATIENT)
Dept: UROLOGY | Facility: CLINIC | Age: 75
End: 2025-05-21
Payer: MEDICARE

## 2025-05-21 VITALS — RESPIRATION RATE: 19 BRPM

## 2025-05-21 DIAGNOSIS — N32.81 OVERACTIVE BLADDER: Primary | ICD-10-CM

## 2025-05-21 RX ORDER — VIBEGRON 75 MG/1
75 TABLET, FILM COATED ORAL DAILY
Qty: 28 TABLET | Refills: 0 | COMMUNITY
Start: 2025-05-21

## 2025-05-21 NOTE — PROGRESS NOTES
Pt presented to the office for PTNS treatment. PTNS machine would not work so pt was unable to have treatment today. He will go to [facility] office Friday for treatment per Anya NICHOLAS. Pt was also given more Gemtesa samples.

## 2025-05-21 NOTE — PROGRESS NOTES
Subjective   Jonas Mercado is a 74 y.o. male.     History of Present Illness       Review of Systems    Objective   Physical Exam    Assessment & Plan   {Assess/PlanSmartLinks:[id number]}

## 2025-05-22 ENCOUNTER — TELEPHONE (OUTPATIENT)
Dept: UROLOGY | Age: 75
End: 2025-05-22

## 2025-05-22 NOTE — TELEPHONE ENCOUNTER
Caller: RUPAL RITCHIE    Relationship to patient: SELF    Best call back number: [phone]    Chief complaint: PATIENT SEEN IN [place] YESTERDAY, TREATMENT UNSUCCESSFUL IS SUPPOSED TO HAVE AN APPOINTMENT WITH [facility] TOMORROW IN [facility].     PATIENT REQUESTING BETWEEN 11 AND 1 PM     HUB AGENT UNABLE TO WARM TRANSFER, PLEASE REACH OUT ASAP ON CELL

## 2025-05-23 ENCOUNTER — CLINICAL SUPPORT (OUTPATIENT)
Dept: UROLOGY | Age: 75
End: 2025-05-23
Payer: MEDICARE

## 2025-05-23 DIAGNOSIS — N32.81 OVERACTIVE BLADDER: Primary | ICD-10-CM

## 2025-05-23 NOTE — PROGRESS NOTES
Procedure   Percutaneous Nerve Evaluation    Date/Time: 5/23/2025 2:02 PM    Performed by: Junie Carlton RN  Authorized by: Nicky Robertson MD  Local anesthesia used: no    Anesthesia:  Local anesthesia used: no    Sedation:  Patient sedated: no    Patient tolerance: patient tolerated the procedure well with no immediate complications  Comments: Percutaneous Tibial Nerve Stimulation monthly .  The patient was properly identified in position for the procedure sitting comfortably in a chair with the left leg and foot exposed.  The NURO device was prepared.  Therapy session kit opened and the needle espitia and ground pad attached to the device.  The ground pad placement site was determined and cleaned and dried.  The adhesive pad was removed from the ground pad and the device was placed on the patient within reach of the needle insertion site.     An alcohol pad was used to clean and dry the needle insertion site area.  The needle insertion site was located on the inner aspect of the LEFT leg and approximately 3 finger breadths up from the medial malleolus and approximately 1 fingerbreadth posterior from the tibia.  The sterile needle package was opened the needle/guide to assembled and positioned over the identified and cleaned insertion site of the 60 degree angle between the shaft of the needle and skin.  The needle tip pointed cephalad.     The stop plug was removed to release the needle.  The needle head was gently tapped on to devine the skin.  Once the needle pierced the skin, the guide tube was removed.  The needle was advanced using a rotating motion to facilitate entry until approximately half of the needle was inserted into the leg.  The plunger on the needle espitia was depressed to expose the J hook.  The J hook was looped around the needle, close to the skin without pinching the skin.     The NURO device was turned on and the power button was pressed again to advance the stimulation level adjustment  screen.  The stimulation was increased until the patient elicited a motor and sensory response.  The sensory response was tingling across the bottom of the foot/heel.  The motor response was toe flexion.  Once the appropriate responses were observed, therapy was started by pressing the start button.  The patient received 30-minute therapy session.     Once therapy session was complete.  The device was turned off by holding down the power button.  The needle espitia was detached from the needle.  The needle was removed from the leg using a smooth fluid motion and the neuro device and ground pad removed from the patient.  Patient tolerated procedure well.      This procedure was done under my direct supervision. I was in the immediate area for the entire portion of the procedure.

## 2025-05-23 NOTE — PROGRESS NOTES
Procedure   Percutaneous Nerve Evaluation    Date/Time: 5/23/2025 2:10 PM    Performed by: Junie Carlton RN  Authorized by: Anya Cordon APRN  Local anesthesia used: no    Anesthesia:  Local anesthesia used: no    Sedation:  Patient sedated: no    Patient tolerance: patient tolerated the procedure well with no immediate complications  Comments: Percutaneous Tibial Nerve Stimulation monthly .  The patient was properly identified in position for the procedure sitting comfortably in a chair with the left leg and foot exposed.  The NURO device was prepared.  Therapy session kit opened and the needle espitia and ground pad attached to the device.  The ground pad placement site was determined and cleaned and dried.  The adhesive pad was removed from the ground pad and the device was placed on the patient within reach of the needle insertion site.     An alcohol pad was used to clean and dry the needle insertion site area.  The needle insertion site was located on the inner aspect of the LEFT leg and approximately 3 finger breadths up from the medial malleolus and approximately 1 fingerbreadth posterior from the tibia.  The sterile needle package was opened the needle/guide to assembled and positioned over the identified and cleaned insertion site of the 60 degree angle between the shaft of the needle and skin.  The needle tip pointed cephalad.     The stop plug was removed to release the needle.  The needle head was gently tapped on to devine the skin.  Once the needle pierced the skin, the guide tube was removed.  The needle was advanced using a rotating motion to facilitate entry until approximately half of the needle was inserted into the leg.  The plunger on the needle espitia was depressed to expose the J hook.  The J hook was looped around the needle, close to the skin without pinching the skin.     The NURO device was turned on and the power button was pressed again to advance the stimulation level adjustment  screen.  The stimulation was increased until the patient elicited a motor and sensory response.  The sensory response was tingling across the bottom of the foot/heel.  The motor response was toe flexion.  Once the appropriate responses were observed, therapy was started by pressing the start button.  The patient received 30-minute therapy session.     Once therapy session was complete.  The device was turned off by holding down the power button.  The needle espitia was detached from the needle.  The needle was removed from the leg using a smooth fluid motion and the neuro device and ground pad removed from the patient.  Patient tolerated procedure well.      This procedure was done under my direct supervision. I was in the immediate area for the entire portion of the procedure.

## 2025-06-02 ENCOUNTER — OFFICE VISIT (OUTPATIENT)
Dept: ORTHOPEDIC SURGERY | Facility: CLINIC | Age: 75
End: 2025-06-02
Payer: MEDICARE

## 2025-06-02 VITALS — BODY MASS INDEX: 30.88 KG/M2 | OXYGEN SATURATION: 95 % | HEART RATE: 76 BPM | WEIGHT: 228 LBS | HEIGHT: 72 IN

## 2025-06-02 DIAGNOSIS — M17.0 PRIMARY OSTEOARTHRITIS OF BOTH KNEES: Primary | ICD-10-CM

## 2025-06-02 PROCEDURE — 1160F RVW MEDS BY RX/DR IN RCRD: CPT | Performed by: STUDENT IN AN ORGANIZED HEALTH CARE EDUCATION/TRAINING PROGRAM

## 2025-06-02 PROCEDURE — 20610 DRAIN/INJ JOINT/BURSA W/O US: CPT | Performed by: STUDENT IN AN ORGANIZED HEALTH CARE EDUCATION/TRAINING PROGRAM

## 2025-06-02 PROCEDURE — 1159F MED LIST DOCD IN RCRD: CPT | Performed by: STUDENT IN AN ORGANIZED HEALTH CARE EDUCATION/TRAINING PROGRAM

## 2025-06-02 RX ORDER — LIDOCAINE HYDROCHLORIDE 10 MG/ML
5 INJECTION, SOLUTION INFILTRATION; PERINEURAL
Status: COMPLETED | OUTPATIENT
Start: 2025-06-02 | End: 2025-06-02

## 2025-06-02 RX ADMIN — LIDOCAINE HYDROCHLORIDE 5 ML: 10 INJECTION, SOLUTION INFILTRATION; PERINEURAL at 13:53

## 2025-06-02 RX ADMIN — LIDOCAINE HYDROCHLORIDE 5 ML: 10 INJECTION, SOLUTION INFILTRATION; PERINEURAL at 13:54

## 2025-06-02 NOTE — PROGRESS NOTES
"Chief Complaint  Follow-up of the Right Knee and Pain and Follow-up of the Left Knee    Subjective          Jonas Mercado presents to [facility] for follow up of bilateral knees.    History of Present Illness    Patient has bilateral knee arthritis that we are treating conservatively. Today, patient states that he is doing okay. He reports pain and sensitivity to the medial aspect of his right knee. He had bilateral knee steroid injections on 25.  He has had increase pain the last few weeks and is here for injections.    Allergies   Allergen Reactions    Aspirin Anaphylaxis        Social History     Socioeconomic History    Marital status:    Tobacco Use    Smoking status: Former     Current packs/day: 0.00     Average packs/day: 0.5 packs/day for 4.1 years (2.0 ttl pk-yrs)     Types: Cigarettes     Start date:      Quit date: 1998     Years since quittin.3    Smokeless tobacco: Never   Vaping Use    Vaping status: Never Used   Substance and Sexual Activity    Alcohol use: Not Currently     Comment: VERY RARE    Drug use: Never    Sexual activity: Defer        I reviewed the patient's chief complaint, history of present illness, review of systems, past medical history, surgical history, family history, social history, medications, and allergy list.     REVIEW OF SYSTEMS    Constitutional: Denies fevers, chills, weight loss  Cardiovascular: Denies chest pain, shortness of breath  Skin: Denies rashes, acute skin changes  Neurologic: Denies headache, loss of consciousness  MSK: Bilateral knee pain.       Objective   Vital Signs:   Pulse 76   Ht 181.6 cm (71.5\")   Wt 103 kg (228 lb)   SpO2 95%   BMI 31.36 kg/m²     Body mass index is 31.36 kg/m².    Physical Exam    General: Alert. No acute distress.   Left lower extremity: 5 degrees shy full extension. Knee flexion 110. Knee extensor mechanism intact. Knee stable to varus and valgus stress. Patellar " crepitus with motion. Calf soft, nontender. Lower extremity edema bilaterally. Demonstrates active ankle plantarflexion and dorsiflexion. Palpable pedal pulses.      Right lower extremity: Mild swelling to the knee.  10 degrees shy of full knee extension.  Knee flexion 100.  Knee extensor mechanism intact.  Knee stable to varus and valgus stress.  Patellar crepitus with motion.  Calf soft, nontender.  Lower extremity edema.  Demonstrates active ankle plantarflexion and dorsiflexion.  Palpable pedal pulses.    Right knee injection: R knee  Date/Time: 6/2/2025 1:53 PM  Consent given by: patient  Site marked: site marked  Timeout: Immediately prior to procedure a time out was called to verify the correct patient, procedure, equipment, support staff and site/side marked as required   Supporting Documentation  Indications: pain   Procedure Details  Location: knee - R knee  Needle gauge: 21g.  Medications administered: 5 mL lidocaine 1 %; 32 mg Triamcinolone Acetonide 32 MG  Patient tolerance: patient tolerated the procedure well with no immediate complications      Left knee injection: L knee  Date/Time: 6/2/2025 1:54 PM  Consent given by: patient  Site marked: site marked  Timeout: Immediately prior to procedure a time out was called to verify the correct patient, procedure, equipment, support staff and site/side marked as required   Supporting Documentation  Indications: pain   Procedure Details  Location: knee - L knee  Needle gauge: 21g.  Medications administered: 5 mL lidocaine 1 %; 32 mg Triamcinolone Acetonide 32 MG  Patient tolerance: patient tolerated the procedure well with no immediate complications    This injection documentation was Scribed for Bennett Carson MD by Bouchra Robles MA.  06/02/25   13:55 EDT    Imaging Results (Most Recent)       None                     Assessment and Plan             Diagnoses and all orders for this visit:    1. Primary osteoarthritis of both knees  (Primary)        Discussed the treatment plan with the patient.     Discussed the risks and benefits of conservative measures. The patient expressed understanding and wished to proceed with bilateral knee Zilretta injections.  He tolerated the injections well.     Discussed with the patient that due to the steroid injection given today in the office they may see an increase in blood sugar for a few days. Advised patient to monitor sugar after receiving the injection.     Discussed possibility of a reaction from the injection.  Discussed the possibility that the injection may not completely improve or remove the pain.  Discussed the risk of infection.        Call or return if worsening symptoms.    Scribed for Bennett Carson MD by Erika Ortiz MA  06/02/2025   13:29 EDT         Follow Up       PRN    Patient was given instructions and counseling regarding his condition or for health maintenance advice. Please see specific information pulled into the AVS if appropriate.       I have personally performed the services described in this document as scribed by the above individual and it is both accurate and complete. Bennett Carson MD 06/02/25 14:05 EDT

## 2025-06-05 ENCOUNTER — HOSPITAL ENCOUNTER (OUTPATIENT)
Dept: OCCUPATIONAL THERAPY | Facility: HOSPITAL | Age: 75
Setting detail: THERAPIES SERIES
Discharge: HOME | End: 2025-06-05
Payer: MEDICARE

## 2025-06-05 ENCOUNTER — TRANSCRIBE ORDERS (OUTPATIENT)
Dept: OCCUPATIONAL THERAPY | Facility: HOSPITAL | Age: 75
End: 2025-06-05
Payer: MEDICARE

## 2025-06-05 DIAGNOSIS — I89.0 LYMPHEDEMA: Primary | ICD-10-CM

## 2025-06-05 DIAGNOSIS — R26.2 DIFFICULTY IN WALKING, NOT ELSEWHERE CLASSIFIED: ICD-10-CM

## 2025-06-05 DIAGNOSIS — M62.81 GENERALIZED MUSCLE WEAKNESS: ICD-10-CM

## 2025-06-05 PROCEDURE — 97166 OT EVAL MOD COMPLEX 45 MIN: CPT

## 2025-06-05 NOTE — THERAPY EVALUATION
"Outpatient Occupational Therapy Lymphedema Initial Evaluation   Arnaldo     Patient Name: Jonas Mercado  : 1950  MRN: [id number]  Today's Date: 2025      Visit Date: 2025    Patient Active Problem List   Diagnosis    Lymphedema    Hyperlipemia    Hypertension    Temporal arteritis    Bilateral primary osteoarthritis of knee    BPH (benign prostatic hyperplasia)    Lumbar adjacent segment disease with spondylolisthesis    Spinal stenosis    Vitamin D deficiency    Major depressive disorder, recurrent, moderate    Type 2 diabetes mellitus without complication    Chronic diastolic (congestive) heart failure    Constipation, unspecified    Difficulty in walking, not elsewhere classified    Generalized muscle weakness    KARLA (obstructive sleep apnea)        Past Medical History:   Diagnosis Date    Arthritis     Back pain     Bilateral ankle pain     NEUROPATHY    Corns and callus     Coronary artery disease     \"STIFF HEART\" ON BB, NO CARDIOLOGIST, PCP (-)CP OR SOA    Diabetes mellitus type 2, controlled     BG RUNS IN 'S IN AM    Hammertoe     Heel pain     Hyperlipemia     Hypertension     Left foot pain     Lymphedema     NO PROBLEMS LATELY    Numbness in feet     Pulmonary embolism 2013    SADDLE PE, RESOLVED        Past Surgical History:   Procedure Laterality Date    BACK SURGERY  2021    S1-L1 FUSION, CYST REMOVED (TOTAL OF 3 SURGERY  TO )    CARPAL TUNNEL RELEASE Right     COLONOSCOPY      CYST REMOVAL      BACK    FOOT SURGERY Right     ACHELLIS TENDON/HAMMER TOE    HYDROCELECTOMY Left 2023    Procedure: HYDROCELECTOMY, left;  Surgeon: Nicky Robertson MD;  Location: [facility] MAIN OR;  Service: Urology;  Laterality: Left;    KNEE ARTHROSCOPY Right     DIAGNOSTIC    TEMPORAL ARTERY BIOPSY Right 2022    Procedure: Right temporal artery biopsy;  Surgeon: Wilbert Jacob MD;  Location: [facility] MAIN OR;  Service: Vascular;  Laterality: Right;         Visit " Dx:     ICD-10-CM ICD-9-CM   1. Lymphedema  I89.0 457.1   2. Difficulty in walking, not elsewhere classified  R26.2 719.7   3. Generalized muscle weakness  M62.81 728.87        Patient History       Row Name 06/05/25 1600             History    Chief Complaint Joint swelling  -JS      Date Current Problem(s) Began 10/17/24  -JS      Brief Description of Current Complaint The patient presents with complaints of BLE swelling, weeping, and the development of blisters  -JS      Previous treatment for THIS PROBLEM Other (comment)  previous compression  -JS      Patient/Caregiver Goals Relieve pain;Decrease swelling  -JS      Current Tobacco Use none  -JS      Patient's Rating of General Health Fair  -JS      Hand Dominance right-handed  -JS      How has patient tried to help current problem? Previous compression, MD dieuretics  -JS         Fall Risk Assessment    Any falls in the past year: Yes  -JS      Number of falls reported in the last 12 months 1  -JS      Factors that contributed to the fall: Lost balance;Tripped  -JS      Does patient have a fear of falling Yes (comment)  -JS      Previous Functional Level Ambulation  -JS         Services    Prior Rehab/Home Health Experiences No  -JS      Are you currently receiving Home Health services No  -JS      Do you plan to receive Home Health services in the near future No  -JS         Daily Activities    Primary Language English  -JS      Are you able to read Yes  -JS      Are you able to write Yes  -JS      How does patient learn best? Demonstration  -JS      Teaching needs identified Home Exercise Program;Management of Condition;Falls Prevention  -JS      Patient is concerned about/has problems with Walking;Flexibility  -JS      Barriers to learning None  -JS      Functional Status bathing;dressing  -JS      Pt Participated in POC and Goals Yes  -JS         Safety    Are you being hurt, hit, or frightened by anyone at home or in your life? No  -JS      Are you being  "neglected by a caregiver No  -JS      Have you had any of the following issues with N/A  -JS                User Key  (r) = Recorded By, (t) = Taken By, (c) = Cosigned By      Initials Name Provider Type    Alex Fernandez OT Occupational Therapist                     Lymphedema       Row Name 06/05/25 1600             Subjective Pain    Able to rate subjective pain? yes  -JS      Pre-Treatment Pain Level 4  -JS      Post-Treatment Pain Level 2  -JS      Subjective Pain Comment Pain in heels  -JS         Subjective    Subjective Comments Jonas Mercado is a 74-year-old male with a history of diabetes, bilateral lower extremity (BLE) wounds, spinal stenosis, generalized muscle weakness, arthritis, and coronary artery disease. The patient presents with complaints of BLE swelling, weeping, and the development of blisters. He reports that oral diuretics have not alleviated the swelling, but past use of compression wrapping was effective. The patient states he experiences heel pain with intermittent neuropathy and notes worsening skin breakdown, especially over the anterior legs and left foot.  -JS         Lymphedema Assessment    Lymphedema Classification RLE:;LLE:;stage 2 (Spontaneously Irreversible)  -JS         LLIS - Physical Concerns    The amount of pain associated with my lymphedema is: 3  -JS      The amount of limb heaviness associated with my lymphedema is: 3  -JS      The amount of skin tightness associated with my lymphedema is: 4  -JS      The size of my swollen limb(s) seems: 3  -JS      Lymphedema affects the movement of my swollen limb(s): 4  -JS      The strength in my swollen limb(s) is: 2  -JS         LLIS - Psychosocial Concerns    Lymphedema affects my body image (i.e., \"how I think I look\"). 2  -JS      Lymphedema affects my socializing with others. 1  -JS      Lymphedema affects my intimate relations with spouse or partner (rate 0 if not applicable 1  -JS      Lymphedema \"gets me down\" (i.e., " depression, frustration, or anger) 2  -JS      I must rely on others for help due to my lymphedema. 2  -JS      I know what to do to manage my lymphedema 1  -JS         LLIS - Functional Concerns    Lymphedema affects my ability to perform self-care activities (i.e. eating, dressing, hygiene) 3  -JS      Lymphedema affects my ability to perform routine home or work-related activities. 2  -JS      Lymphedema affects my performance of preferred leisure activities. 4  -JS      Lymphedema affects proper fit of clothing/shoes 4  -JS      Lymphedema affects my sleep 4  -JS         Posture/Observations    Alignment Options Thoracic kyphosis;Forward head  -JS         General ROM    GENERAL ROM COMMENTS Impaired ROM BLE grossly.  -JS         MMT (Manual Muscle Testing)    General MMT Comments 3-/5 to 2+/5 MMT BLE grossly  -JS         Hand  Strength     Strength Affected Side Right  -JS         Lymphedema Edema Assessment    Ptting Edema Category By grade out of 3  -JS      Pitting Edema + 2/3  -JS      [place] Sign negative;bilateral:  -JS      [place] Hump negative  -JS         Skin Changes/Observations    Location/Assessment Lower Extremity  -JS      Lower Extremity Conditions right:;left:;bilateral:;shiny;weeping;fragile  -JS      Lower Extremity Color/Pigment hyperpigmented;erythema;P'eau d'orange;bilateral:  -JS      Lower Extremity Skin Details Patient presents with significant skin fragility and compromised integrity. There are five open wounds on the right anterior leg and four on the left anterior leg, all of which are dressed with Hydrofera Blue. The anterior aspects of both legs demonstrate erythema, weeping, and peau d’orange (orange peel) texture. The left foot dorsum has a large ruptured blister. Overall, the skin shows signs of inflammation and high lymphatic load, but there is no active sign of infection at this time.  -JS         Lymphedema Sensation    Lymphedema Sensation Reports  numbness;LLE:;tingling  -JS      Lymphedema Sensation Tests sharp/dull  -JS      Lymphedema Sharp/Dull mild impairment  -JS         Lymphedema Pulses/Capillary Refill    Lymphedema Pulses/Capillary Refill capillary refill;lower extremity pulses  -JS      Dorsalis Pedis Pulse +1 diminished  -JS      Posterior Tibialis Pulse +2 normal  -JS      Capillary Refill lower extremity capillary refill  -JS      Lower Extremity Capillary Refill left:;right:;less than 3 seconds  -JS         Lymphedema Measurements    Measurement Type(s) Circumferential  -JS      Circumferential Areas Lower extremities  -JS         Lymphedema Life Impact Scale Totals    A.  Total Q1 - Q17 (Do not include Q18) 45  -JS      B.  Total number of questions answered (Q1-Q17) 17  -JS      C. Divide A by B 2.65  -JS      D. Multiple C by 25 66.25  -JS                User Key  (r) = Recorded By, (t) = Taken By, (c) = Cosigned By      Initials Name Provider Type    Alex Fernandez OT Occupational Therapist                                          OT Assessment/Plan       Row Name 06/05/25 1600          OT Assessment    Functional Limitations Impaired gait;Decreased safety during functional activities;Impaired locomotion  -JS     Impairments Edema;Impaired lymphatic circulation  -JS     Assessment Comments Jonas Mercado presents with  lower extremity lymphedema complicated by multiple comorbid conditions, including diabetes and chronic wounds. The presence of weeping, blistering, and erythema indicates elevated lymphatic stasis and compromised skin function. The patient demonstrates good insight into his condition and is motivated to participate in therapy. Continued skilled OT is warranted to address swelling reduction, wound care, skin integrity, and education to prevent further deterioration and improve function.  -JS     OT Diagnosis Lymphedema with associated chronic bilateral lower extremity wounds and skin fragility secondary to multiple  comorbidities.  -JS     OT Rehab Potential Good  -JS     Patient/caregiver participated in establishment of treatment plan and goals Yes  -JS     Patient would benefit from skilled therapy intervention Yes  -JS        OT Plan    OT Frequency 2x/week  -JS     Predicted Duration of Therapy Intervention (OT) 12 weeks  -JS     Planned CPT's? OT EVAL MOD COMPLEXITY: 19202;OT SELF CARE/MGMT/TRAIN 15 MIN: 84334;OT ORTHOTIC MGMT/TRAIN EA 15 MIN: 72872;OT MANUAL THERAPY EA 15 MIN: 51340  -JS     Planned Therapy Interventions (Optional Details) wound care  -JS     OT Plan Comments Continue POC  -JS               User Key  (r) = Recorded By, (t) = Taken By, (c) = Cosigned By      Initials Name Provider Type    Alex Fernandez OT Occupational Therapist                Circumferential Measurements         Baseline: R: 2165.25 ml L: 1885.02 ml  % volume change: R: --%   L: -- %      Short-Term: Patient will achieve a 5% reduction in limb volume in the affected area as measured by circumferential measurements or perometry, within 30 days, to support improved lymphatic drainage. Initial: Ongoing  Long-Term: Patient will achieve a 10% reduction in limb volume in the affected area as measured by circumferential measurements or perometry, within 90 days to promote optimal functional outcomes. Initial: Ongoing  TREATMENT:  Manual therapy, therapeutic exercise, therapeutic activity, ADL retraining, Patient/caregiver education, Modalities: TENS, NMES, Ultrasound, Fluidotherapy Orthotic Management and Training, Wound dressing as needed.       Short-Term: Patient will verbalize understanding of the principles of self-MLD and accurately demonstrate basic self-MLD techniques with minimal cues in 3 out of 3 sessions within 30 days. Initial: Ongoing  Long-Term: Patient will independently perform self-MLD techniques daily and report 90% adherence as documented in a self-care log over a 2-week period within 90 days.  Initial: Ongoing  TREATMENT:   Manual therapy, therapeutic exercise, therapeutic activity, ADL retraining, Patient/caregiver education, Modalities: TENS, NMES, Ultrasound, Fluidotherapy Orthotic Management and Training, Wound dressing as needed.    Short-Term: Patient will don compression garments independently or with minimal assistance in 3 out of 5 observed trials within 30 days to promote adherence to lymphedema management.  Initial: Ongoing  Long-Term: Patient will independently don and doff compression garments daily with correct technique and report 100% compliance over a 2-week period within 90 days.  Initial: Ongoing  TREATMENT:  Manual therapy, therapeutic exercise, therapeutic activity, ADL retraining, Patient/caregiver education, Modalities: TENS, NMES, Ultrasound, Fluidotherapy Orthotic Management and Training, Wound dressing as needed.    Short-Term: Therapist will assess and ensure proper fit of compression garments and make necessary modifications or referrals within the first 30 days of treatment.  Initial: Ongoing  Long-Term: Therapist will reassess garment fit and function and provide modifications or replacement recommendations as needed within the 90-day treatment period.  Initial: Ongoing  TREATMENT:  Manual therapy, therapeutic exercise, therapeutic activity, ADL retraining, Patient/caregiver education, Modalities: TENS, NMES, Ultrasound, Fluidotherapy Orthotic Management and Training, Wound dressing as needed.    Short-Term: Patient will identify at least 3 key components of lymphedema skin care and demonstrate proper daily hygiene and moisturizing routine in 2 out of 3 sessions within 30 days.  Initial: Ongoing  Long-Term: Patient will consistently implement a skin care routine including inspection, hygiene, and moisturizing with no reported skin breakdown over 90 days.  Initial: Ongoing  TREATMENT:  Manual therapy, therapeutic exercise, therapeutic activity, ADL retraining, Patient/caregiver education, Modalities: TENS,  NMES, Ultrasound, Fluidotherapy Orthotic Management and Training, Wound dressing as needed.    Short-Term: Patient will demonstrate prescribed home exercise program (HEP) and lymphatic/venous flow exercises with correct technique in 2 out of 3 sessions within 30 days.  Initial: Ongoing  Long-Term: Patient will demonstrate full HEP and lymphatic/venous flow exercises independently and report completing the routine at least 5 days per week over the previous 2 weeks within 90 days.  Initial: Ongoing  TREATMENT:  Manual therapy, therapeutic exercise, therapeutic activity, ADL retraining, Patient/caregiver education, Modalities: TENS, NMES, Ultrasound, Fluidotherapy Orthotic Management and Training, Wound dressing as needed.    Short-Term:  Patient will demonstrate improved quality of life by achieving a 1-4 point reduction in total score on the Lymphedema Life Impact Scale within 30 days, indicating decreased physical, emotional, and functional impact of lymphedema.  Initial: Ongoing  Long-Term:  Patient will demonstrate sustained improvement in quality of life by achieving a 3-5 point reduction in total score on the Lymphedema Life Impact Scale within 90 days, reflecting enhanced self-management and reduced impact of symptoms on daily functioning.  Initial: Ongoing  TREATMENT:  Manual therapy, therapeutic exercise, therapeutic activity, ADL retraining, Patient/caregiver education, Modalities: TENS, NMES, Ultrasound, Fluidotherapy Orthotic Management and Training, Wound dressing as needed.         Time Calculation:         Therapy Charges for Today       Code Description Service Date Service Provider Modifiers Qty    13273615137  OT EVAL MOD COMPLEXITY 2 6/5/2025 Alex Cardenas OT GO 1                      Alex Cardenas OT  6/5/2025

## 2025-06-11 ENCOUNTER — TELEPHONE (OUTPATIENT)
Dept: UROLOGY | Age: 75
End: 2025-06-11
Payer: MEDICARE

## 2025-06-11 NOTE — TELEPHONE ENCOUNTER
I spoke with the patient and added him on the nurse schedule for next week. We will not have a RN in [place] for a few more weeks.

## 2025-06-11 NOTE — TELEPHONE ENCOUNTER
Patient had PTNS on 5/23 in [place]. He has not been scheduled for his next treatment, states he was told that he would be able to get it in [place]. When/where does he need to be scheduled. If he has to come to [facility] he would prefer it on the 17th or 20th as he already has to be in [facility] for other appt. Please let him know.

## 2025-06-12 ENCOUNTER — HOSPITAL ENCOUNTER (OUTPATIENT)
Dept: OCCUPATIONAL THERAPY | Facility: HOSPITAL | Age: 75
Setting detail: THERAPIES SERIES
Discharge: HOME | End: 2025-06-12
Payer: MEDICARE

## 2025-06-12 DIAGNOSIS — M62.81 GENERALIZED MUSCLE WEAKNESS: ICD-10-CM

## 2025-06-12 DIAGNOSIS — R26.2 DIFFICULTY IN WALKING, NOT ELSEWHERE CLASSIFIED: ICD-10-CM

## 2025-06-12 DIAGNOSIS — I89.0 LYMPHEDEMA: Primary | ICD-10-CM

## 2025-06-12 PROCEDURE — 97535 SELF CARE MNGMENT TRAINING: CPT

## 2025-06-12 PROCEDURE — 97140 MANUAL THERAPY 1/> REGIONS: CPT

## 2025-06-12 NOTE — THERAPY TREATMENT NOTE
"Outpatient Occupational Therapy Lymphedema Treatment Note   Arnaldo     Patient Name: Jonas Mercado  : 1950  MRN: [id number]  Today's Date: 2025      Visit Date: 2025    Patient Active Problem List   Diagnosis    Lymphedema    Hyperlipemia    Hypertension    Temporal arteritis    Bilateral primary osteoarthritis of knee    BPH (benign prostatic hyperplasia)    Lumbar adjacent segment disease with spondylolisthesis    Spinal stenosis    Vitamin D deficiency    Major depressive disorder, recurrent, moderate    Type 2 diabetes mellitus without complication    Chronic diastolic (congestive) heart failure    Constipation, unspecified    Difficulty in walking, not elsewhere classified    Generalized muscle weakness    KARLA (obstructive sleep apnea)        Past Medical History:   Diagnosis Date    Arthritis     Back pain     Bilateral ankle pain     NEUROPATHY    Corns and callus     Coronary artery disease     \"STIFF HEART\" ON BB, NO CARDIOLOGIST, PCP (-)CP OR SOA    Diabetes mellitus type 2, controlled     BG RUNS IN 'S IN AM    Hammertoe     Heel pain     Hyperlipemia     Hypertension     Left foot pain     Lymphedema     NO PROBLEMS LATELY    Numbness in feet     Pulmonary embolism 2013    SADDLE PE, RESOLVED        Past Surgical History:   Procedure Laterality Date    BACK SURGERY  2021    S1-L1 FUSION, CYST REMOVED (TOTAL OF 3 SURGERY  TO )    CARPAL TUNNEL RELEASE Right     COLONOSCOPY      CYST REMOVAL      BACK    FOOT SURGERY Right     ACHELLIS TENDON/HAMMER TOE    HYDROCELECTOMY Left 2023    Procedure: HYDROCELECTOMY, left;  Surgeon: Nicky Robertson MD;  Location: [facility] MAIN OR;  Service: Urology;  Laterality: Left;    KNEE ARTHROSCOPY Right     DIAGNOSTIC    TEMPORAL ARTERY BIOPSY Right 2022    Procedure: Right temporal artery biopsy;  Surgeon: Wilbert Jacob MD;  Location: [facility] MAIN OR;  Service: Vascular;  Laterality: Right;         Visit " Dx:      ICD-10-CM ICD-9-CM   1. Lymphedema  I89.0 457.1   2. Difficulty in walking, not elsewhere classified  R26.2 719.7   3. Generalized muscle weakness  M62.81 728.87        Lymphedema       Row Name 06/12/25 1300             Subjective Pain    Able to rate subjective pain? yes  -SC      Pre-Treatment Pain Level 2  -SC      Post-Treatment Pain Level 2  -SC      Subjective Pain Comment General pain bilaterally  -SC         Subjective    Subjective Comments Pt is eager to begin treatment  -SC         Skin Changes/Observations    Location/Assessment Lower Extremity  -SC      Lower Extremity Conditions right:;left:;bilateral:;shiny;weeping;fragile  -SC      Lower Extremity Color/Pigment hyperpigmented;erythema;P'eau d'orange;bilateral:  -SC      Lower Extremity Skin Details Multiple wounds bilateral anterior LE, right greater than left noted with beefy red tissue and fragile bleeding vessels. No infections suspected  -SC         Lymphedema Measurements    Measurement Type(s) Circumferential  -SC      Circumferential Areas Lower extremities  -SC      Lymphedema Measurements Comments New baseline set secondary to error at evaluation  -SC         Compression/Skin Care    Compression/Skin Care skin care;wrapping location;bandaging  -SC      Skin Care moisturizing lotion applied  -SC      Wrapping Location lower extremity  -SC      Wrapping Location LE bilateral:;foot to knee;toes  -SC      Bandage Layers cotton liner;padding/fluff layer;soft foam- 1/4 inch;short-stretch bandages (comment size/quantity)  [place]  -SC      Bandaging Comments 1/4 inch foam applied to circumference of B LE/ankle to build shape and improve comfort  -SC      Bandaging Technique circumferential/spiral;strong compression  -SC      Compression/Skin Care Comments Therapist cleaning wounds with sterile saline solution, applying [employer] and ABD pad prior to compression wrapping  -SC                User Key  (r) = Recorded By, (t) = Taken  By, (c) = Cosigned By      Initials Name Provider Type    Vale Montgomery COTA Occupational Therapist Assistant                Circumferential Measurements       Baseline: R: 2680.84 ml L: 2562.07 ml  % volume change: R: --%   L: -- %               OT Assessment/Plan       Row Name 06/12/25 1438          OT Assessment    Assessment Comments Pt will benefit from continued OT skilled services until pt has reached plateau and is independent with complete decongestive therapy  -SC     Patient would benefit from skilled therapy intervention Yes  -SC        OT Plan    OT Plan Comments Continue POC  -SC               User Key  (r) = Recorded By, (t) = Taken By, (c) = Cosigned By      Initials Name Provider Type    Vale Montgomery COTA Occupational Therapist Assistant                        Manual Rx (Last 36 Hours)       Manual Treatments       Row Name 06/12/25 1438             Total Minutes    97091 - OT Manual Therapy Minutes 53  -SC                User Key  (r) = Recorded By, (t) = Taken By, (c) = Cosigned By      Initials Name Provider Type    Vale Montgomery COTA Occupational Therapist Assistant                      Therapy Education  Given: Symptoms/condition management  Program: New  How Provided: Verbal  Provided to: Patient  Level of Understanding: Verbalized  28270 - OT Self Care/Mgmt Minutes: 10  Therapist educating pt on complete decongestive therapy and the process of OT therapy services. Therapist explaining to pt that once pt has reached a plateau in lymph volume reduction therapist work with a separate company to obtain permanent garments. Therapist discussing with pt regarding therapist requesting benefit checks from said company for garment coverage. Therapist recommending pt don compression wrapping for at least 24 hours and longer if possible. Therapist explaining to pt the longer he remains in compression wrapping the quicker pt can reach plateau and work towards obtaining permanent  garments. Therapist explaining to bring compression back and will be reused. Pt will end up with a second set for wash and wear.  Pt educated on signs and symptoms of infection. Therapist urging pt to seek doctor evaluation if signs and symptoms occur and to remove any compression. Therapist educating pt regarding pt must be on antibiotic for 72 hours if there is an infection prior to continuing compression to prevent the spread of infection.  Therapist educating pt on perform exercises if burning, tingling, or pain is experienced while donning compression garments. If sensations do not subside after approximately 10-15 min to remove wraps.  Therapist also educating patient on the possibility of calf cramping.  Secondary to muscles being in compression and having to work a little harder for movement patient may experience minor calf cramping and should subside within a day or two.                Time Calculation:   Timed Charges  65240 - OT Manual Therapy Minutes: 53  79321 - OT Self Care/Mgmt Minutes: 10  Total Minutes  Timed Charges Total Minutes: 63   Total Minutes: 63     Therapy Charges for Today       Code Description Service Date Service Provider Modifiers Qty    99776230170 HC OT MANUAL THERAPY EA 15 MIN 6/12/2025 Vale Nixon COTA GO 3    [id number] HC OT SELF CARE/MGMT/TRAIN EA 15 MIN 6/12/2025 Vale Nixon COTA GO 1                        LILO Tang  6/12/2025

## 2025-06-17 ENCOUNTER — HOSPITAL ENCOUNTER (OUTPATIENT)
Dept: OCCUPATIONAL THERAPY | Facility: HOSPITAL | Age: 75
Setting detail: THERAPIES SERIES
Discharge: HOME | End: 2025-06-17
Payer: MEDICARE

## 2025-06-17 ENCOUNTER — CLINICAL SUPPORT (OUTPATIENT)
Dept: UROLOGY | Age: 75
End: 2025-06-17
Payer: MEDICARE

## 2025-06-17 DIAGNOSIS — R26.2 DIFFICULTY IN WALKING, NOT ELSEWHERE CLASSIFIED: ICD-10-CM

## 2025-06-17 DIAGNOSIS — I89.0 LYMPHEDEMA: Primary | ICD-10-CM

## 2025-06-17 DIAGNOSIS — R32 URINARY INCONTINENCE, UNSPECIFIED TYPE: ICD-10-CM

## 2025-06-17 DIAGNOSIS — N32.81 OVERACTIVE BLADDER: Primary | ICD-10-CM

## 2025-06-17 DIAGNOSIS — M62.81 GENERALIZED MUSCLE WEAKNESS: ICD-10-CM

## 2025-06-17 PROCEDURE — 97535 SELF CARE MNGMENT TRAINING: CPT

## 2025-06-17 PROCEDURE — 97140 MANUAL THERAPY 1/> REGIONS: CPT

## 2025-06-17 NOTE — PROGRESS NOTES
Procedure   Percutaneous Nerve Evaluation    Date/Time: 6/17/2025 10:13 AM    Performed by: Junie Carlton RN  Authorized by: Anya Cordon APRN  Local anesthesia used: no    Anesthesia:  Local anesthesia used: no    Sedation:  Patient sedated: no    Patient tolerance: patient tolerated the procedure well with no immediate complications  Comments: Percutaneous Tibial Nerve Stimulation monthly .  The patient was properly identified in position for the procedure sitting comfortably in a chair with the right leg and foot exposed.  The NURO device was prepared.  Therapy session kit opened and the needle espitia and ground pad attached to the device.  The ground pad placement site was determined and cleaned and dried.  The adhesive pad was removed from the ground pad and the device was placed on the patient within reach of the needle insertion site.     An alcohol pad was used to clean and dry the needle insertion site area.  The needle insertion site was located on the inner aspect of the right leg and approximately 3 finger breadths up from the medial malleolus and approximately 1 fingerbreadth posterior from the tibia.  The sterile needle package was opened the needle/guide to assembled and positioned over the identified and cleaned insertion site of the 60 degree angle between the shaft of the needle and skin.  The needle tip pointed cephalad.     The stop plug was removed to release the needle.  The needle head was gently tapped on to devine the skin.  Once the needle pierced the skin, the guide tube was removed.  The needle was advanced using a rotating motion to facilitate entry until approximately half of the needle was inserted into the leg.  The plunger on the needle espitia was depressed to expose the J hook.  The J hook was looped around the needle, close to the skin without pinching the skin.     The NURO device was turned on and the power button was pressed again to advance the stimulation level  adjustment screen.  The stimulation was increased until the patient elicited a motor and sensory response.  The sensory response was tingling across the bottom of the foot/heel.  The motor response was toe flexion.  Once the appropriate responses were observed, therapy was started by pressing the start button.  The patient received 30-minute therapy session.     Once therapy session was complete.  The device was turned off by holding down the power button.  The needle espitia was detached from the needle.  The needle was removed from the leg using a smooth fluid motion and the neuro device and ground pad removed from the patient.  Patient tolerated procedure well.      This procedure was done under my direct supervision. I was in the immediate area for the entire portion of the procedure.

## 2025-06-17 NOTE — THERAPY TREATMENT NOTE
"Outpatient Occupational Therapy Lymphedema Treatment Note   [facility]     Patient Name: Jonas Mercado  : 1950  MRN: [id number]  Today's Date: 2025      Visit Date: 2025    Patient Active Problem List   Diagnosis    Lymphedema    Hyperlipemia    Hypertension    Temporal arteritis    Bilateral primary osteoarthritis of knee    BPH (benign prostatic hyperplasia)    Lumbar adjacent segment disease with spondylolisthesis    Spinal stenosis    Vitamin D deficiency    Major depressive disorder, recurrent, moderate    Type 2 diabetes mellitus without complication    Chronic diastolic (congestive) heart failure    Constipation, unspecified    Difficulty in walking, not elsewhere classified    Generalized muscle weakness    KARLA (obstructive sleep apnea)        Past Medical History:   Diagnosis Date    Arthritis     Back pain     Bilateral ankle pain     NEUROPATHY    Corns and callus     Coronary artery disease     \"STIFF HEART\" ON BB, NO CARDIOLOGIST, PCP (-)CP OR SOA    Diabetes mellitus type 2, controlled     BG RUNS IN 'S IN AM    Hammertoe     Heel pain     Hyperlipemia     Hypertension     Left foot pain     Lymphedema     NO PROBLEMS LATELY    Numbness in feet     Pulmonary embolism 2013    SADDLE PE, RESOLVED        Past Surgical History:   Procedure Laterality Date    BACK SURGERY  2021    S1-L1 FUSION, CYST REMOVED (TOTAL OF 3 SURGERY  TO )    CARPAL TUNNEL RELEASE Right     COLONOSCOPY      CYST REMOVAL      BACK    FOOT SURGERY Right     ACHELLIS TENDON/HAMMER TOE    HYDROCELECTOMY Left 2023    Procedure: HYDROCELECTOMY, left;  Surgeon: Nicky Robertson MD;  Location: [facility] MAIN OR;  Service: Urology;  Laterality: Left;    KNEE ARTHROSCOPY Right     DIAGNOSTIC    TEMPORAL ARTERY BIOPSY Right 2022    Procedure: Right temporal artery biopsy;  Surgeon: Wilbert Jacob MD;  Location: [facility] MAIN OR;  Service: Vascular;  Laterality: Right;         Visit " Dx:      ICD-10-CM ICD-9-CM   1. Lymphedema  I89.0 457.1   2. Difficulty in walking, not elsewhere classified  R26.2 719.7   3. Generalized muscle weakness  M62.81 728.87        Lymphedema       Row Name 06/17/25 1500             Subjective Pain    Able to rate subjective pain? yes  -SC      Pre-Treatment Pain Level 0  -SC      Post-Treatment Pain Level 0  -SC      Subjective Pain Comment Pt denies pain  -SC         Subjective    Subjective Comments Pt states he had to have part of the R LE wrapping removed today at a doctors appointment however has had no issues with the wraps  -SC         Skin Changes/Observations    Location/Assessment Lower Extremity  -SC      Lower Extremity Conditions right:;left:;bilateral:;shiny;weeping;fragile  -SC      Lower Extremity Color/Pigment hyperpigmented;erythema;P'eau d'orange;bilateral:  -SC      Lower Extremity Skin Details Wounds noted on B anterior R worse than L. Stage II wound noted on dorsum of R foot. All wounds noted with beefy red granulating tissue and active bleeding after dressing removal. Wound on distal anterior R LE noted with significant slough.  -SC      Skin Observations Comment Pt arriving donning compression wrapping from previous appointment  -SC         Compression/Skin Care    Compression/Skin Care skin care;remove bandages  -SC      Skin Care washed/dried;other (comment)  -SC      Compression/Skin Care Comments Therapist removing compression wrapping and cleaning B LE with sterile saline solution and applying ABD pad and matrix wrap to keep wounds covered due to pt having new wound care appointment tomorrow morning.  -SC                User Key  (r) = Recorded By, (t) = Taken By, (c) = Cosigned By      Initials Name Provider Type    SC Vale Nixon COTA Occupational Therapist Assistant                             OT Assessment/Plan       Row Name 06/17/25 1532          OT Assessment    Assessment Comments Pt has significant wounds bilaterally however  has already presented with decreased lymph volume. Pt will benefit from continued OT skilled services until pt wounds have healed, reached plateau in lymph volume, received peramenent compression garments, and is demonstrating independence/modified independence with complete decongestive therapy with assistance from spouse.  -SC     Patient would benefit from skilled therapy intervention Yes  -SC        OT Plan    OT Plan Comments Continue POC  -SC               User Key  (r) = Recorded By, (t) = Taken By, (c) = Cosigned By      Initials Name Provider Type    Vale Montgomery COTA Occupational Therapist Assistant                        Manual Rx (Last 36 Hours)       Manual Treatments       Row Name 06/17/25 1537             Total Minutes    05707 - OT Manual Therapy Minutes 30  -SC                User Key  (r) = Recorded By, (t) = Taken By, (c) = Cosigned By      Initials Name Provider Type    Vale Montgomery COTA Occupational Therapist Assistant                      Therapy Education  Education Details: Therapist did contact pt PCP to request referral to be placed to [facility] for MD evaluation. Therapist then assisted pt in making wound care appointment for tomorrow to have wounds attended to.  Given: Symptoms/condition management  Program: New  How Provided: Verbal  Provided to: Patient  Level of Understanding: Verbalized  29758 - OT Self Care/Mgmt Minutes: 34                Time Calculation:   Timed Charges  97366 - OT Manual Therapy Minutes: 30  11957 - OT Self Care/Mgmt Minutes: 34  Total Minutes  Timed Charges Total Minutes: 64   Total Minutes: 64     Therapy Charges for Today       Code Description Service Date Service Provider Modifiers Qty    95946663662 HC OT MANUAL THERAPY EA 15 MIN 6/17/2025 Vale Nixon COTA GO 2    16788182662 HC OT SELF CARE/MGMT/TRAIN EA 15 MIN 6/17/2025 Vale Nixon COTA GO 2                        LILO Tang  6/17/2025

## 2025-06-18 ENCOUNTER — OFFICE VISIT (OUTPATIENT)
Dept: WOUND CARE | Facility: HOSPITAL | Age: 75
End: 2025-06-18
Payer: MEDICARE

## 2025-06-18 ENCOUNTER — APPOINTMENT (OUTPATIENT)
Dept: CT IMAGING | Facility: HOSPITAL | Age: 75
End: 2025-06-18
Payer: MEDICARE

## 2025-06-18 ENCOUNTER — HOSPITAL ENCOUNTER (EMERGENCY)
Facility: HOSPITAL | Age: 75
Discharge: HOME | End: 2025-06-18
Attending: EMERGENCY MEDICINE
Payer: MEDICARE

## 2025-06-18 VITALS
TEMPERATURE: 97.5 F | DIASTOLIC BLOOD PRESSURE: 57 MMHG | HEART RATE: 64 BPM | SYSTOLIC BLOOD PRESSURE: 100 MMHG | RESPIRATION RATE: 18 BRPM

## 2025-06-18 VITALS
HEART RATE: 81 BPM | HEIGHT: 72 IN | BODY MASS INDEX: 30.76 KG/M2 | DIASTOLIC BLOOD PRESSURE: 80 MMHG | RESPIRATION RATE: 17 BRPM | TEMPERATURE: 97.8 F | OXYGEN SATURATION: 94 % | WEIGHT: 227.07 LBS | SYSTOLIC BLOOD PRESSURE: 119 MMHG

## 2025-06-18 DIAGNOSIS — S81.801A TRAUMATIC OPEN WOUND OF RIGHT LOWER LEG, INITIAL ENCOUNTER: ICD-10-CM

## 2025-06-18 DIAGNOSIS — S80.812A: ICD-10-CM

## 2025-06-18 DIAGNOSIS — S61.411A SKIN TEAR OF RIGHT HAND WITHOUT COMPLICATION, INITIAL ENCOUNTER: ICD-10-CM

## 2025-06-18 DIAGNOSIS — S00.83XA CONTUSION OF FOREHEAD, INITIAL ENCOUNTER: ICD-10-CM

## 2025-06-18 DIAGNOSIS — S00.81XA ABRASION OF FOREHEAD, INITIAL ENCOUNTER: ICD-10-CM

## 2025-06-18 DIAGNOSIS — S01.111A LACERATION OF RIGHT EYEBROW, INITIAL ENCOUNTER: Primary | ICD-10-CM
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
## 2025-06-18 DIAGNOSIS — S81.802A TRAUMATIC OPEN WOUND OF LEFT LOWER LEG, INITIAL ENCOUNTER: ICD-10-CM

## 2025-06-18 DIAGNOSIS — S01.111A LACERATION OF RIGHT EYEBROW, INITIAL ENCOUNTER: ICD-10-CM

## 2025-06-18 DIAGNOSIS — S05.11XA PERIORBITAL CONTUSION OF RIGHT EYE, INITIAL ENCOUNTER: ICD-10-CM

## 2025-06-18 DIAGNOSIS — S81.001A OPEN WOUND OF RIGHT KNEE, INITIAL ENCOUNTER: ICD-10-CM

## 2025-06-18 DIAGNOSIS — S51.811A: ICD-10-CM
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
## 2025-06-18 DIAGNOSIS — R60.0 EDEMA OF BOTH LOWER EXTREMITIES: ICD-10-CM

## 2025-06-18 PROCEDURE — 25010000002 LIDOCAINE 1% - EPINEPHRINE 1:100000 1 %-1:100000 SOLUTION

## 2025-06-18 PROCEDURE — 3078F DIAST BP <80 MM HG: CPT | Performed by: EMERGENCY MEDICINE

## 2025-06-18 PROCEDURE — 70450 CT HEAD/BRAIN W/O DYE: CPT

## 2025-06-18 PROCEDURE — 1159F MED LIST DOCD IN RCRD: CPT | Performed by: EMERGENCY MEDICINE

## 2025-06-18 PROCEDURE — 99284 EMERGENCY DEPT VISIT MOD MDM: CPT

## 2025-06-18 PROCEDURE — 1160F RVW MEDS BY RX/DR IN RCRD: CPT | Performed by: EMERGENCY MEDICINE

## 2025-06-18 PROCEDURE — 70486 CT MAXILLOFACIAL W/O DYE: CPT

## 2025-06-18 PROCEDURE — G0463 HOSPITAL OUTPT CLINIC VISIT: HCPCS | Performed by: EMERGENCY MEDICINE

## 2025-06-18 PROCEDURE — 3074F SYST BP LT 130 MM HG: CPT | Performed by: EMERGENCY MEDICINE

## 2025-06-18 RX ORDER — LIDOCAINE HYDROCHLORIDE AND EPINEPHRINE 10; 10 MG/ML; UG/ML
10 INJECTION, SOLUTION INFILTRATION; PERINEURAL ONCE
Status: COMPLETED | OUTPATIENT
Start: 2025-06-18 | End: 2025-06-18

## 2025-06-18 RX ADMIN — LIDOCAINE HYDROCHLORIDE,EPINEPHRINE BITARTRATE 10 ML: 10; .01 INJECTION, SOLUTION INFILTRATION; PERINEURAL at 14:34

## 2025-06-18 NOTE — DISCHARGE INSTRUCTIONS
Wash wound with soap and water.  Pat dry.  Sutures need to be removed in 5 to 7 days.  Apply cold compresses to any soft tissue swelling.  Apply for 20 minutes 3-5 times per day.  Do not apply ice directly to the skin.  Monitor your skin tears for any signs of infection.  Follow-up with your primary care provider in 1 week.  Return to the ER for any concerns for infection, increasing pain, or any other concerns issues that may arise.

## 2025-06-18 NOTE — PROGRESS NOTES
Wound Location:Right knee     Wound Exudate:moderate  Wound Debridement:Mechanically debrided with normal saline & gauze  Wound Thickness: full    Cleanse with:  NS or antibacterial soap and water    Primary: bordered dressing     Raad-wound: moisturizer    Instructions: Cleanse wound with ns or antibacterial soap and water, pat dry, apply bacitracin to wound base and cover with dry bordered dressing. Apply moisturizer to raad-wound daily.     Wound Location:Left knee     Wound Exudate:moderate  Wound Debridement:Mechanically debrided with normal saline & gauze  Wound Thickness: full    Cleanse with:  NS or antibacterial soap and water    Primary: bordered dressing     Raad-wound: moisturizer    Instructions: Cleanse wound with ns or antibacterial soap and water, pat dry, apply bacitracin to wound base and cover with dry bordered dressing. Apply moisturizer to raad-wound daily.     Wound Location: Right Arm     Wound Exudate: small   Wound Debridement: Mechanically debrided with normal saline & gauze  Wound Thickness: full    Cleanse with:  NS or antibacterial soap and water    Primary: Vaseline gauze  Secondary: rolled gauze  Secured with: tape  Raad-wound: dry    Instructions: Cleanse wound with ns or antibacterial soap and water, pat dry, apply Vaseline gauze to wound base, secure in place with rolled gauze and tape. Change daily      Wound Location: Right Anterior Leg    Wound Exudate: moderate  Wound Debridement: Mechanically debrided with normal saline & gauze  Wound Thickness: full    Cleanse with:  NS or antibacterial soap and water    Primary: Aquacel AG  Secondary: gauze  Secured with:rolled gauze & elastic wrap  Raad-wound: Moisturizer    Instructions: Cleanse wound with ns or antibacterial soap and water, pat dry, apply Aquacel Ag to wound base, apply moisturizer to raad-wound, wrap lower extremity with rolled gauze & elastic wrap from base of toes to just below the knee. Change daily.     Wound  Location: Right Distal Leg    Wound Exudate: moderate  Wound Debridement: Mechanically debrided with normal saline & gauze  Wound Thickness: full    Cleanse with:  NS or antibacterial soap and water    Primary: Aquacel AG  Secondary: gauze  Secured with:rolled gauze & elastic wrap  Raad-wound: Moisturizer    Instructions: Cleanse wound with ns or antibacterial soap and water, pat dry, apply Aquacel Ag to wound base, apply moisturizer to raad-wound, wrap lower extremity with rolled gauze & elastic wrap from base of toes to just below the knee. Change daily.     Wound Location: Right Foot    Wound Exudate: moderate  Wound Debridement: Mechanically debrided with normal saline & gauze  Wound Thickness: full    Cleanse with:  NS or antibacterial soap and water    Primary: Aquacel AG  Secondary: gauze  Secured with:rolled gauze & elastic wrap  Raad-wound: Moisturizer    Instructions: Cleanse wound with ns or antibacterial soap and water, pat dry, apply Aquacel Ag to wound base, apply moisturizer to raad-wound, wrap lower extremity with rolled gauze & elastic wrap from base of toes to just below the knee. Change daily.     Wound Location: Left leg    Wound Exudate: small  Wound Debridement: Mechanically debrided with normal saline & gauze  Wound Thickness: full     Cleanse with:  NS or antibacterial soap and water    Primary: rolled gauze  Secured with: rolled gauze and elastic wrap  Raad-wound: moisturizer    Instructions: Cleanse wound with ns or antibacterial soap and water, pat dry, apply moisturizer to wound base & raad-wound  and cover with rolled gauze, tape and elastic wrap. Change daily.    Wound Location: Left third toe     Wound Exudate: small  Wound Debridement: Mechanically debrided with normal saline & gauze  Wound Thickness: full     Cleanse with:  NS or antibacterial soap and water    Primary: gauze    Raad-wound: betadine    Instructions: Cleanse wound with ns or antibacterial soap and water, pat dry,  paint wound base & raad-wound with betadine and cover with gauze and tape. Change daily.     Wound Location: Left Great toe     Wound Exudate: small  Wound Debridement: Mechanically debrided with normal saline & gauze  Wound Thickness: full     Cleanse with:  NS or antibacterial soap and water    Primary: gauze    Raad-wound: betadine    Instructions: Cleanse wound with ns or antibacterial soap and water, pat dry, paint wound base & raad-wound with betadine and cover with gauze and tape. Change daily.    Wound Location: Right Eye    Wound Exudate: small  Wound Debridement: Mechanically debrided with normal saline & gauze  Wound Thickness: full    Cleanse with:  NS or antibacterial soap and water    Primary: Vaseline Gauze  Secondary: gauze  Secured with:tubular wrap   Raad-wound: dry    Instructions: Cleanse wound with ns, pat dry, place Vaseline on wound base cover with gauze & secure with tubular wrap.     Education: Educated patient/family member/CG on how to apply new dressings, verbal understanding provided. Patient/family member/CG instructed to call with any questions or concerns

## 2025-06-18 NOTE — PROGRESS NOTES
Chief Complaint  Wound Check (New pt, her today for assessment to BLE, Pt fell in waiting room on the way up to appointment, pt brought in exam room and evaluated - Last hgbA1c 6.4)    Subjective      History of Present Illness    Jonas Mercado  is a 74 y.o. male     History of Present Illness  The patient is a 74-year-old male here for evaluation of bilateral lower leg wounds.  Unfortunately, the patient sustained a fall while coming into the building.  He said someone started speaking and he thought they were talking to him so he turned his head and subsequently lost his balance and fell forward striking his right forehead and eyebrow and sustaining a large laceration to his right forearm.    He sustained injuries to his right leg approximately 3 weeks ago, one from leaning over to retrieve an item from his car and the other from entering his pickup truck. Additionally, he noticed a blister on the top of his right foot upon waking one morning.  His legs were tremendously swollen at the time but he has recently started going to lymphedema clinic and the swelling is improving significantly now.  He has been under the care of a podiatrist for his toes, who prescribed an antibiotic. His feet and legs were significantly swollen, a condition attributed to CHF and lymphedema. He has been applying Betadine and a triple antibiotic ointment to the affected areas. Typically, he says he heals rapidly, but the current healing process has been slower than usual. He has been attending the lymphedema clinic twice weekly since the previous Monday.    He also reports a small cut on his finger, which he believes is a skin tear resulting from his fall.      He has a history of congestive heart failure, diabetes, and neuropathy. He has undergone three major back surgeries in the past two years.    Allergies:  Aspirin      Current Outpatient Medications:     albuterol sulfate  (90 Base) MCG/ACT inhaler, 1 puff Every 6 (Six)  Hours As Needed., Disp: , Rfl:     allopurinol (ZYLOPRIM) 300 MG tablet, Take 1 tablet by mouth Daily., Disp: 90 tablet, Rfl: 0    atorvastatin (LIPITOR) 20 MG tablet, Take 1 tablet by mouth Daily., Disp: 90 tablet, Rfl: 0    bumetanide (BUMEX) 1 MG tablet, Take 1 tablet by mouth Daily., Disp: , Rfl:     Diclofenac Sodium (VOLTAREN) 1 % gel gel, Apply 4 g topically to the appropriate area as directed 4 (Four) Times a Day As Needed., Disp: , Rfl:     DULoxetine (CYMBALTA) 60 MG capsule, Take 1 capsule by mouth Daily., Disp: , Rfl:     Jardiance 10 MG tablet tablet, Take 1 tablet by mouth Daily., Disp: , Rfl:     lidocaine (LIDODERM) 5 %, Place 1 patch on the skin as directed by provider Daily., Disp: , Rfl:     lisinopril (PRINIVIL,ZESTRIL) 10 MG tablet, , Disp: , Rfl:     metFORMIN (GLUCOPHAGE) 500 MG tablet, Take 2 tablets by mouth 2 (Two) Times a Day With Meals. INSTRUCTED PER ANESTHESIA STANDING ORDERS, Disp: , Rfl:     metOLazone (ZAROXOLYN) 5 MG tablet, Take 1 tablet by mouth Daily., Disp: , Rfl:     metoprolol succinate XL (TOPROL-XL) 200 MG 24 hr tablet, Take 1 tablet by mouth Daily., Disp: 90 tablet, Rfl: 0    potassium chloride (KLOR-CON M10) 10 MEQ CR tablet, Take 1 tablet by mouth Daily., Disp: , Rfl:     pregabalin (LYRICA) 150 MG capsule, Take 1 capsule by mouth 2 (Two) Times a Day., Disp: 180 capsule, Rfl: 0    Semaglutide, 2 MG/DOSE, (Ozempic, 2 MG/DOSE,) 8 MG/3ML solution pen-injector, Inject 2 mg under the skin into the appropriate area as directed., Disp: , Rfl:     silver sulfadiazine (SILVADENE, SSD) 1 % cream, Apply pea sized amount to affected area twice daiy as needed., Disp: , Rfl:     tamsulosin (FLOMAX) 0.4 MG capsule 24 hr capsule, Take 1 capsule by mouth Daily., Disp: , Rfl:     traMADol (ULTRAM) 50 MG tablet, Take 1 tablet by mouth., Disp: , Rfl:     Vibegron (Gemtesa) 75 MG tablet, Take 1 tablet by mouth Daily., Disp: 28 tablet, Rfl: 0    Vibegron 75 MG tablet, Take 1 tablet by mouth  "Daily., Disp: 42 tablet, Rfl: 0    Past Medical History:   Diagnosis Date    Arthritis     Back pain     Bilateral ankle pain     NEUROPATHY    Corns and callus     Coronary artery disease     \"STIFF HEART\" ON BB, NO CARDIOLOGIST, PCP (-)CP OR SOA    Diabetes mellitus type 2, controlled     BG RUNS IN 'S IN AM    Hammertoe     Heel pain     Hyperlipemia     Hypertension     Left foot pain     Lymphedema     NO PROBLEMS LATELY    Numbness in feet     Pulmonary embolism 2013    SADDLE PE, RESOLVED     Past Surgical History:   Procedure Laterality Date    BACK SURGERY  2021    S1-L1 FUSION, CYST REMOVED (TOTAL OF 3 SURGERY  TO )    CARPAL TUNNEL RELEASE Right     COLONOSCOPY      CYST REMOVAL      BACK    FOOT SURGERY Right     ACHELLIS TENDON/HAMMER TOE    HYDROCELECTOMY Left 2023    Procedure: HYDROCELECTOMY, left;  Surgeon: Nicky Robertson MD;  Location: [facility] MAIN OR;  Service: Urology;  Laterality: Left;    KNEE ARTHROSCOPY Right     DIAGNOSTIC    TEMPORAL ARTERY BIOPSY Right 2022    Procedure: Right temporal artery biopsy;  Surgeon: Wilbert Jacob MD;  Location: [facility] MAIN OR;  Service: Vascular;  Laterality: Right;     Social History     Socioeconomic History    Marital status:    Tobacco Use    Smoking status: Former     Current packs/day: 0.00     Average packs/day: 0.5 packs/day for 4.1 years (2.0 ttl pk-yrs)     Types: Cigarettes     Start date:      Quit date: 1998     Years since quittin.3    Smokeless tobacco: Never   Vaping Use    Vaping status: Never Used   Substance and Sexual Activity    Alcohol use: Not Currently     Comment: VERY RARE    Drug use: Never    Sexual activity: Defer           Objective     Vitals:    25 1143   BP: 100/57   BP Location: Left arm   Patient Position: Sitting   Cuff Size: Adult   Pulse: 64   Resp: 18  Comment: 95% RA   Temp: 97.5 °F (36.4 °C)   TempSrc: Temporal   PainSc: 3    PainLoc: Head     There is no " height or weight on file to calculate BMI.    STEADI Fall Risk Assessment has not been completed.     Review of Systems     ROS:  Per HPI.     I have reviewed the HPI and ROS as documented by MA/RN. Kendra Berrios MD    Physical Exam     NAD  AAOx3, pleasant, cooperative    Physical Exam  The patient exhibits edema in both lower extremities, which appears to be improving based on the skin's appearance. Multiple shallow wounds are present on both lower extremities, more so on the right than the left, some with slough, some with dry eschar. A very large skin tear is observed on the right dorsal forearm. This was cleaned with normal saline and the thin flap was realigned and secured with Steri-Strips. A skin tear is noted at the ulnar base of the right fifth finger. A laceration is present on the right lateral eyebrow, accompanied by a contusion and abrasion on the right side of the forehead with swelling. A shallow wound is found on the dorsal left third toe with trace slough in the base. An intact blood blister is seen on the distal left great toe. No evidence of infection is found in any of the wounds.         RUE:        RLE:                        LLE:                      Result Review :  The following data was reviewed by: Kendra Berrios MD on 06/18/2025:    PCP note, Ortho note, remote CBC and BMP, no recent labs in our system               Assessment and Plan   Diagnoses and all orders for this visit:    1. Fall from standing, initial encounter (Primary)    2. Contusion of forehead, initial encounter    3. Abrasion of forehead, initial encounter    4. Laceration of right eyebrow, initial encounter    5. ISTAP type 1 skin tear of right forearm    6. Skin tear of right hand without complication, initial encounter    7. Edema of both lower extremities    8. Traumatic open wound of right lower leg, initial encounter    9. Traumatic open wound of left lower leg, initial encounter    10. Open wound of right knee,  initial encounter    11. Excoriation of multiple sites of left lower extremity, initial encounter        Assessment & Plan  1. Bilateral lower leg wounds.  The patient has multiple shallow wounds on both lower extremities, with the right being more affected than the left. The wounds are likely due to trauma from bumping into objects. There is no evidence of infection. The patient has been advised to continue wrapping the legs with the lymphedema clinic to manage swelling, which is a significant factor in wound healing. Aquacel Ag dressing will be applied to the wounds on the legs and the top of the right foot. Betadine will be used on the blood blister on the left great toe and the top of the left third toe. Vaseline will be applied to the healed areas for dry skin management. The patient is advised to use a walker consistently to prevent further falls.    2. Skin tear on the right dorsal forearm.  A large skin tear on the right dorsal forearm was cleaned with normal saline, and the thin flap was realigned and secured with Steri-Strips.    3. Skin tear on the ulnar base of the right fifth finger.  A skin tear on the ulnar base of the right fifth finger was noted. The patient will cover the area with a Band-Aid and apply Bacitracin.    4. Laceration on the right lateral eyebrow.  A laceration on the right lateral eyebrow with contusion and abrasion of the right side of the forehead with swelling was noted.  The patient will be transported over to the ED for further evaluation from his fall and management of the laceration and head injury.  He sustained no LOC and is not on blood thinners.  DESTINEE Samaniego PA-C notified of the patient's pending arrival to the ED and our findings today.    5. Congestive heart failure.  The patient has a history of congestive heart failure, which may contribute to his lower extremity edema. He is advised to continue managing his condition as per his current treatment plan.    6. Diabetes  mellitus.  The patient has diabetes, which may contribute to the dry skin on his feet and legs. He is advised to continue managing his diabetes as per his current treatment plan and ensure tight diabetic control to aid healing and prevent infections.    7. Neuropathy.  The patient has neuropathy, which may contribute to the dry skin on his feet and legs. He is advised to continue managing his condition as per his current treatment plan.    Follow-up  The patient will follow up in 2 weeks.    PROCEDURE  The skin tear on the right dorsal forearm was cleaned with normal saline and the thin flap was realigned and secured with Steri-Strips.      Patient was given instructions and counseling regarding their condition or for health maintenance advice, as well as the wound care plan and recommendations. They understand and agree with the plan.  They will follow back up here in the clinic but are instructed to contact us in the interim should they have any new, returning, or worsening symptoms or concerns. Please see specific information pulled into the AVS if appropriate.     Dragon Dictation utilized for chart completion.    I spent 95 minutes in both face-to-face and nonface-to-face time for patient care today including, but not limited to, review of old records, reviewing old images, history and physical exam, reviewing test results, counseling patient and family, contacting ED provider, coordinating care, and documenting.  This is separate from the time spent taking care of the right forearm laceration noted above as above.      Follow Up   Return in about 2 weeks (around 7/2/2025).      Kendra Berrios MD    Patient or patient representative verbalized consent for the use of Ambient Listening during the visit with  Kendra Berrios MD for chart documentation. 6/18/2025  13:22 EDT

## 2025-06-18 NOTE — ED PROVIDER NOTES
"Time: 3:19 PM EDT  Date of encounter:  6/18/2025  Independent Historian/Clinical History and Information was obtained by:   Patient and wife  Chief Complaint: Fall with head injury    History is limited by: N/A    History of Present Illness:  Patient is a 74 y.o. year old male who presents to the emergency department for evaluation of fall with head injury.  Patient was being seen at the [facility] wound center today.  Patient had walked into the lobby while his wife was parked in the car.  Patient states that he turned and his lost his footing and fell.  Patient is he landed on his right side.  Patient denies being on blood thinner.  Patient did sustain a laceration to his right eyebrow region.  Patient denies any loss of consciousness.  Patient denies any other complaints other than he is got a couple skin tears involving his right upper extremity.    Patient Care Team  Primary Care Provider: Tristan Phipps MD    Past Medical History:     Allergies   Allergen Reactions    Aspirin Anaphylaxis     Past Medical History:   Diagnosis Date    Arthritis     Back pain     Bilateral ankle pain     NEUROPATHY    Corns and callus     Coronary artery disease     \"STIFF HEART\" ON BB, NO CARDIOLOGIST, PCP (-)CP OR SOA    Diabetes mellitus type 2, controlled     BG RUNS IN 'S IN AM    Hammertoe     Heel pain     Hyperlipemia     Hypertension     Left foot pain     Lymphedema     NO PROBLEMS LATELY    Numbness in feet     Pulmonary embolism 2013    SADDLE PE, RESOLVED     Past Surgical History:   Procedure Laterality Date    BACK SURGERY  11/2021    S1-L1 FUSION, CYST REMOVED (TOTAL OF 3 SURGERY 2016 TO 2021)    CARPAL TUNNEL RELEASE Right     COLONOSCOPY  2014    CYST REMOVAL      BACK    FOOT SURGERY Right     ACHELLIS TENDON/HAMMER TOE    HYDROCELECTOMY Left 5/12/2023    Procedure: HYDROCELECTOMY, left;  Surgeon: Nicky Robertson MD;  Location: [facility] MAIN OR;  Service: Urology;  Laterality: Left;    KNEE ARTHROSCOPY Right " 2005    DIAGNOSTIC    TEMPORAL ARTERY BIOPSY Right 01/19/2022    Procedure: Right temporal artery biopsy;  Surgeon: Wilbert Jcaob MD;  Location: [facility] MAIN OR;  Service: Vascular;  Laterality: Right;     Family History   Problem Relation Age of Onset    Stroke Father     Heart disease Brother     Malamie Hyperthermia Neg Hx        Home Medications:  Prior to Admission medications    Medication Sig Start Date End Date Taking? Authorizing Provider   albuterol sulfate  (90 Base) MCG/ACT inhaler 1 puff Every 6 (Six) Hours As Needed. 3/3/23   Laura Aldrich MD   allopurinol (ZYLOPRIM) 300 MG tablet Take 1 tablet by mouth Daily. 9/15/21   Bronwyn Smith APRN   atorvastatin (LIPITOR) 20 MG tablet Take 1 tablet by mouth Daily. 9/15/21   Bronwyn Smith APRN   bumetanide (BUMEX) 1 MG tablet Take 1 tablet by mouth Daily. 2/12/25 2/12/26  Laura Aldrich MD   Diclofenac Sodium (VOLTAREN) 1 % gel gel Apply 4 g topically to the appropriate area as directed 4 (Four) Times a Day As Needed.    ProviderLaura MD   DULoxetine (CYMBALTA) 60 MG capsule Take 1 capsule by mouth Daily.    Laura Aldrich MD   Jardiance 10 MG tablet tablet Take 1 tablet by mouth Daily. 10/21/23   Laura Aldrich MD   lidocaine (LIDODERM) 5 % Place 1 patch on the skin as directed by provider Daily. 9/9/24   Laura Aldrich MD   lisinopril (PRINIVIL,ZESTRIL) 10 MG tablet  11/7/24   Laura Aldrich MD   metFORMIN (GLUCOPHAGE) 500 MG tablet Take 2 tablets by mouth 2 (Two) Times a Day With Meals. INSTRUCTED PER ANESTHESIA STANDING ORDERS 6/18/22   Laura Aldrich MD   metOLazone (ZAROXOLYN) 5 MG tablet Take 1 tablet by mouth Daily. 3/17/25 3/17/26  Laura Aldrich MD   metoprolol succinate XL (TOPROL-XL) 200 MG 24 hr tablet Take 1 tablet by mouth Daily. 9/15/21   Bronwyn Smith APRN   potassium chloride (KLOR-CON M10) 10 MEQ CR tablet Take 1 tablet by mouth Daily. 2/12/25 2/12/26  Valdemar  MD Laura   pregabalin (LYRICA) 150 MG capsule Take 1 capsule by mouth 2 (Two) Times a Day. 22   Bronwyn Smith APRN   Semaglutide, 2 MG/DOSE, (Ozempic, 2 MG/DOSE,) 8 MG/3ML solution pen-injector Inject 2 mg under the skin into the appropriate area as directed. 24   Laura Aldrich MD   silver sulfadiazine (SILVADENE, SSD) 1 % cream Apply pea sized amount to affected area twice daiy as needed. 24   Laura Aldrich MD   tamsulosin (FLOMAX) 0.4 MG capsule 24 hr capsule Take 1 capsule by mouth Daily. 3/12/25   Laura Aldrich MD   traMADol (ULTRAM) 50 MG tablet Take 1 tablet by mouth. 24   Laura Aldrich MD   Vibegron (Gemtesa) 75 MG tablet Take 1 tablet by mouth Daily. 25   Anya Cordon APRN   Vibegron 75 MG tablet Take 1 tablet by mouth Daily. 3/26/25   Anya Cordon APRN        Social History:   Social History     Tobacco Use    Smoking status: Former     Current packs/day: 0.00     Average packs/day: 0.5 packs/day for 4.1 years (2.0 ttl pk-yrs)     Types: Cigarettes     Start date:      Quit date: 1998     Years since quittin.3    Smokeless tobacco: Never   Vaping Use    Vaping status: Never Used   Substance Use Topics    Alcohol use: Not Currently     Comment: VERY RARE    Drug use: Never         Review of Systems:  Review of Systems   Constitutional:  Negative for chills and fever.   HENT:  Negative for congestion, ear pain and sore throat.    Eyes:  Negative for pain.   Respiratory:  Negative for cough, chest tightness and shortness of breath.    Cardiovascular:  Negative for chest pain.   Gastrointestinal:  Negative for abdominal pain, diarrhea, nausea and vomiting.   Genitourinary:  Negative for flank pain and hematuria.   Musculoskeletal:  Negative for joint swelling.   Skin:  Positive for wound. Negative for pallor.   Neurological:  Negative for seizures and headaches.   All other systems reviewed and are negative.    "    Physical Exam:  /80   Pulse 81   Temp 97.8 °F (36.6 °C) (Oral)   Resp 17   Ht 181.6 cm (71.5\")   Wt 103 kg (227 lb 1.2 oz)   SpO2 94%   BMI 31.23 kg/m²     Physical Exam    Vital signs were reviewed under triage note.  General appearance - Patient appears well-developed and well-nourished.  Patient is in no acute distress.  Head - Normocephalic.  Patient has some bruising developing over his right periorbital region as well as a laceration to his right eyebrow.  Pupils - Equal, round, reactive to light.  Extraocular muscles are intact.  Conjunctiva is clear.  Nasal - Normal inspection.  No evidence of trauma or epistaxis.  Tympanic membranes - Gray, intact without erythema or retractions.  Oral mucosa - Pink and moist without lesions or erythema.  Uvula is midline.  Chest wall - Atraumatic.  Chest wall is nontender.  There are no vesicular rashes noted.  Neck - Supple.  Trachea was midline.  There is no palpable lymphadenopathy or thyromegaly.  There are no meningeal signs  Lungs - Clear to auscultation and percussion bilaterally.  Heart - Regular rate and rhythm without any murmurs, clicks, or gallops.  Abdomen - Soft.  Bowel sounds are present.  There is no palpable tenderness.  There is no rebound, guarding, or rigidity.  There are no palpable masses.  There are no pulsatile masses.  Back - Spine is straight and midline.  There is no CVA tenderness.  Extremities - Intact x4 with full range of motion.  There is no palpable edema.  Pulses are intact x4 and equal.  Neurologic - Patient is awake, alert, and oriented x3.  Cranial nerves II through XII are grossly intact.  Motor and sensory functions grossly intact.  Cerebellar function was normal.  Integument - There are no rashes.  There are no petechia or purpura lesions noted.  There are no vesicular lesions noted.  Patient has a skin tear involving his right forearm as well as the little finger of his right hand.          Procedures:  Laceration " Repair    Date/Time: 6/18/2025 3:20 PM    Performed by: Shen Silva PA  Authorized by: Misha Cline DO    Consent:     Consent obtained:  Verbal    Consent given by:  Patient    Risks, benefits, and alternatives were discussed: yes      Risks discussed:  Infection, pain and need for additional repair    Alternatives discussed:  No treatment  Universal protocol:     Procedure explained and questions answered to patient or proxy's satisfaction: yes      Patient identity confirmed:  Verbally with patient  Anesthesia:     Anesthesia method:  Local infiltration    Local anesthetic:  Lidocaine 1% WITH epi  Laceration details:     Location:  Face    Face location:  R eyebrow    Length (cm):  3  Pre-procedure details:     Preparation:  Patient was prepped and draped in usual sterile fashion  Exploration:     Hemostasis achieved with:  Direct pressure    Contaminated: no    Treatment:     Area cleansed with:  Povidone-iodine and saline    Amount of cleaning:  Standard    Irrigation solution:  Sterile saline  Skin repair:     Repair method:  Sutures    Suture size:  5-0    Suture material:  Nylon    Suture technique:  Simple interrupted    Number of sutures:  5  Approximation:     Approximation:  Close  Repair type:     Repair type:  Simple  Post-procedure details:     Procedure completion:  Tolerated well, no immediate complications        Medical Decision Making:      Comorbidities that affect care:    Diabetes, hyperlipidemia, pulmonary embolism, lymphedema, hypertension, coronary artery disease, arthritis    External Notes reviewed:    Previous Clinic Note: Office visit with Dr. Berrios from earlier today was reviewed by me.      The following orders were placed and all results were independently analyzed by me:  Orders Placed This Encounter   Procedures    Laceration Repair    CT Head Without Contrast    CT Facial Bones Without Contrast       Medications Given in the Emergency Department:  Medications   lidocaine 1% -  EPINEPHrine 1:994838 (XYLOCAINE W/EPI) 1 %-1:561992 injection 10 mL (10 mL Injection Given 6/18/25 1434)        ED Course:     The patient was seen and evaluated in the ED by me.  The above history and physical examination was performed as documented.  Diagnostic data was obtained.  Results were reviewed.  There was no evidence of acute fracture or injury.  Findings were discussed with the patient and his wife.  CT scan of the head and face were negative.  Laceration was repaired.  Patient is stable for discharge home with outpatient follow-up.    Labs:    Lab Results (last 24 hours)       ** No results found for the last 24 hours. **             Imaging:    CT Head Without Contrast  Result Date: 6/18/2025  CT HEAD WO CONTRAST, CT FACIAL BONES WO CONTRAST Date of Exam: 6/18/2025 2:34 PM EDT Indication: Head injury. Comparison: CT head from July 20, 2023 Technique: Axial CT images were obtained of the head and facial bones without contrast administration.  Reconstructed coronal and sagittal images were also obtained. Automated exposure control and iterative construction methods were used. Findings: Head: No acute intracranial hemorrhage or extra-axial collection is identified. The ventricles appear normal in caliber, with no evidence of mass effect or midline shift. The basal cisterns appear patent. The gray-white differentiation appears preserved. The calvarium appear intact. The mastoid air cells are well-aerated. Facial bones: There is right periorbital soft tissue swelling. The nasopharynx is patent. No large soft tissue hematoma is identified. No acute fracture is identified. The nasal bones appear intact. The zygomatic arches appear intact. The mandible and temporomandibular joints appear intact. The globes and orbits appear intact. The paranasal sinuses are clear.     Impression: 1.No acute intracranial process or calvarial fracture identified. 2.Right periorbital soft tissue swelling. 3.No acute facial  fracture identified. Electronically Signed: Yassine Cheng MD  6/18/2025 3:07 PM EDT  Workstation ID: [id number]    CT Facial Bones Without Contrast  Result Date: 6/18/2025  CT HEAD WO CONTRAST, CT FACIAL BONES WO CONTRAST Date of Exam: 6/18/2025 2:34 PM EDT Indication: Head injury. Comparison: CT head from July 20, 2023 Technique: Axial CT images were obtained of the head and facial bones without contrast administration.  Reconstructed coronal and sagittal images were also obtained. Automated exposure control and iterative construction methods were used. Findings: Head: No acute intracranial hemorrhage or extra-axial collection is identified. The ventricles appear normal in caliber, with no evidence of mass effect or midline shift. The basal cisterns appear patent. The gray-white differentiation appears preserved. The calvarium appear intact. The mastoid air cells are well-aerated. Facial bones: There is right periorbital soft tissue swelling. The nasopharynx is patent. No large soft tissue hematoma is identified. No acute fracture is identified. The nasal bones appear intact. The zygomatic arches appear intact. The mandible and temporomandibular joints appear intact. The globes and orbits appear intact. The paranasal sinuses are clear.     Impression: 1.No acute intracranial process or calvarial fracture identified. 2.Right periorbital soft tissue swelling. 3.No acute facial fracture identified. Electronically Signed: Yassine Cheng MD  6/18/2025 3:07 PM EDT  Workstation ID: [id number]        Differential Diagnosis and Discussion:    Laceration: Laceration was evaluated for arterial injury, ligamentous damage, and other neurovascular injury.  Trauma:  Differential diagnosis considered but not limited to were subarachnoid hemorrhage, intracranial bleeding, pneumothorax, cardiac contusion, lung contusion, intra-abdominal bleeding, and compartment syndrome of any extremity or other significant traumatic  pathology    CT scan was performed in the emergency department and the CT scan radiology impression was interpreted by me.    MDM     Amount and/or Complexity of Data Reviewed  Tests in the radiology section of CPT®: reviewed             Patient Care Considerations:    LABS: I considered ordering labs, however laboratory data would not alter the ED treatment course or plan.      Consultants/Shared Management Plan:    None    Social Determinants of Health:    Patient is independent, reliable, and has access to care.       Disposition and Care Coordination:    Discharged: I considered escalation of care by admitting this patient to the hospital, however there were no acute traumatic injuries to warrant inpatient admission.    I have explained the patient´s condition, diagnoses and treatment plan based on the information available to me at this time. I have answered questions and addressed any concerns. The patient has a good  understanding of the patient´s diagnosis, condition, and treatment plan as can be expected at this point. The vital signs have been stable. The patient´s condition is stable and appropriate for discharge from the emergency department.      The patient will pursue further outpatient evaluation with the primary care physician or other designated or consulting physician as outlined in the discharge instructions. They are agreeable to this plan of care and follow-up instructions have been explained in detail. The patient has received these instructions in written format and has expressed an understanding of the discharge instructions. The patient is aware that any significant change in condition or worsening of symptoms should prompt an immediate return to this or the closest emergency department or call to 911.    Final diagnoses:   Laceration of right eyebrow, initial encounter   Periorbital contusion of right eye, initial encounter   Fall, initial encounter        ED Disposition       ED  Disposition   Discharge    Condition   Stable    Comment   --               This medical record created using voice recognition software.             Misha Cline DO  06/18/25 6544

## 2025-06-19 ENCOUNTER — TELEPHONE (OUTPATIENT)
Dept: UROLOGY | Age: 75
End: 2025-06-19
Payer: MEDICARE

## 2025-06-19 NOTE — TELEPHONE ENCOUNTER
I CALLED THE PATIENT AND TOLD HIM THAT [facility] SAID THEY HAVE SAMPLES.  HE CAN COME IN MONDAY TO PICK SOME UP.

## 2025-06-19 NOTE — TELEPHONE ENCOUNTER
PATIENT CALLED AND SAID HE IS OUT OF [facility] SAMPLES.  HE SAID WHEN HE WAS SEEN FOR HIS NURSES VISIT, HE WAS TOLD TO CALL TO SEE IF THERE ARE ANY SAMPLES IN [place] THAT HE CAN GO GET.    #[phone]

## 2025-06-20 ENCOUNTER — HOSPITAL ENCOUNTER (OUTPATIENT)
Dept: OCCUPATIONAL THERAPY | Facility: HOSPITAL | Age: 75
Setting detail: THERAPIES SERIES
Discharge: HOME | End: 2025-06-20
Payer: MEDICARE

## 2025-06-20 DIAGNOSIS — I89.0 LYMPHEDEMA: Primary | ICD-10-CM

## 2025-06-20 DIAGNOSIS — R26.2 DIFFICULTY IN WALKING, NOT ELSEWHERE CLASSIFIED: ICD-10-CM

## 2025-06-20 DIAGNOSIS — M62.81 GENERALIZED MUSCLE WEAKNESS: ICD-10-CM

## 2025-06-20 PROCEDURE — 97140 MANUAL THERAPY 1/> REGIONS: CPT

## 2025-06-20 PROCEDURE — 97535 SELF CARE MNGMENT TRAINING: CPT

## 2025-06-20 NOTE — THERAPY TREATMENT NOTE
"Outpatient Occupational Therapy Lymphedema Treatment Note   Arnaldo     Patient Name: Jonas Mercado  : 1950  MRN: [id number]  Today's Date: 2025      Visit Date: 2025    Patient Active Problem List   Diagnosis    Lymphedema    Hyperlipemia    Hypertension    Temporal arteritis    Bilateral primary osteoarthritis of knee    BPH (benign prostatic hyperplasia)    Lumbar adjacent segment disease with spondylolisthesis    Spinal stenosis    Vitamin D deficiency    Major depressive disorder, recurrent, moderate    Type 2 diabetes mellitus without complication    Chronic diastolic (congestive) heart failure    Constipation, unspecified    Difficulty in walking, not elsewhere classified    Generalized muscle weakness    KARLA (obstructive sleep apnea)        Past Medical History:   Diagnosis Date    Arthritis     Back pain     Bilateral ankle pain     NEUROPATHY    Corns and callus     Coronary artery disease     \"STIFF HEART\" ON BB, NO CARDIOLOGIST, PCP (-)CP OR SOA    Diabetes mellitus type 2, controlled     BG RUNS IN 'S IN AM    Hammertoe     Heel pain     Hyperlipemia     Hypertension     Left foot pain     Lymphedema     NO PROBLEMS LATELY    Numbness in feet     Pulmonary embolism 2013    SADDLE PE, RESOLVED        Past Surgical History:   Procedure Laterality Date    BACK SURGERY  2021    S1-L1 FUSION, CYST REMOVED (TOTAL OF 3 SURGERY  TO )    CARPAL TUNNEL RELEASE Right     COLONOSCOPY      CYST REMOVAL      BACK    FOOT SURGERY Right     ACHELLIS TENDON/HAMMER TOE    HYDROCELECTOMY Left 2023    Procedure: HYDROCELECTOMY, left;  Surgeon: Nicky Robertson MD;  Location: [facility] MAIN OR;  Service: Urology;  Laterality: Left;    KNEE ARTHROSCOPY Right     DIAGNOSTIC    TEMPORAL ARTERY BIOPSY Right 2022    Procedure: Right temporal artery biopsy;  Surgeon: Wilbert Jacob MD;  Location: [facility] MAIN OR;  Service: Vascular;  Laterality: Right;         Visit " Dx:      ICD-10-CM ICD-9-CM   1. Lymphedema  I89.0 457.1   2. Difficulty in walking, not elsewhere classified  R26.2 719.7   3. Generalized muscle weakness  M62.81 728.87        Lymphedema       Row Name 06/20/25 1000             Skin Changes/Observations    Location/Assessment Lower Extremity  -JS      Lower Extremity Conditions right:;left:;bilateral:;shiny;weeping;fragile  -JS      Lower Extremity Color/Pigment hyperpigmented;erythema;P'eau d'orange;bilateral:  -JS      Lower Extremity Skin Details Wounds noted on B anterior R worse than L. Stage II wound noted on dorsum of R foot. All wounds noted with beefy red granulating tissue and active bleeding after dressing removal. Wound on distal anterior R LE noted with significant slough.  -JS         Lymphedema Measurements    Measurement Type(s) Circumferential  -JS      Circumferential Areas Lower extremities  -JS         Compression/Skin Care    Compression/Skin Care skin care;wrapping location  -JS      Skin Care lotion applied;washed/dried;moisturizing lotion applied  -JS      Wrapping Location lower extremity  -JS      Wrapping Location LE bilateral:;foot to knee  -JS      Bandage Layers cotton liner;soft foam- 1/4 inch  -JS      Bandaging Comments 1/4 inch foam applied to circumference of B LE/ankle to build shape and improve comfort  -JS      Compression/Skin Care Comments The patient’s lower extremity skin was thoroughly cleaned, rinsed, and dried. Aquacel was applied to open areas with appropriate wound care technique, and a low pH lotion was used on intact skin to maintain hydration and integrity. A stockinet was applied, followed by a cotton liner, and short stretch bandages were wrapped in a circumferential pattern with 50% overlap, extending from the metatarsal heads to two finger-widths below the knee. Moderate to strong compression was applied as tolerated. Patient tolerated the procedure well.  -JS                User Key  (r) = Recorded By, (t) =  Taken By, (c) = Cosigned By      Initials Name Provider Type    Alex Fernandez OT Occupational Therapist                    Circumferential Measurements       Baseline: R: 2680.84 ml L: 2562.07 ml  % volume change: R: -5%   L: -4 %         OT Assessment/Plan       Row Name 06/20/25 1000          OT Assessment    Assessment Comments The patient presents with ongoing wound healing, demonstrating healthy scabbing and granulation tissue without signs of infection. Skin integrity is maintained, and edema management is progressing. The recent fall is being medically addressed and does not currently interfere with lymphedema treatment. Continued skilled OT intervention is recommended to monitor healing, ensure proper compression use, and reduce risk of further complications.  -JS     Patient would benefit from skilled therapy intervention Yes  -JS        OT Plan    OT Plan Comments Continue POC  -JS               User Key  (r) = Recorded By, (t) = Taken By, (c) = Cosigned By      Initials Name Provider Type    Alex Fernandez OT Occupational Therapist                        Manual Rx (Last 36 Hours)       Manual Treatments       Row Name 06/20/25 1051             Total Minutes    66472 - OT Manual Therapy Minutes 30  -JS                User Key  (r) = Recorded By, (t) = Taken By, (c) = Cosigned By      Initials Name Provider Type    Alex Fernandez OT Occupational Therapist                      Therapy Education  Education Details: The patient was educated on the continued importance of consistent compression garment and bandage compliance in managing lymphedema and promoting wound healing. Education was provided regarding signs and symptoms of compromised blood flow, including increased pain, numbness, discoloration, and temperature changes. The need for continued occupational therapy services to monitor skin integrity, wound healing, and edema control was reinforced.  Given: Symptoms/condition management  Program:  New  How Provided: Verbal  Provided to: Patient  Level of Understanding: Verbalized  16195 - OT Self Care/Mgmt Minutes: 30                Time Calculation:   Timed Charges  24408 - OT Manual Therapy Minutes: 30  52047 - OT Self Care/Mgmt Minutes: 30  Total Minutes  Timed Charges Total Minutes: 60   Total Minutes: 60     Therapy Charges for Today       Code Description Service Date Service Provider Modifiers Qty    75455307173 HC OT MANUAL THERAPY EA 15 MIN 6/20/2025 Alex Cardenas OT GO 2    21884704708 HC OT SELF CARE/MGMT/TRAIN EA 15 MIN 6/20/2025 Alex Cardenas OT GO 2                        Alex Cardenas OT  6/20/2025

## 2025-06-23 DIAGNOSIS — N32.81 OVERACTIVE BLADDER: Primary | ICD-10-CM

## 2025-06-23 RX ORDER — VIBEGRON 75 MG/1
75 TABLET, FILM COATED ORAL DAILY
Qty: 42 TABLET | Refills: 0 | COMMUNITY
Start: 2025-06-23

## 2025-06-24 ENCOUNTER — HOSPITAL ENCOUNTER (OUTPATIENT)
Dept: OCCUPATIONAL THERAPY | Facility: HOSPITAL | Age: 75
Setting detail: THERAPIES SERIES
Discharge: HOME | End: 2025-06-24
Payer: MEDICARE

## 2025-06-24 DIAGNOSIS — R26.2 DIFFICULTY IN WALKING, NOT ELSEWHERE CLASSIFIED: ICD-10-CM

## 2025-06-24 DIAGNOSIS — I89.0 LYMPHEDEMA: Primary | ICD-10-CM

## 2025-06-24 PROCEDURE — 97535 SELF CARE MNGMENT TRAINING: CPT

## 2025-06-24 PROCEDURE — 97140 MANUAL THERAPY 1/> REGIONS: CPT

## 2025-06-24 NOTE — THERAPY TREATMENT NOTE
"Outpatient Occupational Therapy Lymphedema Treatment Note   Arnaldo     Patient Name: Jonas Mercado  : 1950  MRN: [id number]  Today's Date: 2025      Visit Date: 2025    Patient Active Problem List   Diagnosis    Lymphedema    Hyperlipemia    Hypertension    Temporal arteritis    Bilateral primary osteoarthritis of knee    BPH (benign prostatic hyperplasia)    Lumbar adjacent segment disease with spondylolisthesis    Spinal stenosis    Vitamin D deficiency    Major depressive disorder, recurrent, moderate    Type 2 diabetes mellitus without complication    Chronic diastolic (congestive) heart failure    Constipation, unspecified    Difficulty in walking, not elsewhere classified    Generalized muscle weakness    KARLA (obstructive sleep apnea)        Past Medical History:   Diagnosis Date    Arthritis     Back pain     Bilateral ankle pain     NEUROPATHY    Corns and callus     Coronary artery disease     \"STIFF HEART\" ON BB, NO CARDIOLOGIST, PCP (-)CP OR SOA    Diabetes mellitus type 2, controlled     BG RUNS IN 'S IN AM    Hammertoe     Heel pain     Hyperlipemia     Hypertension     Left foot pain     Lymphedema     NO PROBLEMS LATELY    Numbness in feet     Pulmonary embolism 2013    SADDLE PE, RESOLVED        Past Surgical History:   Procedure Laterality Date    BACK SURGERY  2021    S1-L1 FUSION, CYST REMOVED (TOTAL OF 3 SURGERY  TO )    CARPAL TUNNEL RELEASE Right     COLONOSCOPY      CYST REMOVAL      BACK    FOOT SURGERY Right     ACHELLIS TENDON/HAMMER TOE    HYDROCELECTOMY Left 2023    Procedure: HYDROCELECTOMY, left;  Surgeon: Nicky Robertson MD;  Location: [facility] MAIN OR;  Service: Urology;  Laterality: Left;    KNEE ARTHROSCOPY Right     DIAGNOSTIC    TEMPORAL ARTERY BIOPSY Right 2022    Procedure: Right temporal artery biopsy;  Surgeon: Wilbert Jacob MD;  Location: [facility] MAIN OR;  Service: Vascular;  Laterality: Right;         Visit " Dx:      ICD-10-CM ICD-9-CM   1. Lymphedema  I89.0 457.1   2. Difficulty in walking, not elsewhere classified  R26.2 719.7        Lymphedema       Row Name 06/24/25 1600             Skin Changes/Observations    Location/Assessment Lower Extremity  -JS      Lower Extremity Conditions right:;left:;bilateral:;shiny;weeping;fragile  -JS      Lower Extremity Color/Pigment hyperpigmented;erythema;P'eau d'orange;bilateral:  -JS         Lymphedema Measurements    Measurement Type(s) Circumferential  -JS      Circumferential Areas Lower extremities  -JS         Compression/Skin Care    Compression/Skin Care skin care;wrapping location  -JS      Skin Care lotion applied;washed/dried;moisturizing lotion applied  -JS      Wrapping Location lower extremity  -JS      Wrapping Location LE bilateral:;foot to knee  -JS      Bandage Layers cotton liner;soft foam- 1/4 inch  -JS      Bandaging Comments 1/4 inch foam applied to circumference of B LE/ankle to build shape and improve comfort  -JS      Compression/Skin Care Comments Upon removal of existing bandages, wounds were visually assessed and appeared to be healing well without signs of active infection. The therapist gently cleaned, rinsed, and thoroughly dried the patient’s lower extremity skin. A low pH moisturizer was applied to support skin integrity. Weeping areas were addressed with sterile ABD pads and secured with kerlex gauze. A stockinet was applied over the limb, followed by a cotton liner and short-stretch bandages. Compression bandaging was performed in a circumferential pattern with 50% overlap, beginning at the metatarsal heads and ending two finger-widths below the knee, with moderate to strong compression to promote lymphatic return.  -JS                User Key  (r) = Recorded By, (t) = Taken By, (c) = Cosigned By      Initials Name Provider Type    Alex Fernandez OT Occupational Therapist                             OT Assessment/Plan       Row Name 06/24/25  1600          OT Assessment    Assessment Comments The patient continues to demonstrate positive wound healing progression, with no signs of infection and improved tolerance to compression therapy. Skin care and wrapping were performed with attention to wound protection and pressure gradient. Continued OT is recommended to support wound healing, ensure proper compression application, monitor skin integrity, and reinforce patient education for long-term lymphedema management.  -JS     Patient would benefit from skilled therapy intervention Yes  -JS        OT Plan    OT Plan Comments Continue POC  -JS               User Key  (r) = Recorded By, (t) = Taken By, (c) = Cosigned By      Initials Name Provider Type    Alex Fernandez OT Occupational Therapist                        Manual Rx (Last 36 Hours)       Manual Treatments       Row Name 06/24/25 1600             Total Minutes    03095 - OT Manual Therapy Minutes 45  -JS                User Key  (r) = Recorded By, (t) = Taken By, (c) = Cosigned By      Initials Name Provider Type    Alex Fernandez OT Occupational Therapist                      Therapy Education  Education Details: The therapist provided verbal and visual education on the importance of consistent bandage and garment compliance in the management of lymphedema. Instruction was given on recognizing early signs of vascular compromise, including increased pain, numbness, tingling, skin discoloration, or coldness in the extremity. The therapist also reviewed the role of continued occupational therapy in promoting wound healing, managing limb volume, and preventing further complications.  Given: Symptoms/condition management  Program: New  How Provided: Verbal  Provided to: Patient  Level of Understanding: Verbalized  05535 - OT Self Care/Mgmt Minutes: 14                Time Calculation:   Timed Charges  10996 - OT Manual Therapy Minutes: 45  92385 - OT Self Care/Mgmt Minutes: 14  Total Minutes  Timed  Charges Total Minutes: 59   Total Minutes: 59     Therapy Charges for Today       Code Description Service Date Service Provider Modifiers Qty    37110769951 HC OT MANUAL THERAPY EA 15 MIN 6/24/2025 Alex Cardenas OT GO 3    39429539129 HC OT SELF CARE/MGMT/TRAIN EA 15 MIN 6/24/2025 Alex Cardenas OT GO 1                        Alex Cardenas OT  6/24/2025

## 2025-06-27 ENCOUNTER — HOSPITAL ENCOUNTER (OUTPATIENT)
Dept: OCCUPATIONAL THERAPY | Facility: HOSPITAL | Age: 75
Setting detail: THERAPIES SERIES
Discharge: HOME | End: 2025-06-27
Payer: MEDICARE

## 2025-06-27 DIAGNOSIS — M62.81 GENERALIZED MUSCLE WEAKNESS: ICD-10-CM

## 2025-06-27 DIAGNOSIS — R26.2 DIFFICULTY IN WALKING, NOT ELSEWHERE CLASSIFIED: ICD-10-CM

## 2025-06-27 DIAGNOSIS — I89.0 LYMPHEDEMA: Primary | ICD-10-CM

## 2025-06-27 PROCEDURE — 97140 MANUAL THERAPY 1/> REGIONS: CPT

## 2025-06-27 PROCEDURE — 97535 SELF CARE MNGMENT TRAINING: CPT

## 2025-06-27 NOTE — THERAPY TREATMENT NOTE
"Outpatient Occupational Therapy Lymphedema Treatment Note   Arnaldo     Patient Name: Jonas Mercado  : 1950  MRN: [id number]  Today's Date: 2025      Visit Date: 2025    Patient Active Problem List   Diagnosis    Lymphedema    Hyperlipemia    Hypertension    Temporal arteritis    Bilateral primary osteoarthritis of knee    BPH (benign prostatic hyperplasia)    Lumbar adjacent segment disease with spondylolisthesis    Spinal stenosis    Vitamin D deficiency    Major depressive disorder, recurrent, moderate    Type 2 diabetes mellitus without complication    Chronic diastolic (congestive) heart failure    Constipation, unspecified    Difficulty in walking, not elsewhere classified    Generalized muscle weakness    KARLA (obstructive sleep apnea)        Past Medical History:   Diagnosis Date    Arthritis     Back pain     Bilateral ankle pain     NEUROPATHY    Corns and callus     Coronary artery disease     \"STIFF HEART\" ON BB, NO CARDIOLOGIST, PCP (-)CP OR SOA    Diabetes mellitus type 2, controlled     BG RUNS IN 'S IN AM    Hammertoe     Heel pain     Hyperlipemia     Hypertension     Left foot pain     Lymphedema     NO PROBLEMS LATELY    Numbness in feet     Pulmonary embolism 2013    SADDLE PE, RESOLVED        Past Surgical History:   Procedure Laterality Date    BACK SURGERY  2021    S1-L1 FUSION, CYST REMOVED (TOTAL OF 3 SURGERY  TO )    CARPAL TUNNEL RELEASE Right     COLONOSCOPY      CYST REMOVAL      BACK    FOOT SURGERY Right     ACHELLIS TENDON/HAMMER TOE    HYDROCELECTOMY Left 2023    Procedure: HYDROCELECTOMY, left;  Surgeon: Nicky Robertson MD;  Location: [facility] MAIN OR;  Service: Urology;  Laterality: Left;    KNEE ARTHROSCOPY Right     DIAGNOSTIC    TEMPORAL ARTERY BIOPSY Right 2022    Procedure: Right temporal artery biopsy;  Surgeon: Wilbert Jacob MD;  Location: [facility] MAIN OR;  Service: Vascular;  Laterality: Right;         Visit " "Dx:      ICD-10-CM ICD-9-CM   1. Lymphedema  I89.0 457.1   2. Difficulty in walking, not elsewhere classified  R26.2 719.7   3. Generalized muscle weakness  M62.81 728.87        Lymphedema       Row Name 06/27/25 1000             Subjective Pain    Able to rate subjective pain? yes  -MP      Pre-Treatment Pain Level 0  -MP      Post-Treatment Pain Level 0  -MP      Subjective Pain Comment pt denies pain  -MP         Subjective    Subjective Comments Patient and wife states wounds are healing well since starting OT  -MP         Lymphedema Assessment    Lymphedema Classification LLE:;RLE:;secondary;stage 2 (Spontaneously Irreversible)  -MP         Lymphedema Edema Assessment    Ptting Edema Category By grade out of 4  -MP      Pitting Edema + 1/4 (+1 of 4)  right  -MP      Stemmer Sign negative;bilateral:  -MP      [place] Hump bilateral:;negative  -MP         Skin Changes/Observations    Location/Assessment Lower Extremity  -MP      Lower Extremity Conditions right:;left:;bilateral:;shiny;weeping;fragile  -MP      Lower Extremity Color/Pigment hyperpigmented;erythema;P'eau d'orange;bilateral:  -MP         Lymphedema Sensation    Lymphedema Sensation Comments no concerns  -MP         Lymphedema Pulses/Capillary Refill    Capillary Refill lower extremity capillary refill  -MP      Lower Extremity Capillary Refill right:;left:;less than 3 seconds  -MP         Lymphedema Measurements    Measurement Type(s) Circumferential  -MP      Circumferential Areas Lower extremities  -MP         Compression/Skin Care    Compression/Skin Care skin care;wrapping location  -MP      Skin Care lotion applied;washed/dried;moisturizing lotion applied  -MP      Wrapping Location lower extremity  -MP      Wrapping Location LE bilateral:;foot to knee  -MP      Bandage Layers cotton liner;soft foam- 1/4 inch  -MP      Bandaging Comments 1/4\" foam applied at bilateral anterior ankles to build shape and improve comfort.  -MP      " Compression/Skin Care Comments OT cleaned wounds with sterile saline and gauze, covered with oil emulsion dressing prior to stockinette and compression wrapping  -MP                User Key  (r) = Recorded By, (t) = Taken By, (c) = Cosigned By      Initials Name Provider Type    Marilu Nielsen OT Occupational Therapist                    Circumferential Measurements:        Baseline: R: 2680.84 ml L: 2562.07 ml  % volume change: R: -5%   L: -4 %     OT Assessment/Plan       Row Name 06/27/25 1046          OT Assessment    Assessment Comments Jonas presents with improved skin integrity and good volume reduction bilateral lower extremities.  He requires ongoing skilled occupational therapy services to promote wound healing and continue to reduce limb volume until a plateau is achieved, permanent compression garments are obtained and education is completed to ensure good follow-through with complete decongestive therapy.  -MP               User Key  (r) = Recorded By, (t) = Taken By, (c) = Cosigned By      Initials Name Provider Type    Marilu Nielsen OT Occupational Therapist                      Manual Rx (Last 36 Hours)       Manual Treatments       Row Name 06/27/25 1344             Total Minutes    02407 - OT Manual Therapy Minutes 42  -MP                User Key  (r) = Recorded By, (t) = Taken By, (c) = Cosigned By      Initials Name Provider Type    Marilu Nielsen OT Occupational Therapist                  Therapy Education  Education Details: Jonas was asked to continue to maintain compression between treatment sessions, continue to monitor the great toe and left foot for any concerns and continue to perform exercises and elevation of legs between activities.  Given: Symptoms/condition management, HEP  Program: Reinforced  How Provided: Verbal  Provided to: Patient  Level of Understanding: Verbalized  93766 - OT Self Care/Mgmt Minutes: 17       Time Calculation:   Timed Charges  81357 - OT  Manual Therapy Minutes: 42  92504 - OT Self Care/Mgmt Minutes: 17  Total Minutes  Timed Charges Total Minutes: 59   Total Minutes: 59     Therapy Charges for Today       Code Description Service Date Service Provider Modifiers Qty    81997651153 HC OT MANUAL THERAPY EA 15 MIN 6/27/2025 Marilu Rosenberg OT GO 3    [id number]  OT SELF CARE/MGMT/TRAIN EA 15 MIN 6/27/2025 Marilu Rosenberg OT GO 1            Marilu Rosenberg OT  6/27/2025

## 2025-06-30 ENCOUNTER — OFFICE VISIT (OUTPATIENT)
Dept: WOUND CARE | Facility: HOSPITAL | Age: 75
End: 2025-06-30
Payer: MEDICARE

## 2025-06-30 ENCOUNTER — HOSPITAL ENCOUNTER (OUTPATIENT)
Facility: HOSPITAL | Age: 75
Discharge: HOME | End: 2025-06-30
Admitting: EMERGENCY MEDICINE
Payer: MEDICARE

## 2025-06-30 ENCOUNTER — TELEPHONE (OUTPATIENT)
Dept: UROLOGY | Age: 75
End: 2025-06-30
Payer: MEDICARE

## 2025-06-30 VITALS
HEART RATE: 81 BPM | TEMPERATURE: 97.5 F | SYSTOLIC BLOOD PRESSURE: 135 MMHG | RESPIRATION RATE: 18 BRPM | DIASTOLIC BLOOD PRESSURE: 78 MMHG

## 2025-06-30 DIAGNOSIS — S81.001D OPEN WOUND OF RIGHT KNEE, SUBSEQUENT ENCOUNTER: ICD-10-CM

## 2025-06-30 DIAGNOSIS — M25.461 SWELLING OF RIGHT KNEE: ICD-10-CM

## 2025-06-30 DIAGNOSIS — S81.801D TRAUMATIC OPEN WOUND OF RIGHT LOWER LEG, SUBSEQUENT ENCOUNTER: Primary | ICD-10-CM

## 2025-06-30 DIAGNOSIS — S61.411D SKIN TEAR OF RIGHT HAND WITHOUT COMPLICATION, SUBSEQUENT ENCOUNTER: ICD-10-CM

## 2025-06-30 DIAGNOSIS — M25.561 ACUTE PAIN OF RIGHT KNEE: ICD-10-CM

## 2025-06-30 DIAGNOSIS — S51.811A: ICD-10-CM

## 2025-06-30 DIAGNOSIS — S80.812D: ICD-10-CM
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
## 2025-06-30 DIAGNOSIS — S81.802D TRAUMATIC OPEN WOUND OF LEFT LOWER LEG, SUBSEQUENT ENCOUNTER: ICD-10-CM

## 2025-06-30 PROCEDURE — 1159F MED LIST DOCD IN RCRD: CPT | Performed by: EMERGENCY MEDICINE

## 2025-06-30 PROCEDURE — 1160F RVW MEDS BY RX/DR IN RCRD: CPT | Performed by: EMERGENCY MEDICINE

## 2025-06-30 PROCEDURE — 3078F DIAST BP <80 MM HG: CPT | Performed by: EMERGENCY MEDICINE

## 2025-06-30 PROCEDURE — 3075F SYST BP GE 130 - 139MM HG: CPT | Performed by: EMERGENCY MEDICINE

## 2025-06-30 PROCEDURE — G0463 HOSPITAL OUTPT CLINIC VISIT: HCPCS | Performed by: EMERGENCY MEDICINE

## 2025-06-30 PROCEDURE — 73562 X-RAY EXAM OF KNEE 3: CPT

## 2025-06-30 NOTE — PROGRESS NOTES
Wound Location:Right knee     Wound Exudate:moderate  Wound Debridement:Mechanically debrided with normal saline & gauze  Wound Thickness: full    Cleanse with:  NS or antibacterial soap and water    Primary: bordered dressing     Raad-wound: moisturizer    Instructions: Cleanse wound with ns or antibacterial soap and water, pat dry, apply vaseline to wound base and cover with dry bordered dressing. Apply moisturizer to raad-wound daily.     Wound Location:Left knee     Wound Exudate:moderate  Wound Debridement:Mechanically debrided with normal saline & gauze  Wound Thickness: full    Cleanse with:  NS or antibacterial soap and water    Primary: bordered dressing     Raad-wound: moisturizer    Instructions: Cleanse wound with ns or antibacterial soap and water, pat dry, apply vaseline to wound base and cover with dry bordered dressing. Apply moisturizer to raad-wound daily.     Wound Location: Right Arm     Wound Exudate: small   Wound Debridement: Mechanically debrided with normal saline & gauze  Wound Thickness: full    Cleanse with:  NS or antibacterial soap and water    Primary: SANIYA    Instructions: Cleanse wound with ns or antibacterial soap and water, pat dry, apply Vaseline to wound base, Leave SANIYA and apply daily      Wound Location: Right Anterior Leg    Wound Exudate: moderate  Wound Debridement: Mechanically debrided with normal saline & gauze  Wound Thickness: full    Cleanse with:  NS or antibacterial soap and water    Primary: gauze  Secured with:rolled gauze & elastic wrap  Raad-wound: Moisturizer    Instructions: Cleanse wound with ns or antibacterial soap and water, pat dry, apply vaseline to wound base, wrap lower extremity with rolled gauze & elastic wrap from base of toes to just below the knee. Change daily.     Wound Location: Right Distal Leg    Wound Exudate: moderate  Wound Debridement: Mechanically debrided with normal saline & gauze  Wound Thickness: full    Cleanse with:  NS or  antibacterial soap and water    Primary: gauze  Secured with:rolled gauze & elastic wrap  Raad-wound: Moisturizer    Instructions: Cleanse wound with ns or antibacterial soap and water, pat dry, apply vaseline to wound base, wrap lower extremity with rolled gauze & elastic wrap from base of toes to just below the knee. Change daily.     Wound Location: Right Foot    Wound Exudate: moderate  Wound Debridement: Mechanically debrided with normal saline & gauze  Wound Thickness: full    Cleanse with:  NS or antibacterial soap and water    Moisturize and leave SANIYA    Instructions: Cleanse wound with ns or antibacterial soap and water, pat dry, apply moisturizer and wrap lower extremity with rolled gauze & elastic wrap from base of toes to just below the knee. Change daily.     Wound Location: Left leg    Wound Exudate: small  Wound Debridement: Mechanically debrided with normal saline & gauze  Wound Thickness: full     Cleanse with:  NS or antibacterial soap and water    Primary: rolled gauze  Secured with: rolled gauze and elastic wrap  Raad-wound: moisturizer    Instructions: Cleanse wound with ns or antibacterial soap and water, pat dry, apply moisturizer to wound base & raad-wound  and cover with rolled gauze, tape and elastic wrap. Change daily.    Wound Location: Left third toe     Wound Exudate: small  Wound Debridement: Mechanically debrided with normal saline & gauze  Wound Thickness: full     Cleanse with:  NS or antibacterial soap and water    Primary: gauze    Raad-wound: betadine    Instructions: Cleanse wound with ns or antibacterial soap and water, pat dry, paint wound base & raad-wound with betadine and cover with gauze and tape. Change daily.     Wound Location: Left Great toe     Wound Exudate: small  Wound Debridement: Mechanically debrided with normal saline & gauze  Wound Thickness: full     Cleanse with:  NS or antibacterial soap and water    Primary: gauze    Raad-wound: betadine    Instructions:  Cleanse wound with ns or antibacterial soap and water, pat dry, paint wound base & raad-wound with betadine and cover with gauze and tape. Change daily.    Wound Location: Right Eye    Wound Exudate: small  Wound Debridement: Mechanically debrided with normal saline & gauze  Wound Thickness: full    Cleanse with:  NS or antibacterial soap and water    Moisturize and leave SANIYA    Education: Educated patient/family member/CG on how to apply new dressings, verbal understanding provided. Patient/family member/CG instructed to call with any questions or concerns

## 2025-06-30 NOTE — PROGRESS NOTES
Chief Complaint  Wound Check (Pt here today for wound check to BLE, pt goes to lymphedema 2x weekly, Pt completed ABX, not monitoring CBG)    Subjective      History of Present Illness    Jonas Mercado  is a 74 y.o. male     History of Present Illness  The patient is a 74-year-old male here for a recheck of multiple wounds.    He has been attending the lymphedema clinic where his legs are being wrapped. He has appointments with the lymphedema clinic tomorrow, typically visiting them twice a week on Tuesdays and Fridays.    He reports tenderness in his small finger, which he believes may be due to a deep wound. He has been applying Neosporin and covering it with a Band-Aid.    He has not been applying any treatment to his toes, which are wrapped by the clinic.    He has been using Band-Aids and Neosporin on his knee wounds, and he also ices the right knee due to pain and swelling. This issue arose following a fall approximately 2 weeks ago. No x-rays have been taken. He experiences pain when walking on the affected knee, which is particularly stiff in the mornings but becomes more flexible by nightfall.    His stitches from an eyebrow laceration have been removed, and the area appears to be healing well. He has not been applying any topical treatments to it.    A large skin tear on his arm is also showing signs of improvement.    MEDICATIONS  Current: Neosporin    Allergies:  Aspirin      Current Outpatient Medications:     albuterol sulfate  (90 Base) MCG/ACT inhaler, 1 puff Every 6 (Six) Hours As Needed., Disp: , Rfl:     allopurinol (ZYLOPRIM) 300 MG tablet, Take 1 tablet by mouth Daily., Disp: 90 tablet, Rfl: 0    atorvastatin (LIPITOR) 20 MG tablet, Take 1 tablet by mouth Daily., Disp: 90 tablet, Rfl: 0    bumetanide (BUMEX) 1 MG tablet, Take 1 tablet by mouth Daily., Disp: , Rfl:     Diclofenac Sodium (VOLTAREN) 1 % gel gel, Apply 4 g topically to the appropriate area as directed 4 (Four) Times a  "Day As Needed., Disp: , Rfl:     DULoxetine (CYMBALTA) 60 MG capsule, Take 1 capsule by mouth Daily., Disp: , Rfl:     Jardiance 10 MG tablet tablet, Take 1 tablet by mouth Daily., Disp: , Rfl:     lidocaine (LIDODERM) 5 %, Place 1 patch on the skin as directed by provider Daily., Disp: , Rfl:     lisinopril (PRINIVIL,ZESTRIL) 10 MG tablet, , Disp: , Rfl:     metFORMIN (GLUCOPHAGE) 500 MG tablet, Take 2 tablets by mouth 2 (Two) Times a Day With Meals. INSTRUCTED PER ANESTHESIA STANDING ORDERS, Disp: , Rfl:     metOLazone (ZAROXOLYN) 5 MG tablet, Take 1 tablet by mouth Daily., Disp: , Rfl:     metoprolol succinate XL (TOPROL-XL) 200 MG 24 hr tablet, Take 1 tablet by mouth Daily., Disp: 90 tablet, Rfl: 0    potassium chloride (KLOR-CON M10) 10 MEQ CR tablet, Take 1 tablet by mouth Daily., Disp: , Rfl:     pregabalin (LYRICA) 150 MG capsule, Take 1 capsule by mouth 2 (Two) Times a Day., Disp: 180 capsule, Rfl: 0    Semaglutide, 2 MG/DOSE, (Ozempic, 2 MG/DOSE,) 8 MG/3ML solution pen-injector, Inject 2 mg under the skin into the appropriate area as directed., Disp: , Rfl:     silver sulfadiazine (SILVADENE, SSD) 1 % cream, Apply pea sized amount to affected area twice daiy as needed., Disp: , Rfl:     tamsulosin (FLOMAX) 0.4 MG capsule 24 hr capsule, Take 1 capsule by mouth Daily., Disp: , Rfl:     traMADol (ULTRAM) 50 MG tablet, Take 1 tablet by mouth., Disp: , Rfl:     Vibegron (Gemtesa) 75 MG tablet, Take 1 tablet by mouth Daily., Disp: 28 tablet, Rfl: 0    Vibegron (Gemtesa) 75 MG tablet, Take 1 tablet by mouth Daily., Disp: 42 tablet, Rfl: 0    Vibegron 75 MG tablet, Take 1 tablet by mouth Daily., Disp: 42 tablet, Rfl: 0    Past Medical History:   Diagnosis Date    Arthritis     Back pain     Bilateral ankle pain     NEUROPATHY    Corns and callus     Coronary artery disease     \"STIFF HEART\" ON BB, NO CARDIOLOGIST, PCP (-)CP OR SOA    Diabetes mellitus type 2, controlled     BG RUNS IN 'S IN AM    Katie  "    Heel pain     Hyperlipemia     Hypertension     Left foot pain     Lymphedema     NO PROBLEMS LATELY    Numbness in feet     Pulmonary embolism 2013    SADDLE PE, RESOLVED     Past Surgical History:   Procedure Laterality Date    BACK SURGERY  2021    S1-L1 FUSION, CYST REMOVED (TOTAL OF 3 SURGERY  TO )    CARPAL TUNNEL RELEASE Right     COLONOSCOPY  2014    CYST REMOVAL      BACK    FOOT SURGERY Right     ACHELLIS TENDON/HAMMER TOE    HYDROCELECTOMY Left 2023    Procedure: HYDROCELECTOMY, left;  Surgeon: Nicky Robertson MD;  Location: [facility] MAIN OR;  Service: Urology;  Laterality: Left;    KNEE ARTHROSCOPY Right     DIAGNOSTIC    TEMPORAL ARTERY BIOPSY Right 2022    Procedure: Right temporal artery biopsy;  Surgeon: Wilbert Jacob MD;  Location: [facility] MAIN OR;  Service: Vascular;  Laterality: Right;     Social History     Socioeconomic History    Marital status:    Tobacco Use    Smoking status: Former     Current packs/day: 0.00     Average packs/day: 0.5 packs/day for 4.1 years (2.0 ttl pk-yrs)     Types: Cigarettes     Start date:      Quit date: 1998     Years since quittin.4    Smokeless tobacco: Never   Vaping Use    Vaping status: Never Used   Substance and Sexual Activity    Alcohol use: Not Currently     Comment: VERY RARE    Drug use: Never    Sexual activity: Defer           Objective     Vitals:    25 1044   BP: 135/78   BP Location: Right arm   Patient Position: Sitting   Cuff Size: Adult   Pulse: 81   Resp: 18   Temp: 97.5 °F (36.4 °C)   TempSrc: Temporal   PainSc: 6   Comment: 94% RA   PainLoc: Knee     There is no height or weight on file to calculate BMI.    STEADI Fall Risk Assessment has not been completed.     Review of Systems     ROS:  Per HPI.     I have reviewed the HPI and ROS as documented by MA/RN. Kendra Berrios MD    Physical Exam     NAD  AAOx3, pleasant, cooperative    Physical Exam  Bilateral lower extremity wounds are  significantly improved and healing well. Right knee shows effusion and mild to moderate tenderness along the medial aspect. Faint resolving ecchymosis present. Ecchymosis also noted on left anterior knee but without significant swelling or tenderness.  Right eyebrow laceration remains well approximated with slightly rolled edges and overall appears to be healing appropriately. Right forearm large skin tear is healing very well and majority of the flap has survived and readhered. Left great toe with large soft black eschar from prior blood blister. Shallow ulcerations dorsal left 2nd and 3rd toes. Right ulnar aspect of the right 5th proximal finger with shallow round wound with dry raised base. No evidence of infection of any of the wounds.         RUE:        RLE:                        LLE:                      Result Review :  The following data was reviewed by: Kendra Berrios MD on 06/30/2025:    Prior notes and images.               Assessment and Plan   Diagnoses and all orders for this visit:    1. Traumatic open wound of right lower leg, subsequent encounter (Primary)    2. Traumatic open wound of left lower leg, subsequent encounter    3. Open wound of right knee, subsequent encounter    4. Excoriation of multiple sites of left lower extremity, subsequent encounter    5. ISTAP type 1 skin tear of right forearm    6. Skin tear of right hand without complication, subsequent encounter    7. Acute pain of right knee  -     XR knee 3 vw right; Future    8. Swelling of right knee  -     XR knee 3 vw right; Future    9. Fall from standing, subsequent encounter  -     XR knee 3 vw right; Future        Assessment & Plan  1. Multiple wounds.  His bilateral lower extremity wounds have shown significant improvement and are healing well. The right eyebrow laceration remains well approximated with slightly rolled edges and appears to be healing appropriately. The large skin tear on the right forearm is healing very well,  with the majority of the flap surviving and readhering. The left great toe has a large soft black eschar from a previous blood blister. There are shallow ulcerations on the dorsal aspect of the left second and third toes. The right ulnar aspect of the right fifth proximal finger has a shallow round wound with a dry raised base. There is no evidence of infection in any of the wounds. He was advised to apply Vaseline to all dry areas, including the eyebrow, arm, and pinky finger. Betadine should be applied to the toes on the left foot before wrapping. An x-ray of the right knee was ordered to rule out any fractures or other issues since his fall.    2. Right knee effusion.  The right knee shows effusion and mild to moderate tenderness along the medial aspect, with faint resolving ecchymosis present. An x-ray of the right knee was ordered to ensure there are no underlying fractures or other issues from his recent fall.    Follow-up  The patient will follow up in 2 to 3 weeks.      Patient was given instructions and counseling regarding their condition or for health maintenance advice, as well as the wound care plan and recommendations. They understand and agree with the plan.  They will follow back up here in the clinic but are instructed to contact us in the interim should they have any new, returning, or worsening symptoms or concerns. Please see specific information pulled into the AVS if appropriate.     Dragon Dictation utilized for chart completion.    I spent 42 minutes in both face-to-face and nonface-to-face time for patient care today including, but not limited to, review of old records, reviewing old images, history and physical exam, reviewing test results, counseling patient and family, entering orders, coordinating care, and documenting.      Follow Up   Return in about 3 weeks (around 7/21/2025).      Kendra Berrios MD    Patient or patient representative verbalized consent for the use of Ambient  Listening during the visit with  Kendra Berrios MD for chart documentation. 6/30/2025  12:16 EDT

## 2025-06-30 NOTE — TELEPHONE ENCOUNTER
Caller: Jonas Mercado    Relationship to patient: Self    Best call back number: [phone]    Chief complaint: PTNS    Type of visit: NURSE VISIT    Requested date[facility]2025 [facility]     If rescheduling, when is the original appointment: N/A     Additional notes:THANK YOU

## 2025-07-01 ENCOUNTER — HOSPITAL ENCOUNTER (OUTPATIENT)
Dept: OCCUPATIONAL THERAPY | Facility: HOSPITAL | Age: 75
Setting detail: THERAPIES SERIES
Discharge: HOME | End: 2025-07-01
Payer: MEDICARE

## 2025-07-01 DIAGNOSIS — I89.0 LYMPHEDEMA: Primary | ICD-10-CM

## 2025-07-01 DIAGNOSIS — M62.81 GENERALIZED MUSCLE WEAKNESS: ICD-10-CM

## 2025-07-01 DIAGNOSIS — R26.2 DIFFICULTY IN WALKING, NOT ELSEWHERE CLASSIFIED: ICD-10-CM

## 2025-07-01 PROCEDURE — 97535 SELF CARE MNGMENT TRAINING: CPT

## 2025-07-01 PROCEDURE — 97140 MANUAL THERAPY 1/> REGIONS: CPT

## 2025-07-01 NOTE — THERAPY TREATMENT NOTE
"Outpatient Occupational Therapy Lymphedema Treatment Note   Arnaldo     Patient Name: Jonas Mercado  : 1950  MRN: [id number]  Today's Date: 2025      Visit Date: 2025    Patient Active Problem List   Diagnosis    Lymphedema    Hyperlipemia    Hypertension    Temporal arteritis    Bilateral primary osteoarthritis of knee    BPH (benign prostatic hyperplasia)    Lumbar adjacent segment disease with spondylolisthesis    Spinal stenosis    Vitamin D deficiency    Major depressive disorder, recurrent, moderate    Type 2 diabetes mellitus without complication    Chronic diastolic (congestive) heart failure    Constipation, unspecified    Difficulty in walking, not elsewhere classified    Generalized muscle weakness    KARLA (obstructive sleep apnea)        Past Medical History:   Diagnosis Date    Arthritis     Back pain     Bilateral ankle pain     NEUROPATHY    Corns and callus     Coronary artery disease     \"STIFF HEART\" ON BB, NO CARDIOLOGIST, PCP (-)CP OR SOA    Diabetes mellitus type 2, controlled     BG RUNS IN 'S IN AM    Hammertoe     Heel pain     Hyperlipemia     Hypertension     Left foot pain     Lymphedema     NO PROBLEMS LATELY    Numbness in feet     Pulmonary embolism 2013    SADDLE PE, RESOLVED        Past Surgical History:   Procedure Laterality Date    BACK SURGERY  2021    S1-L1 FUSION, CYST REMOVED (TOTAL OF 3 SURGERY  TO )    CARPAL TUNNEL RELEASE Right     COLONOSCOPY      CYST REMOVAL      BACK    FOOT SURGERY Right     ACHELLIS TENDON/HAMMER TOE    HYDROCELECTOMY Left 2023    Procedure: HYDROCELECTOMY, left;  Surgeon: Nicky Robertson MD;  Location: [facility] MAIN OR;  Service: Urology;  Laterality: Left;    KNEE ARTHROSCOPY Right     DIAGNOSTIC    TEMPORAL ARTERY BIOPSY Right 2022    Procedure: Right temporal artery biopsy;  Surgeon: Wilbert Jacob MD;  Location: [facility] MAIN OR;  Service: Vascular;  Laterality: Right;         Visit Dx: " "     ICD-10-CM ICD-9-CM   1. Lymphedema  I89.0 457.1   2. Difficulty in walking, not elsewhere classified  R26.2 719.7   3. Generalized muscle weakness  M62.81 728.87        Lymphedema       Row Name 07/01/25 1000             Lymphedema Edema Assessment    Ptting Edema Category By grade out of 4  -JS      Pitting Edema + 1/4 (+1 of 4)  right  -JS      Stemmer Sign negative;bilateral:  -JS      Conejos Hump bilateral:;negative  -JS         Skin Changes/Observations    Location/Assessment Lower Extremity  -JS      Lower Extremity Conditions right:;left:;bilateral:;shiny;weeping;fragile  -JS      Lower Extremity Color/Pigment hyperpigmented;erythema;P'eau d'orange;bilateral:  -JS         Lymphedema Pulses/Capillary Refill    Capillary Refill lower extremity capillary refill  -JS      Lower Extremity Capillary Refill right:;left:;less than 3 seconds  -JS         Lymphedema Measurements    Measurement Type(s) Circumferential  -JS      Circumferential Areas Lower extremities  -JS         Compression/Skin Care    Compression/Skin Care skin care;wrapping location  -JS      Skin Care lotion applied;washed/dried;moisturizing lotion applied  -JS      Wrapping Location lower extremity  -JS      Wrapping Location LE bilateral:;foot to knee  -JS      Bandage Layers cotton liner;soft foam- 1/4 inch  -JS      Bandaging Comments 1/4\" foam applied at bilateral anterior ankles to build shape and improve comfort.  -JS      Compression/Skin Care Comments The patient’s skin was thoroughly cleaned, rinsed, and dried. A low pH lotion was applied to maintain skin integrity and prevent breakdown. Stockinet was applied, followed by a cotton liner. Short stretch bandages were then applied in a circumferential pattern with 50% overlap from the metatarsal heads to two finger widths below the knee, using moderate to strong compression to encourage lymphatic return. Bandages were applied carefully to ensure even distribution of pressure without " compromising circulation.  -JS                User Key  (r) = Recorded By, (t) = Taken By, (c) = Cosigned By      Initials Name Provider Type    Alex Fernandez OT Occupational Therapist                             OT Assessment/Plan       Row Name 07/01/25 1000          OT Assessment    Assessment Comments Patient is demonstrating excellent response to current treatment regimen, with positive feedback from both the patient and his wound care team. Continued OT is recommended to maintain reductions, support wound healing, and transition to long-term management strategies. No adverse effects were observed from current wrapping technique. Patient is appropriate for continued compression therapy and further progression toward garment fitting if stability is maintained.  -JS     Patient would benefit from skilled therapy intervention Yes  -JS        OT Plan    OT Plan Comments Continue POC  -JS               User Key  (r) = Recorded By, (t) = Taken By, (c) = Cosigned By      Initials Name Provider Type    Alex Fernandez OT Occupational Therapist                        Manual Rx (Last 36 Hours)       Manual Treatments       Row Name 07/01/25 1040             Total Minutes    35939 - OT Manual Therapy Minutes 30  -JS                User Key  (r) = Recorded By, (t) = Taken By, (c) = Cosigned By      Initials Name Provider Type    Alex Fernandez OT Occupational Therapist                      Therapy Education  Education Details: Patient was educated on the importance of consistent garment and bandage compliance to maintain therapeutic gains and reduce risk of re-accumulation of lymphatic fluid. Instruction was provided on recognizing signs and symptoms of decreased blood flow or potential vascular compromise, such as numbness, tingling, color change, or increased pain. The importance of continued occupational therapy participation for long-term management and limb preservation was also reinforced.  Given:  Symptoms/condition management, HEP  Program: Reinforced  How Provided: Verbal  Provided to: Patient  Level of Understanding: Verbalized  91173 - OT Self Care/Mgmt Minutes: 18                Time Calculation:   Timed Charges  65548 - OT Manual Therapy Minutes: 30  13616 - OT Self Care/Mgmt Minutes: 18  Total Minutes  Timed Charges Total Minutes: 48   Total Minutes: 48     Therapy Charges for Today       Code Description Service Date Service Provider Modifiers Qty    80422840489 HC OT MANUAL THERAPY EA 15 MIN 7/1/2025 Alex Cardenas OT GO 2    23096492444 HC OT SELF CARE/MGMT/TRAIN EA 15 MIN 7/1/2025 Alex Cardenas OT GO 1                        Alex Cardenas OT  7/1/2025

## 2025-07-02 ENCOUNTER — TELEPHONE (OUTPATIENT)
Dept: OCCUPATIONAL THERAPY | Facility: HOSPITAL | Age: 75
End: 2025-07-02
Payer: MEDICARE

## 2025-07-02 ENCOUNTER — HOSPITAL ENCOUNTER (OUTPATIENT)
Dept: OCCUPATIONAL THERAPY | Facility: HOSPITAL | Age: 75
Setting detail: THERAPIES SERIES
Discharge: HOME | End: 2025-07-02
Payer: MEDICARE

## 2025-07-02 DIAGNOSIS — R26.2 DIFFICULTY IN WALKING, NOT ELSEWHERE CLASSIFIED: ICD-10-CM

## 2025-07-02 DIAGNOSIS — I89.0 LYMPHEDEMA: Primary | ICD-10-CM

## 2025-07-02 DIAGNOSIS — M62.81 GENERALIZED MUSCLE WEAKNESS: ICD-10-CM

## 2025-07-02 PROCEDURE — 97535 SELF CARE MNGMENT TRAINING: CPT

## 2025-07-02 PROCEDURE — 97140 MANUAL THERAPY 1/> REGIONS: CPT

## 2025-07-02 NOTE — TELEPHONE ENCOUNTER
Please sign to allow patient to receive compression stockings to facilitate reduction in edema and lymphatic fluid to promote improved limb volume management, enhanced skin integrity, increased functional mobility, and overall reduction in risk for complications such as infection or skin breakdown.

## 2025-07-02 NOTE — THERAPY RE-EVALUATION
"Outpatient Occupational Therapy Lymphedema Re-Evaluation   Arnaldo     Patient Name: Jonas Mercado  : 1950  MRN: [id number]  Today's Date: 2025      Visit Date: 2025    Patient Active Problem List   Diagnosis    Lymphedema    Hyperlipemia    Hypertension    Temporal arteritis    Bilateral primary osteoarthritis of knee    BPH (benign prostatic hyperplasia)    Lumbar adjacent segment disease with spondylolisthesis    Spinal stenosis    Vitamin D deficiency    Major depressive disorder, recurrent, moderate    Type 2 diabetes mellitus without complication    Chronic diastolic (congestive) heart failure    Constipation, unspecified    Difficulty in walking, not elsewhere classified    Generalized muscle weakness    KARLA (obstructive sleep apnea)        Past Medical History:   Diagnosis Date    Arthritis     Back pain     Bilateral ankle pain     NEUROPATHY    Corns and callus     Coronary artery disease     \"STIFF HEART\" ON BB, NO CARDIOLOGIST, PCP (-)CP OR SOA    Diabetes mellitus type 2, controlled     BG RUNS IN 'S IN AM    Hammertoe     Heel pain     Hyperlipemia     Hypertension     Left foot pain     Lymphedema     NO PROBLEMS LATELY    Numbness in feet     Pulmonary embolism 2013    SADDLE PE, RESOLVED        Past Surgical History:   Procedure Laterality Date    BACK SURGERY  2021    S1-L1 FUSION, CYST REMOVED (TOTAL OF 3 SURGERY  TO )    CARPAL TUNNEL RELEASE Right     COLONOSCOPY      CYST REMOVAL      BACK    FOOT SURGERY Right     ACHELLIS TENDON/HAMMER TOE    HYDROCELECTOMY Left 2023    Procedure: HYDROCELECTOMY, left;  Surgeon: Nicky Robertson MD;  Location: [facility];  Service: Urology;  Laterality: Left;    KNEE ARTHROSCOPY Right     DIAGNOSTIC    TEMPORAL ARTERY BIOPSY Right 2022    Procedure: Right temporal artery biopsy;  Surgeon: Wilbert Jacob MD;  Location: [facility] MAIN OR;  Service: Vascular;  Laterality: Right;         Visit Dx: " "    ICD-10-CM ICD-9-CM   1. Lymphedema  I89.0 457.1   2. Difficulty in walking, not elsewhere classified  R26.2 719.7     The patient presented to the clinic with compression wraps in place from the previous session and verbalized satisfaction with the progress made in managing his lymphedema and wound status. The patient expressed appreciation for the visible improvements and continues to be motivated in treatment.           Lymphedema       Row Name 07/02/25 1000             Lymphedema Assessment    Lymphedema Classification LLE:;RLE:;secondary;stage 2 (Spontaneously Irreversible)  -JS         LLIS - Physical Concerns    The amount of pain associated with my lymphedema is: 1  -JS      The amount of limb heaviness associated with my lymphedema is: 1  -JS      The amount of skin tightness associated with my lymphedema is: 2  -JS      The size of my swollen limb(s) seems: 2  -JS      Lymphedema affects the movement of my swollen limb(s): 2  -JS      The strength in my swollen limb(s) is: 1  -JS         LLIS - Psychosocial Concerns    Lymphedema affects my body image (i.e., \"how I think I look\"). 1  -JS      Lymphedema affects my socializing with others. 0  -JS      Lymphedema affects my intimate relations with spouse or partner (rate 0 if not applicable 0  -JS      Lymphedema \"gets me down\" (i.e., depression, frustration, or anger) 1  -JS      I must rely on others for help due to my lymphedema. 1  -JS      I know what to do to manage my lymphedema 1  -JS         LLIS - Functional Concerns    Lymphedema affects my ability to perform self-care activities (i.e. eating, dressing, hygiene) 2  -JS      Lymphedema affects my ability to perform routine home or work-related activities. 1  -JS      Lymphedema affects my performance of preferred leisure activities. 1  -JS      Lymphedema affects proper fit of clothing/shoes 1  -JS      Lymphedema affects my sleep 1  -JS         Hand  Strength     Strength Affected Side " "Right  -JS         Lymphedema Edema Assessment    Ptting Edema Category By grade out of 4  -JS      Pitting Edema + 1/4 (+1 of 4);Mild  right  -JS      Stemmer Sign negative;bilateral:  -JS      Stewart Hump bilateral:;negative  -JS         Skin Changes/Observations    Location/Assessment Lower Extremity  -JS      Lower Extremity Conditions right:;left:;bilateral:;shiny;weeping;fragile  -JS      Lower Extremity Color/Pigment hyperpigmented;bilateral:;blanchable;right:;left:  -JS      Skin Observations Comment Skin integrity showed signs of overall improvement. The lower extremities were free of new wounds or complications. However, the patient continues to present with two open areas: one located on the distal aspect of the right lower extremity (RLE) and another on the third toe of the left lower extremity (LLE). Both areas were assessed, noted to be clean, and without signs of infection. The skin was thoroughly cleaned, rinsed, and dried. Low-pH lotion was applied prior to bandaging to maintain skin hydration and barrier function.  -JS         Lymphedema Sensation    Lymphedema Sensation Comments no concerns  -JS         Lymphedema Pulses/Capillary Refill    Capillary Refill lower extremity capillary refill  -JS      Lower Extremity Capillary Refill right:;left:;less than 3 seconds  -JS         Lymphedema Measurements    Measurement Type(s) Circumferential  -JS      Circumferential Areas Lower extremities  -JS         Compression/Skin Care    Compression/Skin Care skin care;wrapping location  -JS      Skin Care lotion applied;washed/dried;moisturizing lotion applied  -JS      Wrapping Location lower extremity  -JS      Wrapping Location LE bilateral:;foot to knee  -JS      Bandage Layers cotton liner;soft foam- 1/4 inch  -JS      Bandaging Comments 1/4\" foam applied at bilateral anterior ankles to build shape and improve comfort.  -JS      Compression/Skin Care Comments Therapist gently cleansed the two open wound " areas and applied non-stick pads for protection. Stockinet was applied over both lower extremities, followed by cotton liner and short stretch bandages. Bandages were applied in a circumferential pattern with 50% overlap, starting at the metatarsal heads and ending approximately two finger widths below the knee, delivering moderate to strong compression. The patient was measured this date for compression garments and will receive size 2 regular knee-high Juzo Soft compression stockings through [employer].  -JS         Lymphedema Life Impact Scale Totals    A.  Total Q1 - Q17 (Do not include Q18) 19  -JS      B.  Total number of questions answered (Q1-Q17) 17  -JS      C. Divide A by B 1.12  -JS      D. Multiple C by 25 28  -JS                User Key  (r) = Recorded By, (t) = Taken By, (c) = Cosigned By      Initials Name Provider Type    Alex Fernandez OT Occupational Therapist                            Therapy Education  Education Details: Patient was educated on the critical importance of consistent garment and bandage compliance to support edema control and wound healing. The therapist reviewed signs and symptoms of decreased blood flow or vascular compromise, including discoloration, numbness, or persistent pain, and instructed the patient to report any such issues promptly. The need for continued occupational therapy intervention for volume management, skin protection, and wound healing support was emphasized.  Given: Symptoms/condition management, HEP  Program: Reinforced  How Provided: Verbal  Provided to: Patient  Level of Understanding: Verbalized  91065 - OT Self Care/Mgmt Minutes: 20      Manual Rx (Last 36 Hours)       Manual Treatments       Row Name 07/02/25 0959             Total Minutes    75961 - OT Manual Therapy Minutes 25  -JS                User Key  (r) = Recorded By, (t) = Taken By, (c) = Cosigned By      Initials Name Provider Type    Alex Fernandez OT Occupational Therapist                        Circumferential Measurements:        Baseline: R: 2680.84 ml L: 2562.07 ml  % volume change: R: -26%   L: -36%     OT Assessment/Plan       Row Name 07/02/25 1000          OT Assessment    Assessment Comments Patient is demonstrating positive progress with current lymphedema management, as evidenced by improved skin integrity and maintained edema control. The presence of two remaining open wounds requires continued skilled OT intervention for wound protection, volume management, and prevention of complications. Patient remains an appropriate candidate for compression garment use. Therapy will continue to address wound care needs, maximize skin health, and support the transition to compression garments for long-term self-management.  -JS     OT Diagnosis Lymphedema of bilateral lower extremities with compromised skin integrity and open wounds.  -JS     OT Rehab Potential Good  -JS     Patient/caregiver participated in establishment of treatment plan and goals Yes  -JS     Patient would benefit from skilled therapy intervention Yes  -JS        OT Plan    OT Frequency 2x/week  -JS     Predicted Duration of Therapy Intervention (OT) 12 weeks  -JS     Planned Therapy Interventions (Optional Details) wound care  -JS     OT Plan Comments Continue POC  -JS               User Key  (r) = Recorded By, (t) = Taken By, (c) = Cosigned By      Initials Name Provider Type    Alex Fernandez OT Occupational Therapist                  Short-Term: Patient will achieve a 5% reduction in limb volume in the affected area as measured by circumferential measurements or perometry, within 30 days, to support improved lymphatic drainage. Initial: MET  Long-Term: Patient will achieve a 10% reduction in limb volume in the affected area as measured by circumferential measurements or perometry, within 90 days to promote optimal functional outcomes. Initial: Ongoing  TREATMENT:  Manual therapy, therapeutic exercise, therapeutic activity,  ADL retraining, Patient/caregiver education, Modalities: TENS, NMES, Ultrasound, Fluidotherapy Orthotic Management and Training, Wound dressing as needed.        Short-Term: Patient will verbalize understanding of the principles of self-MLD and accurately demonstrate basic self-MLD techniques with minimal cues in 3 out of 3 sessions within 30 days. Initial: MET  Long-Term: Patient will independently perform self-MLD techniques daily and report 90% adherence as documented in a self-care log over a 2-week period within 90 days.  Initial: MET  TREATMENT:  Manual therapy, therapeutic exercise, therapeutic activity, ADL retraining, Patient/caregiver education, Modalities: TENS, NMES, Ultrasound, Fluidotherapy Orthotic Management and Training, Wound dressing as needed.     Short-Term: Patient will don compression garments independently or with minimal assistance in 3 out of 5 observed trials within 30 days to promote adherence to lymphedema management.  Initial: Ongoing  Long-Term: Patient will independently don and doff compression garments daily with correct technique and report 100% compliance over a 2-week period within 90 days.  Initial: Ongoing  TREATMENT:  Manual therapy, therapeutic exercise, therapeutic activity, ADL retraining, Patient/caregiver education, Modalities: TENS, NMES, Ultrasound, Fluidotherapy Orthotic Management and Training, Wound dressing as needed.     Short-Term: Therapist will assess and ensure proper fit of compression garments and make necessary modifications or referrals within the first 30 days of treatment.  Initial: Ongoing  Long-Term: Therapist will reassess garment fit and function and provide modifications or replacement recommendations as needed within the 90-day treatment period.  Initial: Ongoing  TREATMENT:  Manual therapy, therapeutic exercise, therapeutic activity, ADL retraining, Patient/caregiver education, Modalities: TENS, NMES, Ultrasound, Fluidotherapy Orthotic Management  and Training, Wound dressing as needed.     Short-Term: Patient will identify at least 3 key components of lymphedema skin care and demonstrate proper daily hygiene and moisturizing routine in 2 out of 3 sessions within 30 days.  Initial: MET  Long-Term: Patient will consistently implement a skin care routine including inspection, hygiene, and moisturizing with no reported skin breakdown over 90 days.  Initial: MET  TREATMENT:  Manual therapy, therapeutic exercise, therapeutic activity, ADL retraining, Patient/caregiver education, Modalities: TENS, NMES, Ultrasound, Fluidotherapy Orthotic Management and Training, Wound dressing as needed.     Short-Term: Patient will demonstrate prescribed home exercise program (HEP) and lymphatic/venous flow exercises with correct technique in 2 out of 3 sessions within 30 days.  Initial: MET  Long-Term: Patient will demonstrate full HEP and lymphatic/venous flow exercises independently and report completing the routine at least 5 days per week over the previous 2 weeks within 90 days.  Initial: MET  TREATMENT:  Manual therapy, therapeutic exercise, therapeutic activity, ADL retraining, Patient/caregiver education, Modalities: TENS, NMES, Ultrasound, Fluidotherapy Orthotic Management and Training, Wound dressing as needed.     Short-Term:  Patient will demonstrate improved quality of life by achieving a 1-4 point reduction in total score on the Lymphedema Life Impact Scale within 30 days, indicating decreased physical, emotional, and functional impact of lymphedema.  Initial: MET  Long-Term:  Patient will demonstrate sustained improvement in quality of life by achieving a 3-5 point reduction in total score on the Lymphedema Life Impact Scale within 90 days, reflecting enhanced self-management and reduced impact of symptoms on daily functioning.  Initial: MET  TREATMENT:  Manual therapy, therapeutic exercise, therapeutic activity, ADL retraining, Patient/caregiver education,  Modalities: TENS, NMES, Ultrasound, Fluidotherapy Orthotic Management and Training, Wound dressing as needed.             Time Calculation:   Timed Charges  50758 - OT Manual Therapy Minutes: 25  69970 - OT Self Care/Mgmt Minutes: 20  Total Minutes  Timed Charges Total Minutes: 45   Total Minutes: 45     Therapy Charges for Today       Code Description Service Date Service Provider Modifiers Qty    56817883770 HC OT MANUAL THERAPY EA 15 MIN 7/2/2025 Alex Cardenas OT GO 2    13737923904 HC OT SELF CARE/MGMT/TRAIN EA 15 MIN 7/2/2025 Alex Cardenas OT GO 1                      Alex Cardenas OT  7/2/2025

## 2025-07-08 ENCOUNTER — HOSPITAL ENCOUNTER (OUTPATIENT)
Dept: OCCUPATIONAL THERAPY | Facility: HOSPITAL | Age: 75
Setting detail: THERAPIES SERIES
Discharge: HOME | End: 2025-07-08
Payer: MEDICARE

## 2025-07-08 DIAGNOSIS — I89.0 LYMPHEDEMA: Primary | ICD-10-CM

## 2025-07-08 DIAGNOSIS — M62.81 GENERALIZED MUSCLE WEAKNESS: ICD-10-CM

## 2025-07-08 DIAGNOSIS — R26.2 DIFFICULTY IN WALKING, NOT ELSEWHERE CLASSIFIED: ICD-10-CM

## 2025-07-08 PROCEDURE — 97535 SELF CARE MNGMENT TRAINING: CPT

## 2025-07-08 PROCEDURE — 97140 MANUAL THERAPY 1/> REGIONS: CPT

## 2025-07-08 NOTE — THERAPY TREATMENT NOTE
"Outpatient Occupational Therapy Lymphedema Treatment Note   Arnaldo     Patient Name: Jonas Mercado  : 1950  MRN: [id number]  Today's Date: 2025      Visit Date: 2025    Patient Active Problem List   Diagnosis    Lymphedema    Hyperlipemia    Hypertension    Temporal arteritis    Bilateral primary osteoarthritis of knee    BPH (benign prostatic hyperplasia)    Lumbar adjacent segment disease with spondylolisthesis    Spinal stenosis    Vitamin D deficiency    Major depressive disorder, recurrent, moderate    Type 2 diabetes mellitus without complication    Chronic diastolic (congestive) heart failure    Constipation, unspecified    Difficulty in walking, not elsewhere classified    Generalized muscle weakness    KARLA (obstructive sleep apnea)        Past Medical History:   Diagnosis Date    Arthritis     Back pain     Bilateral ankle pain     NEUROPATHY    Corns and callus     Coronary artery disease     \"STIFF HEART\" ON BB, NO CARDIOLOGIST, PCP (-)CP OR SOA    Diabetes mellitus type 2, controlled     BG RUNS IN 'S IN AM    Hammertoe     Heel pain     Hyperlipemia     Hypertension     Left foot pain     Lymphedema     NO PROBLEMS LATELY    Numbness in feet     Pulmonary embolism 2013    SADDLE PE, RESOLVED        Past Surgical History:   Procedure Laterality Date    BACK SURGERY  2021    S1-L1 FUSION, CYST REMOVED (TOTAL OF 3 SURGERY  TO )    CARPAL TUNNEL RELEASE Right     COLONOSCOPY      CYST REMOVAL      BACK    FOOT SURGERY Right     ACHELLIS TENDON/HAMMER TOE    HYDROCELECTOMY Left 2023    Procedure: HYDROCELECTOMY, left;  Surgeon: Nicky Robertson MD;  Location: [facility] MAIN OR;  Service: Urology;  Laterality: Left;    KNEE ARTHROSCOPY Right     DIAGNOSTIC    TEMPORAL ARTERY BIOPSY Right 2022    Procedure: Right temporal artery biopsy;  Surgeon: Wilbert Jacob MD;  Location: [facility] MAIN OR;  Service: Vascular;  Laterality: Right;         Visit Dx: "      ICD-10-CM ICD-9-CM   1. Lymphedema  I89.0 457.1   2. Generalized muscle weakness  M62.81 728.87   3. Difficulty in walking, not elsewhere classified  R26.2 719.7        Lymphedema       Row Name 07/08/25 0900             Subjective Pain    Able to rate subjective pain? yes  -MP      Pre-Treatment Pain Level 0  -MP      Post-Treatment Pain Level 0  -MP      Subjective Pain Comment Jonas denies pain  -MP         Subjective    Subjective Comments Patient states a near fall when he lost balance coming down the steps.  -MP         Lymphedema Assessment    Lymphedema Classification LLE:;RLE:;secondary;stage 2 (Spontaneously Irreversible)  -MP         Lymphedema Edema Assessment    Ptting Edema Category By grade out of 4  -MP      Pitting Edema + 1/4 (+1 of 4);Mild  right  -MP      Stemmer Sign negative;bilateral:  -MP      [place] Hump bilateral:;negative  -MP         Skin Changes/Observations    Location/Assessment Lower Extremity  -MP      Lower Extremity Conditions right:;left:;bilateral:;shiny;weeping;fragile  -MP      Lower Extremity Color/Pigment hyperpigmented;bilateral:;blanchable;right:;left:  -MP      Skin Observations Comment Blood blister continues to be present at the great toe left foot which has become more liquid filled and soft with dark color to it.  -MP         Lymphedema Pulses/Capillary Refill    Capillary Refill lower extremity capillary refill  -MP      Lower Extremity Capillary Refill right:;left:;less than 3 seconds  -MP         Lymphedema Measurements    Measurement Type(s) Circumferential  -MP      Circumferential Areas Lower extremities  -MP         Compression/Skin Care    Compression/Skin Care skin care;wrapping location  -MP      Skin Care lotion applied;washed/dried;moisturizing lotion applied  -MP      Wrapping Location lower extremity  -MP      Wrapping Location LE bilateral:;foot to knee  -MP      Bandage Layers cotton liner;soft foam- 1/4 inch  -MP      Compression/Skin Care  Comments Therapist cleaned the right anterior shin wound with sterile saline and gauze and covered with Aquacel Ag prior to compression wrapping.  Left great toe was covered with/padded utilizing rolled gauze to prevent further injury.  -MP                User Key  (r) = Recorded By, (t) = Taken By, (c) = Cosigned By      Initials Name Provider Type    Marilu Nielsen OT Occupational Therapist                     OT Assessment/Plan       Row Name 07/08/25 0918          OT Assessment    Assessment Comments Mr. Mercado is doing well, with excellent reduction noted at last treatment session.  He is scheduled to see the wound care center next week for assessment of the left great toe and also has a wound at the right shin which will be assessed at the same time.  Patient did have a near fall this morning, but states that he does not believe it was because of the short stretch bandaging.  He may benefit from use of Accu wrap 2 layer compression system if he feels his balance does begin to be affected and, considering that he has reached a plateau and is currently awaiting permanent garments, he may benefit from reducing frequency to 1 time a week at that point.  He requires ongoing skilled OT until a plateau in lymph volume is acheived, permanent garments are obtained and he is independent with complete decongestive therapy.  -MP               User Key  (r) = Recorded By, (t) = Taken By, (c) = Cosigned By      Initials Name Provider Type    Marilu Nielsen OT Occupational Therapist                    Manual Rx (Last 36 Hours)       Manual Treatments       Row Name 07/08/25 1255             Total Minutes    50098 - OT Manual Therapy Minutes 43  -MP                User Key  (r) = Recorded By, (t) = Taken By, (c) = Cosigned By      Initials Name Provider Type    Marilu Nielsen OT Occupational Therapist                  Therapy Education  Education Details: Mr. Mercado is asked to continue to be diligent with  maintaining compression, HEP and elevation of legs between activities.  Occupational therapist communicated with the wound care center to alert of visual change at the left great toe.  Patient's current appointment was not until July 22, but was able to be pushed up to be next week.  Given: Symptoms/condition management, HEP  Program: Reinforced  How Provided: Verbal  Provided to: Patient  Level of Understanding: Verbalized  31822 - OT Self Care/Mgmt Minutes: 17      Time Calculation:   Timed Charges  81673 - OT Manual Therapy Minutes: 43  44192 - OT Self Care/Mgmt Minutes: 17  Total Minutes  Timed Charges Total Minutes: 60   Total Minutes: 60     Therapy Charges for Today       Code Description Service Date Service Provider Modifiers Qty    84441434313 HC OT MANUAL THERAPY EA 15 MIN 7/8/2025 Marilu Rosenberg OT GO 3    18420740085 HC OT SELF CARE/MGMT/TRAIN EA 15 MIN 7/8/2025 Marilu Rosenberg OT GO 1              Marilu Rosenberg OT  7/8/2025   Well nourished

## 2025-07-09 ENCOUNTER — TELEPHONE (OUTPATIENT)
Dept: SLEEP MEDICINE | Facility: HOSPITAL | Age: 75
End: 2025-07-09
Payer: MEDICARE

## 2025-07-09 NOTE — TELEPHONE ENCOUNTER
[employer] called stating he has called [employer] in [place] about his machine shutting off on Monday and has not received a response. He has called back to them and says it goes directly to voicemail.  His machine shuts off randomly at night waking him up, each time he is restarting machine. Emailed [facility] in [place] to have them reach out to him. Will contact patient with outcome.

## 2025-07-11 ENCOUNTER — HOSPITAL ENCOUNTER (OUTPATIENT)
Dept: OCCUPATIONAL THERAPY | Facility: HOSPITAL | Age: 75
Setting detail: THERAPIES SERIES
Discharge: HOME | End: 2025-07-11
Payer: MEDICARE

## 2025-07-11 DIAGNOSIS — I89.0 LYMPHEDEMA: Primary | ICD-10-CM

## 2025-07-11 DIAGNOSIS — R26.2 DIFFICULTY IN WALKING, NOT ELSEWHERE CLASSIFIED: ICD-10-CM

## 2025-07-11 PROCEDURE — 97140 MANUAL THERAPY 1/> REGIONS: CPT

## 2025-07-11 PROCEDURE — 97535 SELF CARE MNGMENT TRAINING: CPT

## 2025-07-11 NOTE — THERAPY TREATMENT NOTE
"Outpatient Occupational Therapy Lymphedema Treatment Note   Arnaldo     Patient Name: Jonas Mercado  : 1950  MRN: [id number]  Today's Date: 2025      Visit Date: 2025    Patient Active Problem List   Diagnosis    Lymphedema    Hyperlipemia    Hypertension    Temporal arteritis    Bilateral primary osteoarthritis of knee    BPH (benign prostatic hyperplasia)    Lumbar adjacent segment disease with spondylolisthesis    Spinal stenosis    Vitamin D deficiency    Major depressive disorder, recurrent, moderate    Type 2 diabetes mellitus without complication    Chronic diastolic (congestive) heart failure    Constipation, unspecified    Difficulty in walking, not elsewhere classified    Generalized muscle weakness    KARLA (obstructive sleep apnea)        Past Medical History:   Diagnosis Date    Arthritis     Back pain     Bilateral ankle pain     NEUROPATHY    Corns and callus     Coronary artery disease     \"STIFF HEART\" ON BB, NO CARDIOLOGIST, PCP (-)CP OR SOA    Diabetes mellitus type 2, controlled     BG RUNS IN 'S IN AM    Hammertoe     Heel pain     Hyperlipemia     Hypertension     Left foot pain     Lymphedema     NO PROBLEMS LATELY    Numbness in feet     Pulmonary embolism 2013    SADDLE PE, RESOLVED        Past Surgical History:   Procedure Laterality Date    BACK SURGERY  2021    S1-L1 FUSION, CYST REMOVED (TOTAL OF 3 SURGERY  TO )    CARPAL TUNNEL RELEASE Right     COLONOSCOPY      CYST REMOVAL      BACK    FOOT SURGERY Right     ACHELLIS TENDON/HAMMER TOE    HYDROCELECTOMY Left 2023    Procedure: HYDROCELECTOMY, left;  Surgeon: Nicky Robertson MD;  Location: [facility] MAIN OR;  Service: Urology;  Laterality: Left;    KNEE ARTHROSCOPY Right     DIAGNOSTIC    TEMPORAL ARTERY BIOPSY Right 2022    Procedure: Right temporal artery biopsy;  Surgeon: Wilbert Jacob MD;  Location: [facility] MAIN OR;  Service: Vascular;  Laterality: Right;         Visit " Dx:      ICD-10-CM ICD-9-CM   1. Lymphedema  I89.0 457.1   2. Difficulty in walking, not elsewhere classified  R26.2 719.7        Lymphedema       Row Name 07/11/25 1100             Lymphedema Edema Assessment    Ptting Edema Category By grade out of 4  -JS      Pitting Edema + 1/4 (+1 of 4);Mild  right  -JS      Stemmer Sign negative;bilateral:  -JS      [place] Hump bilateral:;negative  -JS         Skin Changes/Observations    Location/Assessment Lower Extremity  -JS      Lower Extremity Conditions right:;left:;bilateral:;shiny;weeping;fragile  -JS      Lower Extremity Color/Pigment hyperpigmented;bilateral:;blanchable;right:;left:  -JS         Lymphedema Pulses/Capillary Refill    Capillary Refill lower extremity capillary refill  -JS      Lower Extremity Capillary Refill right:;left:;less than 3 seconds  -JS         Lymphedema Measurements    Measurement Type(s) Circumferential  -JS      Circumferential Areas Lower extremities  -JS         Compression/Skin Care    Compression/Skin Care skin care;wrapping location  -JS      Skin Care lotion applied;washed/dried;moisturizing lotion applied  -JS      Wrapping Location lower extremity  -JS      Wrapping Location LE bilateral:;foot to knee  -JS      Bandage Layers cotton liner;soft foam- 1/4 inch  -JS      Bandaging Comments Therapist thoroughly cleansed the patient's lower extremity, including the third toe on the left lower extremity (LLE), which was noted to be the site of the ruptured hematoma. No signs of active bleeding were present, and the underlying skin appeared intact. The site was disinfected with Betadine and protected with a bandage. stockinet over the entire lower leg. Low-pH lotion was used prior to wrapping to promote skin hydration and protect against irritation. Short-stretch compression bandages were applied in a circumferential pattern with 50% overlap from the metatarsal heads to two finger widths below the knee, using moderate to strong  compression. Wrapping included careful coverage and support of the affected toe. Patient tolerated all interventions without issue.  -JS                User Key  (r) = Recorded By, (t) = Taken By, (c) = Cosigned By      Initials Name Provider Type    Alex Fernandez OT Occupational Therapist                             OT Assessment/Plan       Row Name 07/11/25 1100          OT Assessment    Assessment Comments Patient continues to demonstrate good clinical progress with reduced swelling, improved skin integrity, and effective self-reporting of changes in condition. The ruptured hematoma site appears clean and intact without signs of infection. Patient is engaged, adherent to treatment protocols, and has verbalized readiness for discharge due to an upcoming relocation. Awaiting prescription compression garment order. Patient remains an appropriate candidate for discharge following the next session, provided condition remains stable and education is complete. Skilled OT remains medically necessary through the final session to reinforce self-management, finalize garment use, and transition the patient safely to home care.  -JS     Patient would benefit from skilled therapy intervention Yes  -JS        OT Plan    OT Plan Comments continue POC  -JS               User Key  (r) = Recorded By, (t) = Taken By, (c) = Cosigned By      Initials Name Provider Type    Alex Fernandez OT Occupational Therapist                     Circumferential Measurements:        Baseline: R: 2680.84 ml L: 2562.07 ml  % volume change: R: -27%   L: -40%         Manual Rx (Last 36 Hours)       Manual Treatments       Row Name 07/11/25 1142             Total Minutes    38279 - OT Manual Therapy Minutes 23  -JS                User Key  (r) = Recorded By, (t) = Taken By, (c) = Cosigned By      Initials Name Provider Type    Alex Fernandez OT Occupational Therapist                      Therapy Education  Education Details: Patient was educated  on the importance of continued compliance with compression bandaging and/or garments to support lymphedema management, reduce swelling, and protect skin integrity. Signs and symptoms of decreased blood flow or vascular compromise were reviewed, including increased pain, discoloration, coldness, numbness, or tingling. Patient was advised to remove the bandage and seek medical evaluation if any of these symptoms occur. Education was also provided regarding available over-the-counter compression socks that may be used as an interim solution until custom stockings are received. The patient was advised on the importance of continued self-monitoring and adherence to a lymphedema maintenance routine following discharge from occupational therapy.  Given: Symptoms/condition management, HEP  Program: Reinforced  How Provided: Verbal  Provided to: Patient  Level of Understanding: Verbalized  34376 - OT Self Care/Mgmt Minutes: 32                Time Calculation:   Timed Charges  40612 - OT Manual Therapy Minutes: 23  80109 - OT Self Care/Mgmt Minutes: 32  Total Minutes  Timed Charges Total Minutes: 55   Total Minutes: 55     Therapy Charges for Today       Code Description Service Date Service Provider Modifiers Qty    89003504808  OT MANUAL THERAPY EA 15 MIN 7/11/2025 Alex Cardenas OT GO 2    20845394055  OT SELF CARE/MGMT/TRAIN EA 15 MIN 7/11/2025 Alex Cardenas OT GO 2                        Alex Cardenas OT  7/11/2025

## 2025-07-14 ENCOUNTER — OFFICE VISIT (OUTPATIENT)
Dept: WOUND CARE | Facility: HOSPITAL | Age: 75
End: 2025-07-14
Payer: MEDICARE

## 2025-07-14 ENCOUNTER — HOSPITAL ENCOUNTER (OUTPATIENT)
Dept: OCCUPATIONAL THERAPY | Facility: HOSPITAL | Age: 75
Setting detail: THERAPIES SERIES
Discharge: HOME | End: 2025-07-14
Payer: MEDICARE

## 2025-07-14 VITALS
DIASTOLIC BLOOD PRESSURE: 70 MMHG | HEART RATE: 84 BPM | TEMPERATURE: 97.4 F | RESPIRATION RATE: 20 BRPM | SYSTOLIC BLOOD PRESSURE: 119 MMHG

## 2025-07-14 DIAGNOSIS — M62.81 GENERALIZED MUSCLE WEAKNESS: ICD-10-CM

## 2025-07-14 DIAGNOSIS — M17.11 OSTEOARTHRITIS OF RIGHT KNEE, UNSPECIFIED OSTEOARTHRITIS TYPE: ICD-10-CM

## 2025-07-14 DIAGNOSIS — S81.802D TRAUMATIC OPEN WOUND OF LEFT LOWER LEG, SUBSEQUENT ENCOUNTER: ICD-10-CM

## 2025-07-14 DIAGNOSIS — M25.461 SWELLING OF RIGHT KNEE: ICD-10-CM

## 2025-07-14 DIAGNOSIS — I89.0 LYMPHEDEMA: Primary | ICD-10-CM

## 2025-07-14 DIAGNOSIS — S91.105A OPEN WOUND OF THIRD TOE OF LEFT FOOT, INITIAL ENCOUNTER: ICD-10-CM

## 2025-07-14 DIAGNOSIS — S81.801D TRAUMATIC OPEN WOUND OF RIGHT LOWER LEG, SUBSEQUENT ENCOUNTER: Primary | ICD-10-CM

## 2025-07-14 DIAGNOSIS — R26.2 DIFFICULTY IN WALKING, NOT ELSEWHERE CLASSIFIED: ICD-10-CM

## 2025-07-14 DIAGNOSIS — L89.892 PRESSURE INJURY OF TOE OF LEFT FOOT, STAGE 2: ICD-10-CM

## 2025-07-14 DIAGNOSIS — S51.811A: ICD-10-CM

## 2025-07-14 DIAGNOSIS — S91.101A OPEN WOUND OF RIGHT GREAT TOE, INITIAL ENCOUNTER: ICD-10-CM

## 2025-07-14 PROCEDURE — 1159F MED LIST DOCD IN RCRD: CPT | Performed by: EMERGENCY MEDICINE

## 2025-07-14 PROCEDURE — 3078F DIAST BP <80 MM HG: CPT | Performed by: EMERGENCY MEDICINE

## 2025-07-14 PROCEDURE — 97535 SELF CARE MNGMENT TRAINING: CPT

## 2025-07-14 PROCEDURE — 99214 OFFICE O/P EST MOD 30 MIN: CPT | Performed by: EMERGENCY MEDICINE

## 2025-07-14 PROCEDURE — 97140 MANUAL THERAPY 1/> REGIONS: CPT

## 2025-07-14 PROCEDURE — 1160F RVW MEDS BY RX/DR IN RCRD: CPT | Performed by: EMERGENCY MEDICINE

## 2025-07-14 PROCEDURE — G0463 HOSPITAL OUTPT CLINIC VISIT: HCPCS | Performed by: EMERGENCY MEDICINE

## 2025-07-14 PROCEDURE — 3074F SYST BP LT 130 MM HG: CPT | Performed by: EMERGENCY MEDICINE

## 2025-07-14 NOTE — THERAPY TREATMENT NOTE
"Outpatient Occupational Therapy Lymphedema Treatment Note   Arnaldo     Patient Name: Jonas Mercado  : 1950  MRN: [id number]  Today's Date: 2025      Visit Date: 2025    Patient Active Problem List   Diagnosis    Lymphedema    Hyperlipemia    Hypertension    Temporal arteritis    Bilateral primary osteoarthritis of knee    BPH (benign prostatic hyperplasia)    Lumbar adjacent segment disease with spondylolisthesis    Spinal stenosis    Vitamin D deficiency    Major depressive disorder, recurrent, moderate    Type 2 diabetes mellitus without complication    Chronic diastolic (congestive) heart failure    Constipation, unspecified    Difficulty in walking, not elsewhere classified    Generalized muscle weakness    KARLA (obstructive sleep apnea)        Past Medical History:   Diagnosis Date    Arthritis     Back pain     Bilateral ankle pain     NEUROPATHY    Corns and callus     Coronary artery disease     \"STIFF HEART\" ON BB, NO CARDIOLOGIST, PCP (-)CP OR SOA    Diabetes mellitus type 2, controlled     BG RUNS IN 'S IN AM    Hammertoe     Heel pain     Hyperlipemia     Hypertension     Left foot pain     Lymphedema     NO PROBLEMS LATELY    Numbness in feet     Pulmonary embolism 2013    SADDLE PE, RESOLVED        Past Surgical History:   Procedure Laterality Date    BACK SURGERY  2021    S1-L1 FUSION, CYST REMOVED (TOTAL OF 3 SURGERY  TO )    CARPAL TUNNEL RELEASE Right     COLONOSCOPY      CYST REMOVAL      BACK    FOOT SURGERY Right     ACHELLIS TENDON/HAMMER TOE    HYDROCELECTOMY Left 2023    Procedure: HYDROCELECTOMY, left;  Surgeon: Nicky Robertson MD;  Location: [facility] MAIN OR;  Service: Urology;  Laterality: Left;    KNEE ARTHROSCOPY Right     DIAGNOSTIC    TEMPORAL ARTERY BIOPSY Right 2022    Procedure: Right temporal artery biopsy;  Surgeon: Wilbert Jacob MD;  Location: [facility] MAIN OR;  Service: Vascular;  Laterality: Right;         Visit " Dx:      ICD-10-CM ICD-9-CM   1. Lymphedema  I89.0 457.1   2. Difficulty in walking, not elsewhere classified  R26.2 719.7   3. Generalized muscle weakness  M62.81 728.87        Lymphedema       Row Name 07/14/25 1200             Subjective Pain    Able to rate subjective pain? yes  -MP      Pre-Treatment Pain Level 0  -MP      Post-Treatment Pain Level 0  -MP      Subjective Pain Comment Jonas denies pain.  -MP         Subjective    Subjective Comments Donals purchase temporary stockings to use until permanent garments. He just saw Dr. Berrios, who is discharging him from wound care today.  He states he is happy with his treatment there and here as well.  -MP         Lymphedema Assessment    Lymphedema Classification LLE:;RLE:;secondary;stage 2 (Spontaneously Irreversible)  -MP         Lymphedema Edema Assessment    Ptting Edema Category By grade out of 4  -MP      Pitting Edema + 1/4 (+1 of 4);Mild  right  -MP      Stemmer Sign negative;bilateral:  -MP      [place] Hump bilateral:;negative  -MP         Skin Changes/Observations    Location/Assessment Lower Extremity  -MP      Lower Extremity Conditions right:;left:;bilateral:;shiny;weeping;fragile  -MP      Lower Extremity Color/Pigment hyperpigmented;bilateral:;blanchable;right:;left:  -MP         Lymphedema Sensation    Lymphedema Sensation Comments no concerns  -MP         Lymphedema Pulses/Capillary Refill    Capillary Refill lower extremity capillary refill  -MP      Lower Extremity Capillary Refill right:;left:;less than 3 seconds  -MP         Lymphedema Measurements    Measurement Type(s) Circumferential  -MP      Circumferential Areas Lower extremities  -MP         Compression/Skin Care    Compression/Skin Care compression garment  -MP      Skin Care --  -MP      Wrapping Location --  -MP      Wrapping Location LE --  -MP      Bandage Layers --  -MP      Compression/Skin Care Comments 15-20 mmHg compression garments were donned as temporary garments until  permanent compression garments arrive.  See education section  -MP                User Key  (r) = Recorded By, (t) = Taken By, (c) = Cosigned By      Initials Name Provider Type    Marilu Nielsen OT Occupational Therapist                        Manual Rx (Last 36 Hours)       Manual Treatments       Row Name 07/14/25 1608             Total Minutes    94326 - OT Manual Therapy Minutes 15  -MP                User Key  (r) = Recorded By, (t) = Taken By, (c) = Cosigned By      Initials Name Provider Type    Marilu Nielsen OT Occupational Therapist                      Therapy Education  Education Details: Educated regarding proper donning technique and use of assistive devices for donning of compression stockings was provided to patient and wife.  Wife will be helping with donning of garments at home.  Patient was asked to don garments daily in the morning and remove at night prior to bed.  Spouse did don compression garments properly after OT instructed in technique.  They were asked to call this therapist if an increase in volume does occur prior to receiving of permanent compression garments.  Patient was provided information regarding precautions for any shortness of air or increase in pain, especially any chest pain.  He is aware.  Since therapist is continuing to wait for order for permanent compression garments, patient would like to wear these garments until permanent compression garments to arrive.  Occupational therapist requested that patient and his wife request signing of compression garment order when at primary care physician's office today and they were agreeable.  Given: Symptoms/condition management, HEP  Program: Reinforced  How Provided: Verbal  Provided to: Patient  Level of Understanding: Verbalized  12300 - OT Self Care/Mgmt Minutes: 43             07/14/25 1609   OT Assessment   Assessment Comments Jonas and wife are independent to don current compression garments which are temporary  until permanent compression garments arrive.  He will call this therapist when permanent compression garments to arrive to let her know if they fit well or if further treatment is needed.  OT will proceed with discharge if appropriate at that time.  If not, patient will resume occupational therapy for any treatment needed prior to discharge.         Time Calculation:   Timed Charges  50161 - OT Manual Therapy Minutes: 15  54944 - OT Self Care/Mgmt Minutes: 43  Total Minutes  Timed Charges Total Minutes: 58   Total Minutes: 58     Therapy Charges for Today       Code Description Service Date Service Provider Modifiers Qty    94577058390 HC OT MANUAL THERAPY EA 15 MIN 7/14/2025 Marilu Rosenberg OT GO 1    95567138724 HC OT SELF CARE/MGMT/TRAIN EA 15 MIN 7/14/2025 Marilu Rosenberg OT GO 3            Marilu Rosenberg OT  7/14/2025

## 2025-07-14 NOTE — PROGRESS NOTES
Chief Complaint  Wound Check (Pt is here for a bilateral leg, right arm, and facial wound check. Currently sees lymphedema twice weekly.  No complaints of pain.)    Subjective      History of Present Illness    Jonas Mercado  is a 74 y.o. male     History of Present Illness  The patient is a 74-year-old male here for a recheck of multiple wounds.    He reports an improvement in his condition, with a reduction in knee swelling. He has been under the care of Dr. Wang, an orthopedic specialist. He is scheduled to see a nurse practitioner in September 2025 for a knee injection, which he finds beneficial. He also mentions that he will be moving to [place] soon.    He sustained an injury to his left third toe in the shower recently, which resulted in bleeding when the bandage was removed on Friday. He has been applying Betadine to the wound. He has not been wearing shoes until last week and was advised by his podiatrist to use rubber toe covers for cushioning. He has been using Vaseline or Aquaphor on his dry skin. He is scheduled to visit the lymphedema clinic after this appointment.    MEDICATIONS  Current: Betadine    Allergies:  Aspirin      Current Outpatient Medications:     albuterol sulfate  (90 Base) MCG/ACT inhaler, 1 puff Every 6 (Six) Hours As Needed., Disp: , Rfl:     allopurinol (ZYLOPRIM) 300 MG tablet, Take 1 tablet by mouth Daily., Disp: 90 tablet, Rfl: 0    atorvastatin (LIPITOR) 20 MG tablet, Take 1 tablet by mouth Daily., Disp: 90 tablet, Rfl: 0    bumetanide (BUMEX) 1 MG tablet, Take 1 tablet by mouth Daily., Disp: , Rfl:     Diclofenac Sodium (VOLTAREN) 1 % gel gel, Apply 4 g topically to the appropriate area as directed 4 (Four) Times a Day As Needed., Disp: , Rfl:     DULoxetine (CYMBALTA) 60 MG capsule, Take 1 capsule by mouth Daily., Disp: , Rfl:     Jardiance 10 MG tablet tablet, Take 1 tablet by mouth Daily., Disp: , Rfl:     lidocaine (LIDODERM) 5 %, Place 1 patch on the skin  "as directed by provider Daily., Disp: , Rfl:     lisinopril (PRINIVIL,ZESTRIL) 10 MG tablet, , Disp: , Rfl:     metFORMIN (GLUCOPHAGE) 500 MG tablet, Take 2 tablets by mouth 2 (Two) Times a Day With Meals. INSTRUCTED PER ANESTHESIA STANDING ORDERS, Disp: , Rfl:     metOLazone (ZAROXOLYN) 5 MG tablet, Take 1 tablet by mouth Daily., Disp: , Rfl:     metoprolol succinate XL (TOPROL-XL) 200 MG 24 hr tablet, Take 1 tablet by mouth Daily., Disp: 90 tablet, Rfl: 0    potassium chloride (KLOR-CON M10) 10 MEQ CR tablet, Take 1 tablet by mouth Daily., Disp: , Rfl:     pregabalin (LYRICA) 150 MG capsule, Take 1 capsule by mouth 2 (Two) Times a Day., Disp: 180 capsule, Rfl: 0    Semaglutide, 2 MG/DOSE, (Ozempic, 2 MG/DOSE,) 8 MG/3ML solution pen-injector, Inject 2 mg under the skin into the appropriate area as directed., Disp: , Rfl:     silver sulfadiazine (SILVADENE, SSD) 1 % cream, Apply pea sized amount to affected area twice daiy as needed., Disp: , Rfl:     tamsulosin (FLOMAX) 0.4 MG capsule 24 hr capsule, Take 1 capsule by mouth Daily., Disp: , Rfl:     traMADol (ULTRAM) 50 MG tablet, Take 1 tablet by mouth., Disp: , Rfl:     Vibegron (Gemtesa) 75 MG tablet, Take 1 tablet by mouth Daily., Disp: 28 tablet, Rfl: 0    Vibegron (Gemtesa) 75 MG tablet, Take 1 tablet by mouth Daily., Disp: 42 tablet, Rfl: 0    Vibegron 75 MG tablet, Take 1 tablet by mouth Daily., Disp: 42 tablet, Rfl: 0    Past Medical History:   Diagnosis Date    Arthritis     Back pain     Bilateral ankle pain     NEUROPATHY    Corns and callus     Coronary artery disease     \"STIFF HEART\" ON BB, NO CARDIOLOGIST, PCP (-)CP OR SOA    Diabetes mellitus type 2, controlled     BG RUNS IN 'S IN AM    Hammertoe     Heel pain     Hyperlipemia     Hypertension     Left foot pain     Lymphedema     NO PROBLEMS LATELY    Numbness in feet     Pulmonary embolism 2013    SADDLE PE, RESOLVED     Past Surgical History:   Procedure Laterality Date    BACK SURGERY  " 2021    S1-L1 FUSION, CYST REMOVED (TOTAL OF 3 SURGERY  TO )    CARPAL TUNNEL RELEASE Right     COLONOSCOPY  2014    CYST REMOVAL      BACK    FOOT SURGERY Right     ACHELLIS TENDON/HAMMER TOE    HYDROCELECTOMY Left 2023    Procedure: HYDROCELECTOMY, left;  Surgeon: Nicky Robertson MD;  Location: [facility];  Service: Urology;  Laterality: Left;    KNEE ARTHROSCOPY Right     DIAGNOSTIC    TEMPORAL ARTERY BIOPSY Right 2022    Procedure: Right temporal artery biopsy;  Surgeon: Wilbert Jacob MD;  Location: [facility] OR;  Service: Vascular;  Laterality: Right;     Social History     Socioeconomic History    Marital status:    Tobacco Use    Smoking status: Former     Current packs/day: 0.00     Average packs/day: 0.5 packs/day for 4.1 years (2.0 ttl pk-yrs)     Types: Cigarettes     Start date:      Quit date: 1998     Years since quittin.4    Smokeless tobacco: Never   Vaping Use    Vaping status: Never Used   Substance and Sexual Activity    Alcohol use: Not Currently     Comment: VERY RARE    Drug use: Never    Sexual activity: Defer           Objective     Vitals:    25 0939   BP: 119/70   Pulse: 84   Resp: 20  Comment: 95% on 3 L   Temp: 97.4 °F (36.3 °C)   TempSrc: Temporal   PainSc: 0-No pain     There is no height or weight on file to calculate BMI.    STEADI Fall Risk Assessment has not been completed.     Review of Systems     ROS:  Per HPI.     I have reviewed the HPI and ROS as documented by MA/RN. Kendra Berrios MD    Physical Exam     NAD  AAOx3, pleasant, cooperative    Physical Exam  Wounds on the skin are almost fully healed except for the dorsal left third toe which has gotten larger and exhibits a thin layer of slough as well as granulation tissue. No swelling or evidence of infection. The left great toe has a black eschar along the distal lateral plantar toe which is stable and improving without evidence of infection. Small shallow round  ulceration dorsal right great toe along the medial IP joint. One small shallow residual wound with eschar along the distal right lower leg. Other lower leg wounds are healed. Healing wounds of bilateral knees. Dry skin is improving.  The large skin tear of the right dorsal forearm has essentially healed with just a few small excoriations still present.   LLE:                RLE:        RUE:        Result Review :  The following data was reviewed by: Kendra Berrios MD on 07/14/2025:    Prior notes and images.               Assessment and Plan   Diagnoses and all orders for this visit:    1. Traumatic open wound of right lower leg, subsequent encounter (Primary)    2. Traumatic open wound of left lower leg, subsequent encounter    3. Open wound of third toe of left foot, initial encounter    4. ISTAP type 1 skin tear of right forearm    5. Swelling of right knee    6. Osteoarthritis of right knee, unspecified osteoarthritis type    7. Open wound of right great toe, initial encounter    8. Pressure injury of toe of left foot, stage 2        Assessment & Plan  1. Multiple wounds.  The patient's wounds are almost fully healed except for the dorsal left third toe, which has gotten larger and exhibits a thin layer of slough as well as granulation tissue. There is no swelling or evidence of infection. The left great toe has a black eschar along the distal lateral plantar toe, which is stable and improving without evidence of infection. There is a small shallow round ulceration on the dorsal right great toe along the medial IP joint and one small shallow residual wound with eschar along the distal right lower leg. Other lower leg wounds are healed, and there are healing wounds on both knees. Dry skin is improving. Betadine will be applied to the eschar on the left great toe and the small wound on the right great toe. A small piece of PolyMem silver will be cut for the left third toe and secured with a Band-Aid. This  treatment should be performed daily.  He will come back as needed because they are moving to [place] and plan to get into wound care there. They will contact us if they can not get in up there.    2. Arthritis of the right knee.  The x-ray of the right knee showed arthritis in all quadrants of the knee. He is advised to follow up with Dr. Carson, an orthopedic doctor, to review the x-rays and consider options such as an injection to alleviate symptoms. He is also advised to rest, elevate, and ice the knee as needed.    3. Lymphedema.  He is going to the lymphedema clinic after this visit. He is advised to continue using lotion, massage, and compression socks as part of his long-term management plan.    Patient will contact us if he wishes to schedule another follow-up appointment.  They are moving further away and will do looking to establish care with a wound clinic closer to their new home.      Patient was given instructions and counseling regarding their condition or for health maintenance advice, as well as the wound care plan and recommendations. They understand and agree with the plan.  They will follow back up here in the clinic but are instructed to contact us in the interim should they have any new, returning, or worsening symptoms or concerns. Please see specific information pulled into the AVS if appropriate.     Dragon Dictation utilized for chart completion.    I spent 32 minutes in both face-to-face and nonface-to-face time for patient care today including, but not limited to, review of old records, reviewing old images, history and physical exam, reviewing test results, counseling patient and family, coordinating care, and documenting.      Follow Up   Return if symptoms worsen or fail to improve.      Kendra Berrios MD    Patient or patient representative verbalized consent for the use of Ambient Listening during the visit with  Kendra Berrios MD for chart documentation. 7/14/2025  12:58  EDT

## (undated) DEVICE — GOWN,AURORA,FABRIC-REINFORCED,X-LARGE: Brand: MEDLINE

## (undated) DEVICE — SYR LL TP 10ML STRL

## (undated) DEVICE — MARKR SKIN W/RULR AND LBL

## (undated) DEVICE — SLV SCD KN/LEN ADJ EXPRSS BLENDED MD 1P/U

## (undated) DEVICE — SUT PROLN 5/0 C1 DA 24IN 8725H

## (undated) DEVICE — TRY SKINPREP PVP SCRB W PAINT

## (undated) DEVICE — SUT SILK 3/0 TIES 18IN A184H

## (undated) DEVICE — DRP SURG UNIV BASIC BILAMINATE 70X85IN DISP STRL

## (undated) DEVICE — STERILE POLYISOPRENE POWDER-FREE SURGICAL GLOVES: Brand: PROTEXIS

## (undated) DEVICE — SOL IRR NACL 0.9PCT BT 1000ML

## (undated) DEVICE — SUT GUT CHRM 3/0 SH 27IN G122H

## (undated) DEVICE — STANDARD HYPODERMIC NEEDLE,POLYPROPYLENE HUB: Brand: MONOJECT

## (undated) DEVICE — DRSNG TELFA PAD NONADH STR 1S 3X4IN

## (undated) DEVICE — GLV SURG SENSICARE PI ORTHO SZ7.5 LF STRL

## (undated) DEVICE — ADHS SKIN PREMIERPRO EXOFIN TOPICAL HI/VISC .5ML

## (undated) DEVICE — SUT GUT CHRM 4/0 SH 27IN G121H

## (undated) DEVICE — DRN PENRS SIL 1/2X18IN LF

## (undated) DEVICE — 1010 S-DRAPE TOWEL DRAPE 10/BX: Brand: STERI-DRAPE™

## (undated) DEVICE — ATHLETIC SUPPORTER LATEX FREE, XLARGE

## (undated) DEVICE — ADHS SKIN SURG TISS VISC PREMIERPRO EXOFIN HI/VISC FAST/DRY

## (undated) DEVICE — INTENDED FOR TISSUE SEPARATION, AND OTHER PROCEDURES THAT REQUIRE A SHARP SURGICAL BLADE TO PUNCTURE OR CUT.: Brand: BARD-PARKER ® CARBON RIB-BACK BLADES

## (undated) DEVICE — MINOR-LF: Brand: MEDLINE INDUSTRIES, INC.

## (undated) DEVICE — TOWEL,OR,DSP,ST,BLUE,STD,4/PK,20PK/CS: Brand: MEDLINE

## (undated) DEVICE — PENCL E/S SMOKEEVAC W/TELESCP CANN

## (undated) DEVICE — PENROSE DRAIN 12" X 1/4: Brand: CARDINAL HEALTH

## (undated) DEVICE — GLV SURG SENSICARE SLT PF LF 7 STRL

## (undated) DEVICE — MAJOR-LF: Brand: MEDLINE INDUSTRIES, INC.

## (undated) DEVICE — GLV SURG SENSICARE PI ORTHO SZ8 LF STRL

## (undated) DEVICE — SUT MNCRYL PLS ANTIB UD 4/0 PS2 18IN

## (undated) DEVICE — BRACHIAL/AXILLARY/CEREBRAL DRAPE 38 1/2" X 60": Brand: BRACHIAL/AXILLARY/CEREBRAL DRAPE

## (undated) DEVICE — ELECTRD BLD 1P SS STD 1IN